# Patient Record
Sex: FEMALE | Race: BLACK OR AFRICAN AMERICAN | NOT HISPANIC OR LATINO | Employment: OTHER | URBAN - METROPOLITAN AREA
[De-identification: names, ages, dates, MRNs, and addresses within clinical notes are randomized per-mention and may not be internally consistent; named-entity substitution may affect disease eponyms.]

---

## 2017-02-03 ENCOUNTER — HOSPITAL ENCOUNTER (OUTPATIENT)
Dept: PHYSICAL MEDICINE AND REHAB | Facility: CLINIC | Age: 62
Discharge: HOME | End: 2017-02-03
Attending: ORTHOPAEDIC SURGERY

## 2017-02-03 DIAGNOSIS — M54.16 LUMBAR RADICULITIS: ICD-10-CM

## 2017-02-14 ENCOUNTER — OFFICE VISIT - HEALTHEAST (OUTPATIENT)
Dept: RADIOLOGY | Facility: CLINIC | Age: 62
End: 2017-02-14

## 2017-03-08 DIAGNOSIS — I10 HYPERTENSION GOAL BP (BLOOD PRESSURE) < 140/90: ICD-10-CM

## 2017-03-08 NOTE — LETTER
March 22, 2017      Aishwarya Way  [address] [place] [address]        Dear Ms. Aishwarya Way,    We are contacting you today to notify you that you are due for a diabetes and blood pressure follow up for further refills. Please call [phone] to schedule an appointment.       Sincerely,     Cassi Wooten MD

## 2017-03-09 NOTE — TELEPHONE ENCOUNTER
hydrochlorothiazide (MICROZIDE) 12.5 MG      Last Written Prescription Date: 3/9/16  Last Fill Quantity: 90, # refills: 2  Last Office Visit with G, P or [facility] prescribing provider: 4/4/16       Potassium   Date Value Ref Range Status   03/04/2016 3.7 3.4 - 5.3 mmol/L Final     Creatinine   Date Value Ref Range Status   11/08/2016 0.73 0.52 - 1.04 mg/dL Final     BP Readings from Last 3 Encounters:   11/08/16 142/78   10/30/16 158/84   05/17/16 (!) 142/94

## 2017-03-10 RX ORDER — HYDROCHLOROTHIAZIDE 12.5 MG/1
12.5 CAPSULE ORAL DAILY
Qty: 30 CAPSULE | Refills: 0 | Status: SHIPPED | OUTPATIENT
Start: 2017-03-10

## 2017-03-10 NOTE — TELEPHONE ENCOUNTER
Medication is being filled for 1 time refill only due to:  due for an appt for diabetes check, BP check.   Please contact patient to schedule this.   Siomara Smart, KIRILL  Triage Nurse

## 2017-04-04 DIAGNOSIS — E11.65 TYPE 2 DIABETES MELLITUS WITH HYPERGLYCEMIA, WITHOUT LONG-TERM CURRENT USE OF INSULIN (H): ICD-10-CM

## 2017-04-04 NOTE — LETTER
April 18, 2017      Jocy [address]  [address] [place] [address]        Dear Ms. Aishwarya Way,    We are contacting you today to notify you that you are due for an annual physical (it has been over a year since we last saw you) or a medication follow up for further refills. Please call [phone] to schedule an appointment.     Sincerely,     Cassi Wooten MD

## 2017-04-04 NOTE — TELEPHONE ENCOUNTER
metFORMIN (GLUCOPHAGE) 500 MG         Last Written Prescription Date: 11/6/16  Last Fill Quantity: 90, # refills: 0  Last Office Visit with [facility], [facility] or [facility] prescribing provider:  4/4/16        BP Readings from Last 3 Encounters:   11/08/16 142/78   10/30/16 158/84   05/17/16 (!) 142/94     Lab Results   Component Value Date    MICROL 16 08/25/2015     Lab Results   Component Value Date    UMALCR 7.79 08/25/2015     Creatinine   Date Value Ref Range Status   11/08/2016 0.73 0.52 - 1.04 mg/dL Final   ]  GFR Estimate   Date Value Ref Range Status   11/08/2016 81 >60 mL/min/1.7m2 Final     Comment:     Non  GFR Calc   03/04/2016 >90  Non  GFR Calc   >60 mL/min/1.7m2 Final   08/25/2015 >90  Non  GFR Calc   >60 mL/min/1.7m2 Final     GFR Estimate If Black   Date Value Ref Range Status   11/08/2016 >90   GFR Calc   >60 mL/min/1.7m2 Final   03/04/2016 >90   GFR Calc   >60 mL/min/1.7m2 Final   08/25/2015 >90   GFR Calc   >60 mL/min/1.7m2 Final     Lab Results   Component Value Date    CHOL 179 03/04/2016     Lab Results   Component Value Date    HDL 71 03/04/2016     Lab Results   Component Value Date    LDL 90 03/04/2016     Lab Results   Component Value Date    TRIG 88 03/04/2016     Lab Results   Component Value Date    CHOLHDLRATIO 2.5 08/25/2015     Lab Results   Component Value Date    AST 45 03/04/2016     Lab Results   Component Value Date    ALT 93 03/04/2016     Lab Results   Component Value Date    A1C 6.5 03/04/2016    A1C 7.3 08/25/2015    A1C 8.0 04/14/2015    A1C 7.6 07/31/2014    A1C 7.9 03/13/2014     Potassium   Date Value Ref Range Status   03/04/2016 3.7 3.4 - 5.3 mmol/L Final

## 2017-04-05 NOTE — TELEPHONE ENCOUNTER
Medication is being filled for 1 time refill only due to:  due for annual labs and office visit.    Difficulty contacting patient-please try again to help her schedule.  Siomara Smart, RN  Triage Nurse

## 2017-04-12 DIAGNOSIS — I10 HYPERTENSION GOAL BP (BLOOD PRESSURE) < 140/90: ICD-10-CM

## 2017-04-13 RX ORDER — HYDROCHLOROTHIAZIDE 12.5 MG/1
CAPSULE ORAL
Qty: 30 CAPSULE | Refills: 0
Start: 2017-04-13

## 2017-04-13 NOTE — TELEPHONE ENCOUNTER
Siobhan refill given 3/10/2017. No appt made after attempts to reach patient. Refusal message sent to pharmacy.    Federica Vale RN

## 2017-04-13 NOTE — TELEPHONE ENCOUNTER
hydrochlorothiazide (MICROZIDE) 12.5 MG      Last Written Prescription Date: 3/10/17  Last Fill Quantity: 30, # refills: 0  Last Office Visit with G, P or [facility] prescribing provider: 4/4/16       Potassium   Date Value Ref Range Status   03/04/2016 3.7 3.4 - 5.3 mmol/L Final     Creatinine   Date Value Ref Range Status   11/08/2016 0.73 0.52 - 1.04 mg/dL Final     BP Readings from Last 3 Encounters:   11/08/16 142/78   10/30/16 158/84   05/17/16 (!) 142/94

## 2017-04-26 DIAGNOSIS — I10 HYPERTENSION GOAL BP (BLOOD PRESSURE) < 140/90: ICD-10-CM

## 2017-04-26 RX ORDER — HYDROCHLOROTHIAZIDE 12.5 MG/1
CAPSULE ORAL
Qty: 30 CAPSULE | Refills: 0 | OUTPATIENT
Start: 2017-04-26

## 2017-04-26 NOTE — TELEPHONE ENCOUNTER
Per last refill request, due for an appointment. Last refill was denied 4/12.  Unable to contact patient.  Siomara Smart, KIRILL  Triage Nurse

## 2017-05-07 DIAGNOSIS — E11.65 TYPE 2 DIABETES MELLITUS WITH HYPERGLYCEMIA, WITHOUT LONG-TERM CURRENT USE OF INSULIN (H): ICD-10-CM

## 2017-05-08 NOTE — TELEPHONE ENCOUNTER
metFORMIN (GLUCOPHAGE) 500 MG         Last Written Prescription Date: 4/5/17  Last Fill Quantity: 90, # refills: 0  Last Office Visit with [facility], [facility] or [facility] prescribing provider:  4/4/16        BP Readings from Last 3 Encounters:   11/08/16 142/78   10/30/16 158/84   05/17/16 (!) 142/94     Lab Results   Component Value Date    MICROL 16 08/25/2015     Lab Results   Component Value Date    UMALCR 7.79 08/25/2015     Creatinine   Date Value Ref Range Status   11/08/2016 0.73 0.52 - 1.04 mg/dL Final   ]  GFR Estimate   Date Value Ref Range Status   11/08/2016 81 >60 mL/min/1.7m2 Final     Comment:     Non  GFR Calc   03/04/2016 >90  Non  GFR Calc   >60 mL/min/1.7m2 Final   08/25/2015 >90  Non  GFR Calc   >60 mL/min/1.7m2 Final     GFR Estimate If Black   Date Value Ref Range Status   11/08/2016 >90   GFR Calc   >60 mL/min/1.7m2 Final   03/04/2016 >90   GFR Calc   >60 mL/min/1.7m2 Final   08/25/2015 >90   GFR Calc   >60 mL/min/1.7m2 Final     Lab Results   Component Value Date    CHOL 179 03/04/2016     Lab Results   Component Value Date    HDL 71 03/04/2016     Lab Results   Component Value Date    LDL 90 03/04/2016     Lab Results   Component Value Date    TRIG 88 03/04/2016     Lab Results   Component Value Date    CHOLHDLRATIO 2.5 08/25/2015     Lab Results   Component Value Date    AST 45 03/04/2016     Lab Results   Component Value Date    ALT 93 03/04/2016     Lab Results   Component Value Date    A1C 6.5 03/04/2016    A1C 7.3 08/25/2015    A1C 8.0 04/14/2015    A1C 7.6 07/31/2014    A1C 7.9 03/13/2014     Potassium   Date Value Ref Range Status   03/04/2016 3.7 3.4 - 5.3 mmol/L Final

## 2017-05-10 DIAGNOSIS — I10 HYPERTENSION GOAL BP (BLOOD PRESSURE) < 140/90: ICD-10-CM

## 2017-05-11 RX ORDER — HYDROCHLOROTHIAZIDE 12.5 MG/1
CAPSULE ORAL
Qty: 30 CAPSULE | Refills: 0
Start: 2017-05-11

## 2017-05-11 NOTE — TELEPHONE ENCOUNTER
hydrochlorothiazide (MICROZIDE) 12.5 MG      Last Written Prescription Date: 3/10/17  Last Fill Quantity: 30, # refills: 0  Last Office Visit with G, P or [facility] prescribing provider: 4/4/16       Potassium   Date Value Ref Range Status   03/04/2016 3.7 3.4 - 5.3 mmol/L Final     Creatinine   Date Value Ref Range Status   11/08/2016 0.73 0.52 - 1.04 mg/dL Final     BP Readings from Last 3 Encounters:   11/08/16 142/78   10/30/16 158/84   05/17/16 (!) 142/94

## 2017-06-10 DIAGNOSIS — E11.65 TYPE 2 DIABETES MELLITUS WITH HYPERGLYCEMIA, WITHOUT LONG-TERM CURRENT USE OF INSULIN (H): ICD-10-CM

## 2017-06-12 NOTE — TELEPHONE ENCOUNTER
metFORMIN (GLUCOPHAGE) 500 MG         Last Written Prescription Date: 4/5/17  Last Fill Quantity: 90, # refills: 0  Last Office Visit with [facility], [facility] or [facility] prescribing provider:  4/4/16        BP Readings from Last 3 Encounters:   11/08/16 142/78   10/30/16 158/84   05/17/16 (!) 142/94     Lab Results   Component Value Date    MICROL 16 08/25/2015     Lab Results   Component Value Date    UMALCR 7.79 08/25/2015     Creatinine   Date Value Ref Range Status   11/08/2016 0.73 0.52 - 1.04 mg/dL Final   ]  GFR Estimate   Date Value Ref Range Status   11/08/2016 81 >60 mL/min/1.7m2 Final     Comment:     Non  GFR Calc   03/04/2016 >90  Non  GFR Calc   >60 mL/min/1.7m2 Final   08/25/2015 >90  Non  GFR Calc   >60 mL/min/1.7m2 Final     GFR Estimate If Black   Date Value Ref Range Status   11/08/2016 >90   GFR Calc   >60 mL/min/1.7m2 Final   03/04/2016 >90   GFR Calc   >60 mL/min/1.7m2 Final   08/25/2015 >90   GFR Calc   >60 mL/min/1.7m2 Final     Lab Results   Component Value Date    CHOL 179 03/04/2016     Lab Results   Component Value Date    HDL 71 03/04/2016     Lab Results   Component Value Date    LDL 90 03/04/2016     Lab Results   Component Value Date    TRIG 88 03/04/2016     Lab Results   Component Value Date    CHOLHDLRATIO 2.5 08/25/2015     Lab Results   Component Value Date    AST 45 03/04/2016     Lab Results   Component Value Date    ALT 93 03/04/2016     Lab Results   Component Value Date    A1C 6.5 03/04/2016    A1C 7.3 08/25/2015    A1C 8.0 04/14/2015    A1C 7.6 07/31/2014    A1C 7.9 03/13/2014     Potassium   Date Value Ref Range Status   03/04/2016 3.7 3.4 - 5.3 mmol/L Final

## 2017-06-14 NOTE — TELEPHONE ENCOUNTER
Several attempts to contact patient to help her schedule an appointment.   Refusal sent to pharmacy.

## 2017-08-12 ENCOUNTER — HOSPITAL ENCOUNTER (EMERGENCY)
Facility: CLINIC | Age: 62
Discharge: HOME | End: 2017-08-12
Attending: EMERGENCY MEDICINE | Admitting: EMERGENCY MEDICINE
Payer: COMMERCIAL

## 2017-08-12 ENCOUNTER — APPOINTMENT (OUTPATIENT)
Dept: GENERAL RADIOLOGY | Facility: CLINIC | Age: 62
End: 2017-08-12
Attending: EMERGENCY MEDICINE
Payer: COMMERCIAL

## 2017-08-12 VITALS
DIASTOLIC BLOOD PRESSURE: 83 MMHG | HEART RATE: 108 BPM | RESPIRATION RATE: 18 BRPM | OXYGEN SATURATION: 98 % | HEIGHT: 66 IN | TEMPERATURE: 97.7 F | BODY MASS INDEX: 30.05 KG/M2 | WEIGHT: 187 LBS | SYSTOLIC BLOOD PRESSURE: 139 MMHG

## 2017-08-12 DIAGNOSIS — M79.641 PAIN OF RIGHT HAND: ICD-10-CM

## 2017-08-12 DIAGNOSIS — L03.113 CELLULITIS OF MULTIPLE SITES OF RIGHT HAND AND FINGERS: ICD-10-CM

## 2017-08-12 DIAGNOSIS — L03.011 CELLULITIS OF MULTIPLE SITES OF RIGHT HAND AND FINGERS: ICD-10-CM

## 2017-08-12 DIAGNOSIS — E11.65 TYPE 2 DIABETES MELLITUS WITH HYPERGLYCEMIA, WITHOUT LONG-TERM CURRENT USE OF INSULIN (H): ICD-10-CM

## 2017-08-12 DIAGNOSIS — M79.89 SWELLING OF RIGHT HAND: ICD-10-CM

## 2017-08-12 LAB
ANION GAP SERPL CALCULATED.3IONS-SCNC: 7 MMOL/L (ref 3–14)
BASOPHILS # BLD AUTO: 0 10E9/L (ref 0–0.2)
BASOPHILS NFR BLD AUTO: 0.3 %
BUN SERPL-MCNC: 14 MG/DL (ref 7–30)
CALCIUM SERPL-MCNC: 9.5 MG/DL (ref 8.5–10.1)
CHLORIDE SERPL-SCNC: 101 MMOL/L (ref 94–109)
CO2 SERPL-SCNC: 30 MMOL/L (ref 20–32)
CREAT SERPL-MCNC: 0.71 MG/DL (ref 0.52–1.04)
CRP SERPL-MCNC: 14.2 MG/L (ref 0–8)
DIFFERENTIAL METHOD BLD: ABNORMAL
EOSINOPHIL # BLD AUTO: 0 10E9/L (ref 0–0.7)
EOSINOPHIL NFR BLD AUTO: 0.2 %
ERYTHROCYTE [DISTWIDTH] IN BLOOD BY AUTOMATED COUNT: 13.8 % (ref 10–15)
GFR SERPL CREATININE-BSD FRML MDRD: 83 ML/MIN/1.7M2
GLUCOSE SERPL-MCNC: 187 MG/DL (ref 70–99)
HCT VFR BLD AUTO: 36 % (ref 35–47)
HGB BLD-MCNC: 11.6 G/DL (ref 11.7–15.7)
IMM GRANULOCYTES # BLD: 0 10E9/L (ref 0–0.4)
IMM GRANULOCYTES NFR BLD: 0.4 %
LYMPHOCYTES # BLD AUTO: 2.2 10E9/L (ref 0.8–5.3)
LYMPHOCYTES NFR BLD AUTO: 24.2 %
MCH RBC QN AUTO: 29.5 PG (ref 26.5–33)
MCHC RBC AUTO-ENTMCNC: 32.2 G/DL (ref 31.5–36.5)
MCV RBC AUTO: 92 FL (ref 78–100)
MONOCYTES # BLD AUTO: 0.6 10E9/L (ref 0–1.3)
MONOCYTES NFR BLD AUTO: 6.7 %
NEUTROPHILS # BLD AUTO: 6.1 10E9/L (ref 1.6–8.3)
NEUTROPHILS NFR BLD AUTO: 68.2 %
NRBC # BLD AUTO: 0 10*3/UL
NRBC BLD AUTO-RTO: 0 /100
PLATELET # BLD AUTO: 337 10E9/L (ref 150–450)
POTASSIUM SERPL-SCNC: 3.6 MMOL/L (ref 3.4–5.3)
RBC # BLD AUTO: 3.93 10E12/L (ref 3.8–5.2)
SODIUM SERPL-SCNC: 138 MMOL/L (ref 133–144)
WBC # BLD AUTO: 9 10E9/L (ref 4–11)

## 2017-08-12 PROCEDURE — 29125 APPL SHORT ARM SPLINT STATIC: CPT | Mod: RT

## 2017-08-12 PROCEDURE — 85025 COMPLETE CBC W/AUTO DIFF WBC: CPT | Performed by: EMERGENCY MEDICINE

## 2017-08-12 PROCEDURE — 73130 X-RAY EXAM OF HAND: CPT | Mod: RT

## 2017-08-12 PROCEDURE — 99284 EMERGENCY DEPT VISIT MOD MDM: CPT | Mod: 25

## 2017-08-12 PROCEDURE — 76882 US LMTD JT/FCL EVL NVASC XTR: CPT

## 2017-08-12 PROCEDURE — 80048 BASIC METABOLIC PNL TOTAL CA: CPT | Performed by: EMERGENCY MEDICINE

## 2017-08-12 PROCEDURE — 86140 C-REACTIVE PROTEIN: CPT | Performed by: EMERGENCY MEDICINE

## 2017-08-12 PROCEDURE — 25000132 ZZH RX MED GY IP 250 OP 250 PS 637: Performed by: EMERGENCY MEDICINE

## 2017-08-12 RX ORDER — CEPHALEXIN 500 MG/1
500 CAPSULE ORAL 3 TIMES DAILY
Qty: 21 CAPSULE | Refills: 0 | Status: SHIPPED | OUTPATIENT
Start: 2017-08-12 | End: 2017-08-19

## 2017-08-12 RX ORDER — INDOMETHACIN 50 MG/1
50 CAPSULE ORAL
Qty: 9 CAPSULE | Refills: 0 | Status: ON HOLD | OUTPATIENT
Start: 2017-08-12 | End: 2024-05-13

## 2017-08-12 RX ORDER — OXYCODONE AND ACETAMINOPHEN 5; 325 MG/1; MG/1
1-2 TABLET ORAL EVERY 4 HOURS PRN
Qty: 15 TABLET | Refills: 0 | Status: ON HOLD | OUTPATIENT
Start: 2017-08-12 | End: 2024-05-13

## 2017-08-12 RX ORDER — OXYCODONE HYDROCHLORIDE 5 MG/1
10 TABLET ORAL ONCE
Status: COMPLETED | OUTPATIENT
Start: 2017-08-12 | End: 2017-08-12

## 2017-08-12 RX ORDER — KETOROLAC TROMETHAMINE 10 MG/1
10 TABLET, FILM COATED ORAL EVERY 6 HOURS PRN
COMMUNITY
End: 2017-08-12

## 2017-08-12 RX ORDER — LIDOCAINE 40 MG/G
CREAM TOPICAL
Status: DISCONTINUED | OUTPATIENT
Start: 2017-08-12 | End: 2017-08-12 | Stop reason: HOSPADM

## 2017-08-12 RX ADMIN — OXYCODONE HYDROCHLORIDE 10 MG: 5 TABLET ORAL at 07:07

## 2017-08-12 ASSESSMENT — ENCOUNTER SYMPTOMS
CHILLS: 0
NAUSEA: 0
FEVER: 0
COLOR CHANGE: 1
VOMITING: 0

## 2017-08-12 NOTE — ED NOTES
Pt to ER with c/o pain and redness to right hand, noted on thurs and saw Dr. ramirez gave her toradol, now increased redness and warm to the touch

## 2017-08-12 NOTE — ED AVS SNAPSHOT
[facility] Emergency Department    [address]    [facility] [address]    Phone:  [phone]    Fax:  [phone]                                       Aishwarya Way   MRN: [id number]    Department:  [facility] Emergency Department   Date of Visit:  8/12/2017           After Visit Summary Signature Page     I have received my discharge instructions, and my questions have been answered. I have discussed any challenges I see with this plan with the nurse or doctor.    ..........................................................................................................................................  Patient/Patient Representative Signature      ..........................................................................................................................................  Patient Representative Print Name and Relationship to Patient    ..................................................               ................................................  Date                                            Time    ..........................................................................................................................................  Reviewed by Signature/Title    ...................................................              ..............................................  Date                                                            Time

## 2017-08-12 NOTE — ED PROVIDER NOTES
History     Chief Complaint:  Hand pain    HPI   Aishwarya Way is a 62 year old female with a history of controlled diabetes,  who presents with hand pain. The patient states that two days ago she went to sleep at 1900 and woke up two hours later with some right hand pain. The pain originated in her ring finger but has spread into her entire right hand now. There was initially no swelling in the hand, however in the past 24 hours the pain has increased and redness, warmness to touch, and swelling have begun. Yesterday the patient went to [facility] for this and was prescribed Toradol and over the counter analgesics for treatment. The patient denies having any history of gout, recent travel, or recurrent infection. She denies wound or injury of the skin overlying the area.  Of note she does state that when she was a child she would have random occasional painless right  hand swelling but that this has not happened to her as an adult. Her pain does not radiate anywhere outside of her hand and she denies experiencing any fever, chills, or nausea.     Allergies:  No Known Drug Allergies      Medications:    Toradol  Metformin  Microzide  Gabapentin  lariam  Crestor  Zyrtec    Past Medical History:    Obesity  Osteoarthritis  Diabetes mellitus  Hyperlipidemia  Hypertension    Past Surgical History:    Colonoscopy  D&C    Family History:    The patient denies any relevant family medical history.     Social History:  Smoking Status: No  Smokeless Tobacco: No  Alcohol Use: No   Marital Status:   [2]    Review of Systems   Constitutional: Negative for chills and fever.   Gastrointestinal: Negative for nausea and vomiting.   Musculoskeletal:        Right hand swelling and pain   Skin: Positive for color change.   All other systems reviewed and are negative.    Physical Exam   Vitals:  Patient Vitals for the past 24 hrs:   BP Temp Temp src Pulse Resp SpO2 Height Weight   08/12/17 0634 (!) 159/91  "97.7  F (36.5  C) Temporal 108 20 98 % 1.676 m (5' 6\") 84.8 kg (187 lb)      Physical Exam   General: Adult female sitting upright in bed  Eyes: PERRL, Conjunctive within normal limits  ENT: Moist mucous membranes, oropharynx clear.   CV: Normal S1S2, no murmur, rub or gallop. Regular rate and rhythm. 2+ radial pulse  Resp: Clear to auscultation bilaterally, no wheezes, rales or rhonchi. Normal respiratory effort.  GI: Abdomen is soft, nontender and nondistended. No palpable masses. No rebound or guarding.  MSK: Normal active range of motion. Tenderness and swelling over the dorsum of the right hand into the ring finger. She has mobility of the ring finger but with pain. Some tenderness to palpation over the area of swelling  Skin: Warm and dry. No rashes or lesions or ecchymoses on visible skin. Spreading erythema over the edema as noted on the mas surface of the proximal right ringer finger and palm.  Neuro: Alert and oriented. Responds appropriately to all questions and commands. No focal findings appreciated. Normal muscle tone. Sensation in tact to light touch over the right hand  Psych: Normal mood and affect. Pleasant.   Emergency Department Course     Imaging:  Radiology findings were communicated with the patient who voiced understanding of the findings.  Hand XR G/E 3 views, right:  IMPRESSION: No acute abnormality. Degenerative changes including some  soft tissue ossification at the base of the fourth proximal phalanx.  Reading per radiology.     Laboratory:  Laboratory findings were communicated with the patient who voiced understanding of the findings.  CBC: 11.6(L), o/w WNL (WBC 9.0, )   CRP inflammation: 14.2(H)  BMP: Glucose: 187(H), o/w WNL (Creatinine 0.71)     Procedures:  Procedure: Limited Bedside US  Name of the study: POC US soft tissue  Performed by: Dr Nic CEE  Indications: Right hand swelling and pain  Body Location: Right hand  Findings: Cobblestoning of tissue overlying area " of soft tissue swelling. Normal visualized bone without visible fluid collection at or near the joint of the 4th MCP. No soft tissue fluid collection.  Interpretation: Findings suspicious for cellulitis.   Archiving of images: Hard copy images were printed for scanning into the patient medical record, and images were also saved digitally to the internal hard drive of the ultrasound machine.        Splint Placement    PLACEMENT: Custom Orthoglass ulnar gutter splint with appropriate padding was applied to the right upper extremity and after placement I checked and adjusted the fit to ensure proper positioning. The patient was more comfortable with the splint in place. Sensation and circulation are intact after splint placement.      Interventions:  0707 Oxycodone 10 mg oral    Emergency Department Course:  Nursing notes and vitals reviewed.  I performed an exam of the patient as documented above.   IV was inserted and blood was drawn for laboratory testing, results above.   The patient was sent for a XR while in the emergency department, results above.      0808 I reassessed the patient. I discussed xray findings. Patient denies any new concerns.     0833 I rechecked with the patient and splinted the patient as per procedure note.    0841 I left a message with [facility] hand referral line regarding the patient's follow up    I discussed the treatment plan with the patient. They expressed understanding of this plan and consented to discharge. They will be discharged home with instructions for care and follow up. In addition, the patient will return to the emergency department if their symptoms persist, worsen, if new symptoms arise or if there is any concern.  All questions were answered.     I personally reviewed the laboratory results with the Patient and answered all related questions prior to discharge.    Impression & Plan      Medical Decision Making:  Aishwarya Way is a 62 year old female  type 2 diabetic with reportedly good blood sugar control  who presents to the emergency department today with right hand swelling and pain for now three days, worsening despite NSAIDs. X ray did not show evidence of fracture and she has no trauma, but there is a soft tissue ossification. I considered was septic arthritis or cellulitis. She has some cobblestoning of the tissue overlying the area on bedside ultrasound with no evidence of fluid collection therefor abscess seems unlikely as does a septic joint. Cannot exclude gouty arthritis and it is still on the differential. Due to concern for cellulitis I will cover with Keflex. The patient is splinted for comfort and to avoid spread of infection if this is the case. There is no evidence to suggest flexor tenosynovitis at this time. I did refer her and call the [facility] orthopedics hand specialist referral line. Patient should follow up in 2-3 days for reassessment. Appropriate pain control including ongoing NSAIDs recommended. Return to the emergency room with fever, systemic symptoms, or uncontrolled pain. All questions were answered prior to discharge.    Diagnosis:    ICD-10-CM    1. Cellulitis of multiple sites of right hand and fingers L03.011     L03.113    2. Type 2 diabetes mellitus with hyperglycemia, without long-term current use of insulin (H) E11.65    3. Pain of right hand M79.641    4. Swelling of right hand M79.89         Disposition:   Discharged    Discharge Medications:  New Prescriptions    CEPHALEXIN (KEFLEX) 500 MG CAPSULE    Take 1 capsule (500 mg) by mouth 3 times daily for 7 days    INDOMETHACIN (INDOCIN) 50 MG CAPSULE    Take 1 capsule (50 mg) by mouth 3 times daily (with meals) for 3 days    OXYCODONE-ACETAMINOPHEN (PERCOCET) 5-325 MG PER TABLET    Take 1-2 tablets by mouth every 4 hours as needed for pain       Scribe Disclosure:  Layo LEUNG, am serving as a scribe at 6:51 AM on 8/12/2017 to document services personally  performed by Ashley Lucero MD, based on my observations and the provider's statements to me.   [facility] EMERGENCY DEPARTMENT       Ashley Lucero MD  08/12/17 6717

## 2017-08-12 NOTE — ED AVS SNAPSHOT
[facility] Emergency Department    [address]    [facility] [address]    Phone:  [phone]    Fax:  [phone]                                       Aishwarya Way   MRN: [id number]    Department:  [facility] Emergency Department   Date of Visit:  8/12/2017           Patient Information     Date Of Birth          1955        Your diagnoses for this visit were:     Cellulitis of multiple sites of right hand and fingers     Type 2 diabetes mellitus with hyperglycemia, without long-term current use of insulin (H)     Pain of right hand     Swelling of right hand        You were seen by Ashley Lucero MD.      Follow-up Information     Follow up with [facility].    Why:  2-3 days for reassessment        Follow up with [facility] Emergency Department.    Specialty:  EMERGENCY MEDICINE    Why:  As needed, If symptoms worsen    Contact information:    [address] [facility] [id number] [phone]        Discharge Instructions       Discharge Instructions  Cellulitis    Cellulitis is an infection of the skin that occurs when bacteria enter the skin.   Symptoms are generally redness, swelling, warmth and pain.  Your infection appeared to be appropriate to treat at home with antibiotics.  However, sometimes your infection may be worse than it seemed at first, or may worsen with time. If you have new or worse symptoms, you may need to be seen again in the Emergency Department or by your primary provider.    Generally, every Emergency Department visit should have a follow-up clinic visit with either a primary or a specialty clinic/provider. Please follow-up as instructed by your emergency provider today.    Return to the Emergency Department if:    The redness, pain, or swelling gets a lot worse.  If the red area was marked, return if it is red significantly beyond the marked area.    You are unable  to get your antibiotics, or are vomiting (throwing up) these pills, or you cannot take them.    You are feeling more ill, weak or lightheaded.    You start to run a new fever (temperature >101 F).    Anything else about the infection worries or concerns you.  Treatment:    Start your antibiotics right away, and take them as prescribed. Be sure to finish the whole prescription, even if you are better.    Apply a heating pad, warm packs, or warm water soaks to the infected area for 15 minutes at a time, at least 3 times a day. Do not use a heating pad on your feet or legs if you have diabetes. Do not sleep with a heating pad on, since this can cause burns or skin injury.    Rest your injured area for at least 1-2 days. After that you may start using your extremity again as long as there is not too much pain.     Raise the injured area above the level of your heart as much as possible in the first 1-2 days.    Tylenol  (acetaminophen), Motrin  (ibuprofen), or Advil  (ibuprofen) may help may help reduce pain and fever and may help you feel more comfortable. Be sure to read and follow the package directions, and ask your provider if you have questions.    If you were given a prescription for medicine here today, be sure to read all of the information (including the package insert) that comes with your prescription.  This will include important information about the medicine, its side effects, and any warnings that you need to know about.  The pharmacist who fills the prescription can provide more information and answer questions you may have about the medicine.  If you have questions or concerns that the pharmacist cannot address, please call or return to the Emergency Department.     Remember that you can always come back to the Emergency Department if you are not able to see your regular provider in the amount of time listed above, if you get any new symptoms, or if there is anything that worries you.    Discharge  References/Attachments     GOUT (ENGLISH)      24 Hour Appointment Hotline       To make an appointment at any [facility], call [phone] ([phone]). If you don't have a family doctor or clinic, we will help you find one. [facility] clinics are conveniently located to serve the needs of you and your family.             Review of your medicines      START taking        Dose / Directions Last dose taken    cephALEXin 500 MG capsule   Commonly known as:  KEFLEX   Dose:  500 mg   Quantity:  21 capsule        Take 1 capsule (500 mg) by mouth 3 times daily for 7 days   Refills:  0        indomethacin 50 MG capsule   Commonly known as:  INDOCIN   Dose:  50 mg   Quantity:  9 capsule        Take 1 capsule (50 mg) by mouth 3 times daily (with meals) for 3 days   Refills:  0        oxyCODONE-acetaminophen 5-325 MG per tablet   Commonly known as:  PERCOCET   Dose:  1-2 tablet   Quantity:  15 tablet        Take 1-2 tablets by mouth every 4 hours as needed for pain   Refills:  0          Our records show that you are taking the medicines listed below. If these are incorrect, please call your family doctor or clinic.        Dose / Directions Last dose taken    aspirin 325 MG tablet   Dose:  1 tablet        Take 1 tablet by mouth every other day   Refills:  0        blood glucose monitoring lancets   Quantity:  3 Box        Use to test blood sugar 1 times daily or as directed.   Refills:  0        blood glucose monitoring meter device kit   Commonly known as:  no brand specified   Dose:  1 kit   Quantity:  1 kit        1 kit by In Vitro route daily   Refills:  0        blood glucose monitoring test strip   Commonly known as:  no brand specified   Quantity:  100 each        Use to test blood sugar 1 times daily or as directed.   Refills:  0        gabapentin 300 MG capsule   Commonly known as:  NEURONTIN   Dose:  300 mg   Quantity:  14 capsule        Take 1 capsule (300 mg) by mouth 2 times daily for 7 days   Refills:   0        hydrochlorothiazide 12.5 MG capsule   Commonly known as:  MICROZIDE   Dose:  12.5 mg   Quantity:  30 capsule        Take 1 capsule (12.5 mg) by mouth daily Last refill the pt needs an appt with her provider   Refills:  0        HYDROcodone-acetaminophen 5-325 MG per tablet   Commonly known as:  NORCO   Dose:  1-2 tablet   Quantity:  15 tablet        Take 1-2 tablets by mouth every 6 hours as needed for moderate to severe pain   Refills:  0        mefloquine 250 MG tablet   Commonly known as:  LARIAM   Dose:  250 mg   Quantity:  9 tablet        Take 1 tablet (250 mg) by mouth every 7 days Start 2 weeks prior to trip  And continue for 4 weeks after returning home.   Refills:  0        metFORMIN 500 MG tablet   Commonly known as:  GLUCOPHAGE   Quantity:  90 tablet        TAKE 2 TABLETS BY MOUTH EVERY MORNING AND TAKE 1 TABLET BY MOUTH EVERY EVENING   Refills:  0        MULTIVITAMIN/IRON PO        Take  by mouth.   Refills:  0        OVER-THE-COUNTER        Allergy eye drops   Refills:  0        rosuvastatin 20 MG tablet   Commonly known as:  CRESTOR   Dose:  20 mg   Quantity:  90 tablet        Take 1 tablet (20 mg) by mouth daily   Refills:  2        ZYRTEC 5 MG/5ML syrup   Dose:  5 mg   Generic drug:  cetirizine        Take 5 mg by mouth daily.   Refills:  0          STOP taking        Dose Reason for stopping Comments    ketorolac 10 MG tablet   Commonly known as:  TORADOL                      Prescriptions were sent or printed at these locations (3 Prescriptions)                   Other Prescriptions                Printed at Department/Unit printer (3 of 3)         cephALEXin (KEFLEX) 500 MG capsule               indomethacin (INDOCIN) 50 MG capsule               oxyCODONE-acetaminophen (PERCOCET) 5-325 MG per tablet                Procedures and tests performed during your visit     Basic metabolic panel    CBC with platelets differential    CRP inflammation    Hand XR, G/E 3 views, right    POC US SOFT  TISSUE    Peripheral IV catheter      Orders Needing Specimen Collection     None      Pending Results     Date and Time Order Name Status Description    8/12/2017 0817 POC US SOFT TISSUE In process             Pending Culture Results     No orders found from 8/10/2017 to 8/13/2017.            Pending Results Instructions     If you had any lab results that were not finalized at the time of your Discharge, you can call the ED Lab Result RN at [phone]. You will be contacted by this team for any positive Lab results or changes in treatment. The nurses are available 7 days a week from 10A to 6:30P.  You can leave a message 24 hours per day and they will return your call.        Test Results From Your Hospital Stay        8/12/2017  7:24 AM      Component Results     Component Value Ref Range & Units Status    WBC 9.0 4.0 - 11.0 10e9/L Final    RBC Count 3.93 3.8 - 5.2 10e12/L Final    Hemoglobin 11.6 (L) 11.7 - 15.7 g/dL Final    Hematocrit 36.0 35.0 - 47.0 % Final    MCV 92 78 - 100 fl Final    MCH 29.5 26.5 - 33.0 pg Final    MCHC 32.2 31.5 - 36.5 g/dL Final    RDW 13.8 10.0 - 15.0 % Final    Platelet Count 337 150 - 450 10e9/L Final    Diff Method Automated Method  Final    % Neutrophils 68.2 % Final    % Lymphocytes 24.2 % Final    % Monocytes 6.7 % Final    % Eosinophils 0.2 % Final    % Basophils 0.3 % Final    % Immature Granulocytes 0.4 % Final    Nucleated RBCs 0 0 /100 Final    Absolute Neutrophil 6.1 1.6 - 8.3 10e9/L Final    Absolute Lymphocytes 2.2 0.8 - 5.3 10e9/L Final    Absolute Monocytes 0.6 0.0 - 1.3 10e9/L Final    Absolute Eosinophils 0.0 0.0 - 0.7 10e9/L Final    Absolute Basophils 0.0 0.0 - 0.2 10e9/L Final    Abs Immature Granulocytes 0.0 0 - 0.4 10e9/L Final    Absolute Nucleated RBC 0.0  Final         8/12/2017  7:37 AM      Component Results     Component Value Ref Range & Units Status    CRP Inflammation 14.2 (H) 0.0 - 8.0 mg/L Final         8/12/2017  7:37 AM      Component Results      Component Value Ref Range & Units Status    Sodium 138 133 - 144 mmol/L Final    Potassium 3.6 3.4 - 5.3 mmol/L Final    Chloride 101 94 - 109 mmol/L Final    Carbon Dioxide 30 20 - 32 mmol/L Final    Anion Gap 7 3 - 14 mmol/L Final    Glucose 187 (H) 70 - 99 mg/dL Final    Urea Nitrogen 14 7 - 30 mg/dL Final    Creatinine 0.71 0.52 - 1.04 mg/dL Final    GFR Estimate 83 >60 mL/min/1.7m2 Final    Non  GFR Calc    GFR Estimate If Black >90   GFR Calc   >60 mL/min/1.7m2 Final    Calcium 9.5 8.5 - 10.1 mg/dL Final         8/12/2017  8:03 AM      Narrative     RIGHT HAND THREE OR MORE VIEWS   8/12/2017 7:49 AM     HISTORY: Pain and swelling.    COMPARISON: None.    FINDINGS: Mild degenerative changes at the right fourth  metacarpophalangeal articulation. There is soft tissue ossification  adjacent to the radial aspect of the base of the fourth proximal  phalanx without adjacent bony destruction. This is likely degenerative  phenomenon. No fracture or acute-appearing abnormality.        Impression     IMPRESSION: No acute abnormality. Degenerative changes including some  soft tissue ossification at the base of the fourth proximal phalanx.    REY GUTIERREZ MD         8/12/2017  8:17 AM      Result not yet available     Exam Begun                Clinical Quality Measure: Blood Pressure Screening     Your blood pressure was checked while you were in the emergency department today. The last reading we obtained was  BP: (!) 159/91 . Please read the guidelines below about what these numbers mean and what you should do about them.  If your systolic blood pressure (the top number) is less than 120 and your diastolic blood pressure (the bottom number) is less than 80, then your blood pressure is normal. There is nothing more that you need to do about it.  If your systolic blood pressure (the top number) is 120-139 or your diastolic blood pressure (the bottom number) is 80-89, your blood pressure  may be higher than it should be. You should have your blood pressure rechecked within a year by a primary care provider.  If your systolic blood pressure (the top number) is 140 or greater or your diastolic blood pressure (the bottom number) is 90 or greater, you may have high blood pressure. High blood pressure is treatable, but if left untreated over time it can put you at risk for heart attack, stroke, or kidney failure. You should have your blood pressure rechecked by a primary care provider within the next 4 weeks.  If your provider in the emergency department today gave you specific instructions to follow-up with your doctor or provider even sooner than that, you should follow that instruction and not wait for up to 4 weeks for your follow-up visit.        Thank you for choosing [facility]       Thank you for choosing [facility] for your care. Our goal is always to provide you with excellent care. Hearing back from our patients is one way we can continue to improve our services. Please take a few minutes to complete the written survey that you may receive in the mail after you visit with us. Thank you!        [employer] Information     [facility] gives you secure access to your electronic health record. If you see a primary care provider, you can also send messages to your care team and make appointments. If you have questions, please call your primary care clinic.  If you do not have a primary care provider, please call [phone] and they will assist you.        Care EveryWhere ID     This is your Care EveryWhere ID. This could be used by other organizations to access your [facility] medical records  [id number]        Equal Access to Services     DINO VAZQUEZ : Hadii rosita Grider, modesta [place], faina wrightalmadalyn guzman. So [facility] [phone].    ATENCIÓN: Si habla español, tiene a [place] disposición servicios gratuitos de asistencia lingüística. Llame al  [phone].    We comply with applicable federal civil rights laws and [place] laws. We do not discriminate on the basis of race, color, national origin, age, disability sex, sexual orientation or gender identity.            After Visit Summary       This is your record. Keep this with you and show to your community pharmacist(s) and doctor(s) at your next visit.

## 2017-08-12 NOTE — DISCHARGE INSTRUCTIONS
Discharge Instructions  Cellulitis    Cellulitis is an infection of the skin that occurs when bacteria enter the skin.   Symptoms are generally redness, swelling, warmth and pain.  Your infection appeared to be appropriate to treat at home with antibiotics.  However, sometimes your infection may be worse than it seemed at first, or may worsen with time. If you have new or worse symptoms, you may need to be seen again in the Emergency Department or by your primary provider.    Generally, every Emergency Department visit should have a follow-up clinic visit with either a primary or a specialty clinic/provider. Please follow-up as instructed by your emergency provider today.    Return to the Emergency Department if:    The redness, pain, or swelling gets a lot worse.  If the red area was marked, return if it is red significantly beyond the marked area.    You are unable to get your antibiotics, or are vomiting (throwing up) these pills, or you cannot take them.    You are feeling more ill, weak or lightheaded.    You start to run a new fever (temperature >101 F).    Anything else about the infection worries or concerns you.  Treatment:    Start your antibiotics right away, and take them as prescribed. Be sure to finish the whole prescription, even if you are better.    Apply a heating pad, warm packs, or warm water soaks to the infected area for 15 minutes at a time, at least 3 times a day. Do not use a heating pad on your feet or legs if you have diabetes. Do not sleep with a heating pad on, since this can cause burns or skin injury.    Rest your injured area for at least 1-2 days. After that you may start using your extremity again as long as there is not too much pain.     Raise the injured area above the level of your heart as much as possible in the first 1-2 days.    Tylenol  (acetaminophen), Motrin  (ibuprofen), or Advil  (ibuprofen) may help may help reduce pain and fever and may help you feel more comfortable.  Be sure to read and follow the package directions, and ask your provider if you have questions.    If you were given a prescription for medicine here today, be sure to read all of the information (including the package insert) that comes with your prescription.  This will include important information about the medicine, its side effects, and any warnings that you need to know about.  The pharmacist who fills the prescription can provide more information and answer questions you may have about the medicine.  If you have questions or concerns that the pharmacist cannot address, please call or return to the Emergency Department.     Remember that you can always come back to the Emergency Department if you are not able to see your regular provider in the amount of time listed above, if you get any new symptoms, or if there is anything that worries you.

## 2017-08-19 ENCOUNTER — NURSE TRIAGE (OUTPATIENT)
Dept: NURSING | Facility: CLINIC | Age: 62
End: 2017-08-19

## 2017-08-20 ENCOUNTER — HOSPITAL ENCOUNTER (EMERGENCY)
Facility: CLINIC | Age: 62
Discharge: HOME | End: 2017-08-20
Attending: EMERGENCY MEDICINE | Admitting: EMERGENCY MEDICINE
Payer: COMMERCIAL

## 2017-08-20 VITALS
SYSTOLIC BLOOD PRESSURE: 132 MMHG | TEMPERATURE: 97.2 F | RESPIRATION RATE: 18 BRPM | OXYGEN SATURATION: 98 % | DIASTOLIC BLOOD PRESSURE: 88 MMHG | BODY MASS INDEX: 30.05 KG/M2 | WEIGHT: 187 LBS | HEART RATE: 62 BPM | HEIGHT: 66 IN

## 2017-08-20 DIAGNOSIS — R73.9 HYPERGLYCEMIA: ICD-10-CM

## 2017-08-20 LAB
ALBUMIN SERPL-MCNC: 3.6 G/DL (ref 3.4–5)
ALP SERPL-CCNC: 83 U/L (ref 40–150)
ALT SERPL W P-5'-P-CCNC: 42 U/L (ref 0–50)
ANION GAP SERPL CALCULATED.3IONS-SCNC: 9 MMOL/L (ref 3–14)
AST SERPL W P-5'-P-CCNC: 26 U/L (ref 0–45)
BASOPHILS # BLD AUTO: 0 10E9/L (ref 0–0.2)
BASOPHILS NFR BLD AUTO: 0.2 %
BILIRUB SERPL-MCNC: 0.6 MG/DL (ref 0.2–1.3)
BUN SERPL-MCNC: 20 MG/DL (ref 7–30)
CALCIUM SERPL-MCNC: 9.4 MG/DL (ref 8.5–10.1)
CHLORIDE SERPL-SCNC: 93 MMOL/L (ref 94–109)
CO2 SERPL-SCNC: 30 MMOL/L (ref 20–32)
CREAT SERPL-MCNC: 0.7 MG/DL (ref 0.52–1.04)
DIFFERENTIAL METHOD BLD: ABNORMAL
EOSINOPHIL # BLD AUTO: 0 10E9/L (ref 0–0.7)
EOSINOPHIL NFR BLD AUTO: 0 %
ERYTHROCYTE [DISTWIDTH] IN BLOOD BY AUTOMATED COUNT: 13.7 % (ref 10–15)
GFR SERPL CREATININE-BSD FRML MDRD: 85 ML/MIN/1.7M2
GLUCOSE BLDC GLUCOMTR-MCNC: 160 MG/DL (ref 70–99)
GLUCOSE BLDC GLUCOMTR-MCNC: 361 MG/DL (ref 70–99)
GLUCOSE BLDC GLUCOMTR-MCNC: 368 MG/DL (ref 70–99)
GLUCOSE BLDC GLUCOMTR-MCNC: 380 MG/DL (ref 70–99)
GLUCOSE SERPL-MCNC: 383 MG/DL (ref 70–99)
HCT VFR BLD AUTO: 35.2 % (ref 35–47)
HGB BLD-MCNC: 11.6 G/DL (ref 11.7–15.7)
IMM GRANULOCYTES # BLD: 0 10E9/L (ref 0–0.4)
IMM GRANULOCYTES NFR BLD: 0.3 %
LACTATE BLD-SCNC: 2.3 MMOL/L (ref 0.7–2.1)
LACTATE SERPL-SCNC: 3 MMOL/L (ref 0.4–2)
LYMPHOCYTES # BLD AUTO: 1.9 10E9/L (ref 0.8–5.3)
LYMPHOCYTES NFR BLD AUTO: 20.6 %
MCH RBC QN AUTO: 30 PG (ref 26.5–33)
MCHC RBC AUTO-ENTMCNC: 33 G/DL (ref 31.5–36.5)
MCV RBC AUTO: 91 FL (ref 78–100)
MONOCYTES # BLD AUTO: 0.3 10E9/L (ref 0–1.3)
MONOCYTES NFR BLD AUTO: 3.2 %
NEUTROPHILS # BLD AUTO: 6.8 10E9/L (ref 1.6–8.3)
NEUTROPHILS NFR BLD AUTO: 75.7 %
NRBC # BLD AUTO: 0 10*3/UL
NRBC BLD AUTO-RTO: 0 /100
PLATELET # BLD AUTO: 349 10E9/L (ref 150–450)
POTASSIUM SERPL-SCNC: 4.1 MMOL/L (ref 3.4–5.3)
PROT SERPL-MCNC: 7.7 G/DL (ref 6.8–8.8)
RBC # BLD AUTO: 3.87 10E12/L (ref 3.8–5.2)
SODIUM SERPL-SCNC: 132 MMOL/L (ref 133–144)
WBC # BLD AUTO: 9 10E9/L (ref 4–11)

## 2017-08-20 PROCEDURE — 96372 THER/PROPH/DIAG INJ SC/IM: CPT

## 2017-08-20 PROCEDURE — 85025 COMPLETE CBC W/AUTO DIFF WBC: CPT | Performed by: EMERGENCY MEDICINE

## 2017-08-20 PROCEDURE — 83605 ASSAY OF LACTIC ACID: CPT | Performed by: EMERGENCY MEDICINE

## 2017-08-20 PROCEDURE — 25000131 ZZH RX MED GY IP 250 OP 636 PS 637: Performed by: EMERGENCY MEDICINE

## 2017-08-20 PROCEDURE — 36415 COLL VENOUS BLD VENIPUNCTURE: CPT | Performed by: EMERGENCY MEDICINE

## 2017-08-20 PROCEDURE — 25000128 H RX IP 250 OP 636: Performed by: EMERGENCY MEDICINE

## 2017-08-20 PROCEDURE — 96360 HYDRATION IV INFUSION INIT: CPT

## 2017-08-20 PROCEDURE — 96361 HYDRATE IV INFUSION ADD-ON: CPT

## 2017-08-20 PROCEDURE — 25000132 ZZH RX MED GY IP 250 OP 250 PS 637: Performed by: EMERGENCY MEDICINE

## 2017-08-20 PROCEDURE — 99284 EMERGENCY DEPT VISIT MOD MDM: CPT | Mod: 25

## 2017-08-20 PROCEDURE — 00000146 ZZHCL STATISTIC GLUCOSE BY METER IP

## 2017-08-20 PROCEDURE — 80053 COMPREHEN METABOLIC PANEL: CPT | Performed by: EMERGENCY MEDICINE

## 2017-08-20 RX ORDER — ACETAMINOPHEN 325 MG/1
650 TABLET ORAL ONCE
Status: COMPLETED | OUTPATIENT
Start: 2017-08-20 | End: 2017-08-20

## 2017-08-20 RX ADMIN — HUMAN INSULIN 8 UNITS: 100 INJECTION, SOLUTION SUBCUTANEOUS at 03:06

## 2017-08-20 RX ADMIN — ACETAMINOPHEN 650 MG: 325 TABLET ORAL at 00:53

## 2017-08-20 RX ADMIN — SODIUM CHLORIDE 2000 ML: 9 INJECTION, SOLUTION INTRAVENOUS at 04:33

## 2017-08-20 ASSESSMENT — ENCOUNTER SYMPTOMS
DIZZINESS: 1
HEADACHES: 1

## 2017-08-20 NOTE — ED AVS SNAPSHOT
[facility] Emergency Department    [address]    [facility] [address]    Phone:  [phone]    Fax:  [phone]                                       Aishwarya Way   MRN: [id number]    Department:  [facility] Emergency Department   Date of Visit:  8/20/2017           After Visit Summary Signature Page     I have received my discharge instructions, and my questions have been answered. I have discussed any challenges I see with this plan with the nurse or doctor.    ..........................................................................................................................................  Patient/Patient Representative Signature      ..........................................................................................................................................  Patient Representative Print Name and Relationship to Patient    ..................................................               ................................................  Date                                            Time    ..........................................................................................................................................  Reviewed by Signature/Title    ...................................................              ..............................................  Date                                                            Time

## 2017-08-20 NOTE — ED NOTES
Patient presents to ED due high blood sugar. Reports checking blood sugar at home and was 461. States taking 1000 mg metformin PTA    Recently prescribed prednisone.   A/o x4

## 2017-08-20 NOTE — ED AVS SNAPSHOT
[facility] Emergency Department    [address]    [facility] [address]    Phone:  [phone]    Fax:  [phone]                                       Aishwarya Way   MRN: [id number]    Department:  [facility] Emergency Department   Date of Visit:  8/20/2017           Patient Information     Date Of Birth          1955        Your diagnoses for this visit were:     Hyperglycemia        You were seen by Jesus Rincon MD.      Follow-up Information     Follow up with Sara Youngblood In 3 days.    Specialty:  Family Practice    Contact information:    [facility]  [address]  [facility] [address]  [phone]          Follow up with [facility] Emergency Department.    Specialty:  EMERGENCY MEDICINE    Why:  As needed, If symptoms worsen    Contact information:    [address]  [facility] [address]  [phone]        Discharge Instructions         High Blood Sugar (Hyperglycemia)     When you have hyperglycemia, drink plenty of water or other sugar-free, caffeine-free liquids.   Too much glucose (sugar) in your blood is called hyperglycemia or high blood sugar. High blood sugar can lead to a dangerous condition called ketoacidosis. In severe cases, it can lead to dehydration and coma.  Possible causes of hyperglycemia    Inadequate treatment plan for diabetes     Being sick    Being under stress    Taking certain medicines, such as steroids    Eating too much food, especially carbohydrates    Being less active than usual    Not taking enough diabetes medicine  Symptoms of hyperglycemia  Hyperglycemia may not cause symptoms. If you do have symptoms, they may include:    Thirst    Frequent need to urinate    Feeling tired    Nausea and vomiting    Itchy, dry skin    Blurry vision    Fast breathing and breath that smells fruity     Weakness    Dizziness    Wounds or skin infections that don t  heal    Unexplained weight loss if hyperglycemia lasts for more than a few days   What you should do  Make sure you do the following:    Check your blood sugar.    Drink plenty of sugar-free, caffeine-free liquids such as water. Don t drink fruit juice.    Check your blood sugar again every 4 hours. If you take insulin or diabetes medicines, follow your sick-day plan for taking medicine. Call your healthcare provider if you are not able to eat.    Check your blood or urine for ketones as directed.    Call your healthcare provider if your blood sugar and ketones do not return to your target range.  Preventing high blood sugar  To help keep your blood sugar from getting too high:    Control stress.    When you're ill, follow your sick-day plan.     Follow your meal plan. Eat only the amount of food on your meal plan    Follow your exercise plan.    Take your insulin or diabetes medicines as directed by your healthcare team. Also test your blood sugar as directed. If the plan is not working for you, discuss it with your healthcare provider.  Other things to do    Carry a medical ID card, a compact USB drive, or wear a medical alert bracelet or necklace. It should say that you have diabetes. It should also say what to do in case you pass out or go into a coma.    Make sure family, friends, and coworkers know the signs of high blood sugar. Tell them what to do if your blood sugar gets very high and you can t help yourself.    Talk to your healthcare team about other things you can do to prevent high blood sugar.   Special note: Drink plenty of sugar-free and caffeine-free liquids when you feel symptoms of hyperglycemia. Call your healthcare provider if you keep having episodes of hyperglycemia.   Date Last Reviewed: 5/1/2016 2000-2017 "[employer]". [address] [place] [address], [place] [address]. All rights reserved. This information is not intended as a substitute for professional medical care. Always follow  your healthcare professional's instructions.          24 Hour Appointment Hotline       To make an appointment at any [facility], call [phone] ([phone]). If you don't have a family doctor or clinic, we will help you find one. [facility] are conveniently located to serve the needs of you and your family.             Review of your medicines      Our records show that you are taking the medicines listed below. If these are incorrect, please call your family doctor or clinic.        Dose / Directions Last dose taken    aspirin 325 MG tablet   Dose:  1 tablet        Take 1 tablet by mouth every other day   Refills:  0        blood glucose monitoring lancets   Quantity:  3 Box        Use to test blood sugar 1 times daily or as directed.   Refills:  0        blood glucose monitoring meter device kit   Commonly known as:  no brand specified   Dose:  1 kit   Quantity:  1 kit        1 kit by In Vitro route daily   Refills:  0        blood glucose monitoring test strip   Commonly known as:  no brand specified   Quantity:  100 each        Use to test blood sugar 1 times daily or as directed.   Refills:  0        gabapentin 300 MG capsule   Commonly known as:  NEURONTIN   Dose:  300 mg   Quantity:  14 capsule        Take 1 capsule (300 mg) by mouth 2 times daily for 7 days   Refills:  0        hydrochlorothiazide 12.5 MG capsule   Commonly known as:  MICROZIDE   Dose:  12.5 mg   Quantity:  30 capsule        Take 1 capsule (12.5 mg) by mouth daily Last refill the pt needs an appt with her provider   Refills:  0        HYDROcodone-acetaminophen 5-325 MG per tablet   Commonly known as:  NORCO   Dose:  1-2 tablet   Quantity:  15 tablet        Take 1-2 tablets by mouth every 6 hours as needed for moderate to severe pain   Refills:  0        indomethacin 50 MG capsule   Commonly known as:  INDOCIN   Dose:  50 mg   Quantity:  9 capsule        Take 1 capsule (50 mg) by mouth 3 times daily (with meals) for 3  days   Refills:  0        mefloquine 250 MG tablet   Commonly known as:  LARIAM   Dose:  250 mg   Quantity:  9 tablet        Take 1 tablet (250 mg) by mouth every 7 days Start 2 weeks prior to trip  And continue for 4 weeks after returning home.   Refills:  0        metFORMIN 500 MG tablet   Commonly known as:  GLUCOPHAGE   Quantity:  90 tablet        TAKE 2 TABLETS BY MOUTH EVERY MORNING AND TAKE 1 TABLET BY MOUTH EVERY EVENING   Refills:  0        MULTIVITAMIN/IRON PO        Take  by mouth.   Refills:  0        OVER-THE-COUNTER        Allergy eye drops   Refills:  0        oxyCODONE-acetaminophen 5-325 MG per tablet   Commonly known as:  PERCOCET   Dose:  1-2 tablet   Quantity:  15 tablet        Take 1-2 tablets by mouth every 4 hours as needed for pain   Refills:  0        PREDNISONE PO        Refills:  0        rosuvastatin 20 MG tablet   Commonly known as:  CRESTOR   Dose:  20 mg   Quantity:  90 tablet        Take 1 tablet (20 mg) by mouth daily   Refills:  2        ZYRTEC 5 MG/5ML syrup   Dose:  5 mg   Generic drug:  cetirizine        Take 5 mg by mouth daily.   Refills:  0          ASK your doctor about these medications        Dose / Directions Last dose taken    cephALEXin 500 MG capsule   Commonly known as:  KEFLEX   Dose:  500 mg   Quantity:  21 capsule   Ask about: Should I take this medication?        Take 1 capsule (500 mg) by mouth 3 times daily for 7 days   Refills:  0                Procedures and tests performed during your visit     Procedure/Test Number of Times Performed    CBC with platelets differential 1    Comprehensive metabolic panel 1    Glucose by meter 4    Glucose monitor nursing POCT 1    Lactic acid 1    Lactic acid whole blood 1      Orders Needing Specimen Collection     None      Pending Results     No orders found from 8/18/2017 to 8/21/2017.            Pending Culture Results     No orders found from 8/18/2017 to 8/21/2017.            Pending Results Instructions     If you had  any lab results that were not finalized at the time of your Discharge, you can call the ED Lab Result RN at [phone]. You will be contacted by this team for any positive Lab results or changes in treatment. The nurses are available 7 days a week from 10A to 6:30P.  You can leave a message 24 hours per day and they will return your call.        Test Results From Your Hospital Stay        8/20/2017  1:48 AM      Component Results     Component Value Ref Range & Units Status    WBC 9.0 4.0 - 11.0 10e9/L Final    RBC Count 3.87 3.8 - 5.2 10e12/L Final    Hemoglobin 11.6 (L) 11.7 - 15.7 g/dL Final    Hematocrit 35.2 35.0 - 47.0 % Final    MCV 91 78 - 100 fl Final    MCH 30.0 26.5 - 33.0 pg Final    MCHC 33.0 31.5 - 36.5 g/dL Final    RDW 13.7 10.0 - 15.0 % Final    Platelet Count 349 150 - 450 10e9/L Final    Diff Method Automated Method  Final    % Neutrophils 75.7 % Final    % Lymphocytes 20.6 % Final    % Monocytes 3.2 % Final    % Eosinophils 0.0 % Final    % Basophils 0.2 % Final    % Immature Granulocytes 0.3 % Final    Nucleated RBCs 0 0 /100 Final    Absolute Neutrophil 6.8 1.6 - 8.3 10e9/L Final    Absolute Lymphocytes 1.9 0.8 - 5.3 10e9/L Final    Absolute Monocytes 0.3 0.0 - 1.3 10e9/L Final    Absolute Eosinophils 0.0 0.0 - 0.7 10e9/L Final    Absolute Basophils 0.0 0.0 - 0.2 10e9/L Final    Abs Immature Granulocytes 0.0 0 - 0.4 10e9/L Final    Absolute Nucleated RBC 0.0  Final         8/20/2017  1:55 AM      Component Results     Component Value Ref Range & Units Status    Sodium 132 (L) 133 - 144 mmol/L Final    Potassium 4.1 3.4 - 5.3 mmol/L Final    Chloride 93 (L) 94 - 109 mmol/L Final    Carbon Dioxide 30 20 - 32 mmol/L Final    Anion Gap 9 3 - 14 mmol/L Final    Glucose 383 (H) 70 - 99 mg/dL Final    Urea Nitrogen 20 7 - 30 mg/dL Final    Creatinine 0.70 0.52 - 1.04 mg/dL Final    GFR Estimate 85 >60 mL/min/1.7m2 Final    Non  GFR Calc    GFR Estimate If Black >90 >60 mL/min/1.7m2  Final     GFR Calc    Calcium 9.4 8.5 - 10.1 mg/dL Final    Bilirubin Total 0.6 0.2 - 1.3 mg/dL Final    Albumin 3.6 3.4 - 5.0 g/dL Final    Protein Total 7.7 6.8 - 8.8 g/dL Final    Alkaline Phosphatase 83 40 - 150 U/L Final    ALT 42 0 - 50 U/L Final    AST 26 0 - 45 U/L Final         8/20/2017  1:38 AM      Component Results     Component Value Ref Range & Units Status    Lactic Acid 3.0 (H) 0.4 - 2.0 mmol/L Final         8/20/2017  1:26 AM      Component Results     Component Value Ref Range & Units Status    Glucose 380 (H) 70 - 99 mg/dL Final         8/20/2017  2:21 AM      Component Results     Component Value Ref Range & Units Status    Glucose 361 (H) 70 - 99 mg/dL Final    Dr/RN Notified         8/20/2017  3:45 AM      Component Results     Component Value Ref Range & Units Status    Glucose 368 (H) 70 - 99 mg/dL Final         8/20/2017  7:01 AM      Component Results     Component Value Ref Range & Units Status    Lactic Acid 2.3 (H) 0.7 - 2.1 mmol/L Final         8/20/2017  7:20 AM      Component Results     Component Value Ref Range & Units Status    Glucose 160 (H) 70 - 99 mg/dL Final                Clinical Quality Measure: Blood Pressure Screening     Your blood pressure was checked while you were in the emergency department today. The last reading we obtained was  BP: 132/88 . Please read the guidelines below about what these numbers mean and what you should do about them.  If your systolic blood pressure (the top number) is less than 120 and your diastolic blood pressure (the bottom number) is less than 80, then your blood pressure is normal. There is nothing more that you need to do about it.  If your systolic blood pressure (the top number) is 120-139 or your diastolic blood pressure (the bottom number) is 80-89, your blood pressure may be higher than it should be. You should have your blood pressure rechecked within a year by a primary care provider.  If your systolic blood  pressure (the top number) is 140 or greater or your diastolic blood pressure (the bottom number) is 90 or greater, you may have high blood pressure. High blood pressure is treatable, but if left untreated over time it can put you at risk for heart attack, stroke, or kidney failure. You should have your blood pressure rechecked by a primary care provider within the next 4 weeks.  If your provider in the emergency department today gave you specific instructions to follow-up with your doctor or provider even sooner than that, you should follow that instruction and not wait for up to 4 weeks for your follow-up visit.        Thank you for choosing [facility]       Thank you for choosing [facility] for your care. Our goal is always to provide you with excellent care. Hearing back from our patients is one way we can continue to improve our services. Please take a few minutes to complete the written survey that you may receive in the mail after you visit with us. Thank you!        [email or web address] Information     [employer] gives you secure access to your electronic health record. If you see a primary care provider, you can also send messages to your care team and make appointments. If you have questions, please call your primary care clinic.  If you do not have a primary care provider, please call [phone] and they will assist you.        Care EveryWhere ID     This is your Care EveryWhere ID. This could be used by other organizations to access your [facility] medical records  [id number]        Equal Access to Services     DINO VAZQUEZ : Hadii rosita Grider, waaxda lurajeshadaha, qaybta kaalmaalejandro jerez, madalyn barfield. So [facility] [phone].    ATENCIÓN: Si habla español, tiene a rowland disposición servicios gratuitos de asistencia lingüística. Llame al [phone].    We comply with applicable federal civil rights laws and [place] laws. We do not discriminate on the basis of race, color, national  origin, age, disability sex, sexual orientation or gender identity.            After Visit Summary       This is your record. Keep this with you and show to your community pharmacist(s) and doctor(s) at your next visit.

## 2017-08-20 NOTE — TELEPHONE ENCOUNTER
Reason for Disposition    Sounds like a life-threatening emergency to the triager    Additional Information    Negative: Unconscious or difficult to awaken    Negative: Acting confused (e.g., disoriented, slurred speech)    Negative: Very weak (e.g., can't stand)    Negative: [1] Vomiting AND [2] signs of dehydration (e.g., very dry mouth, lightheaded, etc.)    Negative: [1] Blood glucose > 240 mg/dl (13 mmol/l) AND [2] rapid breathing    Protocols used: DIABETES - HIGH BLOOD SUGAR-ADULT-AH

## 2017-08-20 NOTE — DISCHARGE INSTRUCTIONS
High Blood Sugar (Hyperglycemia)     When you have hyperglycemia, drink plenty of water or other sugar-free, caffeine-free liquids.   Too much glucose (sugar) in your blood is called hyperglycemia or high blood sugar. High blood sugar can lead to a dangerous condition called ketoacidosis. In severe cases, it can lead to dehydration and coma.  Possible causes of hyperglycemia    Inadequate treatment plan for diabetes     Being sick    Being under stress    Taking certain medicines, such as steroids    Eating too much food, especially carbohydrates    Being less active than usual    Not taking enough diabetes medicine  Symptoms of hyperglycemia  Hyperglycemia may not cause symptoms. If you do have symptoms, they may include:    Thirst    Frequent need to urinate    Feeling tired    Nausea and vomiting    Itchy, dry skin    Blurry vision    Fast breathing and breath that smells fruity     Weakness    Dizziness    Wounds or skin infections that don t heal    Unexplained weight loss if hyperglycemia lasts for more than a few days   What you should do  Make sure you do the following:    Check your blood sugar.    Drink plenty of sugar-free, caffeine-free liquids such as water. Don t drink fruit juice.    Check your blood sugar again every 4 hours. If you take insulin or diabetes medicines, follow your sick-day plan for taking medicine. Call your healthcare provider if you are not able to eat.    Check your blood or urine for ketones as directed.    Call your healthcare provider if your blood sugar and ketones do not return to your target range.  Preventing high blood sugar  To help keep your blood sugar from getting too high:    Control stress.    When you're ill, follow your sick-day plan.     Follow your meal plan. Eat only the amount of food on your meal plan    Follow your exercise plan.    Take your insulin or diabetes medicines as directed by your healthcare team. Also test your blood sugar as directed. If the  plan is not working for you, discuss it with your healthcare provider.  Other things to do    Carry a medical ID card, a compact USB drive, or wear a medical alert bracelet or necklace. It should say that you have diabetes. It should also say what to do in case you pass out or go into a coma.    Make sure family, friends, and coworkers know the signs of high blood sugar. Tell them what to do if your blood sugar gets very high and you can t help yourself.    Talk to your healthcare team about other things you can do to prevent high blood sugar.   Special note: Drink plenty of sugar-free and caffeine-free liquids when you feel symptoms of hyperglycemia. Call your healthcare provider if you keep having episodes of hyperglycemia.   Date Last Reviewed: 5/1/2016 2000-2017 [employer]. [address] [place] [address], [place] [address]. All rights reserved. This information is not intended as a substitute for professional medical care. Always follow your healthcare professional's instructions.

## 2017-08-20 NOTE — ED PROVIDER NOTES
"  History     Chief Complaint:  Hyperglycemia and Headache    HPI   Aishwarya Way is a 62 year old female, with a history of diabetes, hypertension and hyperlipidemia and recently prescribed Prednisone, who presents to the emergency department for evaluation of hyperglycemia and headache. The patient reports that she had woken up from her sleep and was feeling off, thus decided to check her blood sugar, which reported to be in the 480. She decided to take 2-500 mg tablets of Metformin and checked her blood sugar an hour later and her blood sugar was still high at 461 and decided to present to the ED. She currently reports experiencing a headache and some dizziness as well. She called the [facility] where her primary care physician is located and was referred to the ED. To note, the patient has not taken anything for her headache.     Allergies:  No Known Drug Allergies      Medications:    Prednisone  Keflex  Indocin  Percocet  Glucophage  Microzide  Norco  Neurontin  Lariam  Crestor  Aspirin  Zyrtec  Multivitamin/Iron     Past Medical History:    Diabetes mellitus  Hyperlipidemia  Hypertension    Past Surgical History:    Colonoscopy x 3  D & C    Family History:    The patient denies any relevant family medical history.     Social History:  The patient was accompanied to the ED by .  Smoking Status: No  Smokeless Tobacco: No  Alcohol Use: No   Marital Status:   [2]     Review of Systems   Constitutional:        Hyperglycemia   Neurological: Positive for dizziness and headaches.   All other systems reviewed and are negative.    Physical Exam   Vitals:  Patient Vitals for the past 24 hrs:   BP Temp Temp src Pulse Heart Rate Resp SpO2 Height Weight   08/20/17 0645 132/88 - - 62 - 18 98 % - -   08/20/17 0345 (!) 138/98 - - 68 - 18 99 % - -   08/20/17 0026 (!) 145/110 - - - - - - - -   08/20/17 0024 - 97.2  F (36.2  C) Temporal - 66 16 100 % 1.676 m (5' 6\") 84.8 kg (187 lb)    "   Physical Exam  Nursing note and vitals reviewed.  Constitutional: Cooperative.   HENT:   Mouth/Throat: Moist mucous membranes.   Eyes: EOMI, nonicteric sclera  Cardiovascular: Normal rate, regular rhythm, no murmurs, rubs, or gallops  Pulmonary/Chest: Effort normal and breath sounds normal. No respiratory distress. No wheezes. No rales.   Abdominal: Soft. Nontender, nondistended, no guarding or rigidity. BS present.   Musculoskeletal: Normal range of motion.   Neurological: Alert. Moves all extremities spontaneously.   Skin: Skin is warm and dry. No rash noted.   Psychiatric: Normal mood and affect.     Emergency Department Course     Laboratory:  Laboratory findings were communicated with the patient and family who voiced understanding of the findings.  CBC: HGB 11.6 (L) o/w WNL (WBC 9.0, )  CMP: Glucose 383 (H),  (L), Chloride 93 (L) o/w WNL (Creatinine 0.70)  Lactic Acid (Collected 0117): 3.0 (H)   Lactic Acid (Collected 0648): 2.3 (H)  Glucose by Meter (Collected 0117): 380 (H)  Glucose by Meter (Collected 0217): 361 (H)  Glucose by Meter (Collected 0342): 368 (H)  Glucose by Meter (Collected 0715): 160 (H)    Interventions:  0053 Tylenol 650 mg PO  0306 Insulin regular 8 Units Subcutaneous  0433 0.9% NaCl Bolus 2000 mL IV     Emergency Department Course:  Nursing notes and vitals reviewed.  I performed an exam of the patient as documented above.   IV was inserted and blood was drawn for laboratory testing, results above.     I spoke with the patient concerning her prior refusal of an IV and discussed it was necessary due to her lactic acid levels.     I discussed the treatment plan with the patient. They expressed understanding of this plan and consented to discharge. They will be discharged home with instructions for care and follow up. In addition, the patient will return to the emergency department if their symptoms persist, worsen, if new symptoms arise or if there is any concern.  All  questions were answered.     I personally reviewed the laboratory results with the Patient and spouse and answered all related questions prior to discharge.    Impression & Plan      Medical Decision Making:  Aishwarya Way is a 62 year old female who presents with chief complaint of hyperglycemia. Patient has a history of type II diabetes and is on metformin. She found out that her blood sugar was elevated this evening after having some atypical symptoms for her, including a headache and some dizziness. Patient's lactic acid level was notably elevated to 3. She did not have any evidence of any renal insufficiency. There is no anion gap. I suspect that her hyperglycemia is secondary to her current prednisone use. Patient initially declined an IV after two misses, however given her elevated lactic, I did have a repeat discussion with the patient strongly encouraging that she get IV fluids. After two liters of fluid, her lactic has come down to 2.3 and her blood sugar now is down to 160. Given the drop in her blood glucose in addition to the drop in her lactic acid, I believe she is stable for outpatient follow up. No signs of infection at this time. She is in stable condition at the time of discharge, indications for return to the ED were discussed as well as follow up. All questions were answered and she is in agreement with the plan.     Diagnosis:    ICD-10-CM    1. Hyperglycemia R73.9         Disposition:   Discharged.    Scribe Disclosure:  I, Norma Maldonado, am serving as a scribe at 12:43 AM on 8/20/2017 to document services personally performed by Jesus Rincon MD, based on my observations and the provider's statements to me. 8/20/2017   [facility] EMERGENCY DEPARTMENT       Jesus Rincon MD  08/21/17 5167

## 2017-08-20 NOTE — ED NOTES
The last two times staff has gone in room to see patient she has been sleeping. Continued to encourage patient to drink more fluids. SO at bedside. Gluc=368 at this time. Instructed patient to not eat at this time since she had stephanie crackers earlier.

## 2017-12-01 NOTE — TELEPHONE ENCOUNTER
Requested Prescriptions   Pending Prescriptions Disp Refills     minocycline (MINOCIN/DYNACIN) 50 MG capsule [Pharmacy Med Name: MINOCYCLINE 50MG CAPSULES] 60 capsule 0     Sig: TAKE 1 CAPSULE BY MOUTH TWICE DAILY    Oral Acne/Rosacea Medications Protocol Failed    11/30/2017 10:14 AM       Failed - Recent or future visit with authorizing provider's specialty    Patient had office visit in the last year or has a visit in the next 30 days with authorizing provider.  See chart review.              Failed - Confirmation of diagnosis is required    Please confirm diagnosis is acne or rosacea.     If NOT acne or rosacea; refer request to provider for further evaluation.    If diagnosis IS acne or rosacea, OK to refill BASED ON PREVIOUS REFILL CLINICAL NOTE RECOMMENDATION.         Passed - Patient is 12 years of age or older       Passed - No active pregnancy on record       Passed - No positive prenancy test is past 12 months        Pt has not be seen since 4/4/16 with PCP. Called to schedule OV. No answer. Left message. Will leave medication pended until Pt schedules OV.     Marbella Remy RN -- [facility]

## 2017-12-05 RX ORDER — MINOCYCLINE HYDROCHLORIDE 50 MG/1
CAPSULE ORAL
Qty: 60 CAPSULE | Refills: 0 | OUTPATIENT
Start: 2017-12-05

## 2017-12-06 NOTE — TELEPHONE ENCOUNTER
Unsure if we are even patient's primary clinic any longer. Informed pharmacy, patient needs office visit for refill.

## 2018-04-18 ENCOUNTER — APPOINTMENT (OUTPATIENT)
Dept: CT IMAGING | Facility: CLINIC | Age: 63
End: 2018-04-18
Attending: EMERGENCY MEDICINE
Payer: OTHER MISCELLANEOUS

## 2018-04-18 ENCOUNTER — APPOINTMENT (OUTPATIENT)
Dept: GENERAL RADIOLOGY | Facility: CLINIC | Age: 63
End: 2018-04-18
Attending: EMERGENCY MEDICINE
Payer: OTHER MISCELLANEOUS

## 2018-04-18 ENCOUNTER — HOSPITAL ENCOUNTER (EMERGENCY)
Facility: CLINIC | Age: 63
Discharge: HOME | End: 2018-04-18
Attending: EMERGENCY MEDICINE | Admitting: EMERGENCY MEDICINE
Payer: OTHER MISCELLANEOUS

## 2018-04-18 VITALS
TEMPERATURE: 98.8 F | DIASTOLIC BLOOD PRESSURE: 79 MMHG | HEIGHT: 66 IN | SYSTOLIC BLOOD PRESSURE: 156 MMHG | HEART RATE: 71 BPM | OXYGEN SATURATION: 100 % | RESPIRATION RATE: 18 BRPM
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
## 2018-04-18 DIAGNOSIS — S09.90XA CLOSED HEAD INJURY, INITIAL ENCOUNTER: ICD-10-CM

## 2018-04-18 LAB
ALBUMIN SERPL-MCNC: 4.2 G/DL (ref 3.4–5)
ALP SERPL-CCNC: 69 U/L (ref 40–150)
ALT SERPL W P-5'-P-CCNC: 40 U/L (ref 0–50)
ANION GAP SERPL CALCULATED.3IONS-SCNC: 9 MMOL/L (ref 3–14)
AST SERPL W P-5'-P-CCNC: 26 U/L (ref 0–45)
BASOPHILS # BLD AUTO: 0 10E9/L (ref 0–0.2)
BASOPHILS NFR BLD AUTO: 0.4 %
BILIRUB SERPL-MCNC: 0.6 MG/DL (ref 0.2–1.3)
BUN SERPL-MCNC: 13 MG/DL (ref 7–30)
CALCIUM SERPL-MCNC: 9.5 MG/DL (ref 8.5–10.1)
CHLORIDE SERPL-SCNC: 106 MMOL/L (ref 94–109)
CO2 SERPL-SCNC: 27 MMOL/L (ref 20–32)
CREAT SERPL-MCNC: 0.69 MG/DL (ref 0.52–1.04)
DIFFERENTIAL METHOD BLD: NORMAL
EOSINOPHIL # BLD AUTO: 0 10E9/L (ref 0–0.7)
EOSINOPHIL NFR BLD AUTO: 0.4 %
ERYTHROCYTE [DISTWIDTH] IN BLOOD BY AUTOMATED COUNT: 14.3 % (ref 10–15)
GFR SERPL CREATININE-BSD FRML MDRD: 86 ML/MIN/1.7M2
GLUCOSE SERPL-MCNC: 129 MG/DL (ref 70–99)
HCT VFR BLD AUTO: 38.1 % (ref 35–47)
HGB BLD-MCNC: 12.2 G/DL (ref 11.7–15.7)
IMM GRANULOCYTES # BLD: 0 10E9/L (ref 0–0.4)
IMM GRANULOCYTES NFR BLD: 0.1 %
LYMPHOCYTES # BLD AUTO: 3.7 10E9/L (ref 0.8–5.3)
LYMPHOCYTES NFR BLD AUTO: 51.2 %
MCH RBC QN AUTO: 29.6 PG (ref 26.5–33)
MCHC RBC AUTO-ENTMCNC: 32 G/DL (ref 31.5–36.5)
MCV RBC AUTO: 93 FL (ref 78–100)
MONOCYTES # BLD AUTO: 0.3 10E9/L (ref 0–1.3)
MONOCYTES NFR BLD AUTO: 4.4 %
NEUTROPHILS # BLD AUTO: 3.2 10E9/L (ref 1.6–8.3)
NEUTROPHILS NFR BLD AUTO: 43.5 %
NRBC # BLD AUTO: 0 10*3/UL
NRBC BLD AUTO-RTO: 0 /100
PLATELET # BLD AUTO: 296 10E9/L (ref 150–450)
POTASSIUM SERPL-SCNC: 4 MMOL/L (ref 3.4–5.3)
PROT SERPL-MCNC: 8.1 G/DL (ref 6.8–8.8)
RBC # BLD AUTO: 4.12 10E12/L (ref 3.8–5.2)
SODIUM SERPL-SCNC: 142 MMOL/L (ref 133–144)
WBC # BLD AUTO: 7.3 10E9/L (ref 4–11)

## 2018-04-18 PROCEDURE — 96374 THER/PROPH/DIAG INJ IV PUSH: CPT | Performed by: EMERGENCY MEDICINE

## 2018-04-18 PROCEDURE — 80053 COMPREHEN METABOLIC PANEL: CPT | Performed by: EMERGENCY MEDICINE

## 2018-04-18 PROCEDURE — 40000141 ZZH STATISTIC PERIPHERAL IV START W/O US GUIDANCE

## 2018-04-18 PROCEDURE — 70450 CT HEAD/BRAIN W/O DYE: CPT

## 2018-04-18 PROCEDURE — 72125 CT NECK SPINE W/O DYE: CPT

## 2018-04-18 PROCEDURE — 25000128 H RX IP 250 OP 636: Performed by: EMERGENCY MEDICINE

## 2018-04-18 PROCEDURE — 96361 HYDRATE IV INFUSION ADD-ON: CPT | Performed by: EMERGENCY MEDICINE

## 2018-04-18 PROCEDURE — 71046 X-RAY EXAM CHEST 2 VIEWS: CPT

## 2018-04-18 PROCEDURE — 85025 COMPLETE CBC W/AUTO DIFF WBC: CPT | Performed by: EMERGENCY MEDICINE

## 2018-04-18 PROCEDURE — 99284 EMERGENCY DEPT VISIT MOD MDM: CPT | Mod: Z6 | Performed by: EMERGENCY MEDICINE

## 2018-04-18 PROCEDURE — 99285 EMERGENCY DEPT VISIT HI MDM: CPT | Mod: 25 | Performed by: EMERGENCY MEDICINE

## 2018-04-18 PROCEDURE — 25000132 ZZH RX MED GY IP 250 OP 250 PS 637: Performed by: EMERGENCY MEDICINE

## 2018-04-18 RX ORDER — FENTANYL CITRATE 50 UG/ML
25 INJECTION, SOLUTION INTRAMUSCULAR; INTRAVENOUS ONCE
Status: DISCONTINUED | OUTPATIENT
Start: 2018-04-18 | End: 2018-04-19 | Stop reason: HOSPADM

## 2018-04-18 RX ORDER — KETOROLAC TROMETHAMINE 30 MG/ML
15 INJECTION, SOLUTION INTRAMUSCULAR; INTRAVENOUS ONCE
Status: COMPLETED | OUTPATIENT
Start: 2018-04-18 | End: 2018-04-18

## 2018-04-18 RX ORDER — ACETAMINOPHEN 325 MG/1
975 TABLET ORAL ONCE
Status: COMPLETED | OUTPATIENT
Start: 2018-04-18 | End: 2018-04-18

## 2018-04-18 RX ADMIN — KETOROLAC TROMETHAMINE 15 MG: 30 INJECTION, SOLUTION INTRAMUSCULAR at 22:02

## 2018-04-18 RX ADMIN — SODIUM CHLORIDE 1000 ML: 9 INJECTION, SOLUTION INTRAVENOUS at 20:43

## 2018-04-18 RX ADMIN — ACETAMINOPHEN 975 MG: 325 TABLET, FILM COATED ORAL at 20:37

## 2018-04-18 ASSESSMENT — ENCOUNTER SYMPTOMS
SPEECH DIFFICULTY: 0
CHEST TIGHTNESS: 0
HEADACHES: 1
PALPITATIONS: 0
VOMITING: 0
FATIGUE: 0
NUMBNESS: 0
NAUSEA: 0
SEIZURES: 0
FEVER: 0
CHILLS: 0
ARTHRALGIAS: 0
ABDOMINAL PAIN: 0
WEAKNESS: 0
BACK PAIN: 0
SHORTNESS OF BREATH: 0

## 2018-04-18 NOTE — ED AVS SNAPSHOT
[facility], [place], Emergency Department    [address] [place] [id number]    Phone:  [phone]                                       [address]   MRN: [id number]    Department:  [facility], Emergency Department   Date of Visit:  4/18/2018           After Visit Summary Signature Page     I have received my discharge instructions, and my questions have been answered. I have discussed any challenges I see with this plan with the nurse or doctor.    ..........................................................................................................................................  Patient/Patient Representative Signature      ..........................................................................................................................................  Patient Representative Print Name and Relationship to Patient    ..................................................               ................................................  Date                                            Time    ..........................................................................................................................................  Reviewed by Signature/Title    ...................................................              ..............................................  Date                                                            Time

## 2018-04-18 NOTE — ED AVS SNAPSHOT
[facility], Emergency Department    [address] [address]    Phone:  [phone]                                       [address]   MRN: [id number]    Department:  [facility], Emergency Department   Date of Visit:  4/18/2018           Patient Information     Date Of Birth          1955        Your diagnoses for this visit were:     Fall, initial encounter     Closed head injury, initial encounter        You were seen by Ghassan Mosher MD.      Follow-up Information     Follow up with Sara Youngblood In 3 days.    Specialty:  Family Practice    Why:  As needed    Contact information:    [facility]  [address]  [facility] [address]  [phone]          Follow up with [facility], Emergency Department.    Specialty:  EMERGENCY MEDICINE    Why:  As needed, If symptoms worsen    Contact information:    [address] [place] [id number] [phone]    Additional information:    The [facility] is located on the corner of [facility] and [facility] on the [facility]. It is easily accessible from virtually any point in the [facility] area, via [employer] and [employer].        Discharge Instructions         First Aid: Head Injuries  A strong blow to the head may cause swelling and bleeding inside the skull. The resulting pressure can injure the brain (concussion). If you have any doubts about a concussion, have a healthcare provider check the victim.  When to call 911  Call 911 right away if any of the following is true:    The victim loses consciousness or is lethargic.    The victim has convulsions or seizures.    The victim has unequal pupil size. The pupil is the black part in the center of the eye.    The victim shows any of the following signs of concussion:  ? Confusion or inability to follow normal conversation  ? Slurred speech  ? Dizziness or vision problems  ? Nausea or vomiting  ? Muscle  weakness or loss of mobility  ? Memory loss  ? Sensitivity to noise    A depressed or spongy area in the skull, or visible bone fragments    Clear fluid draining out of  the ears or nose    Bruising behind the ears or around the eyes  While you wait for help:    Reassure the person.    Treat for shock by maintaining body temperature and keeping the victim calm.    Do rescue breathing or CPR, if needed.  If the person has neck or back pain or is unconscious, he or she might have a spine fracture. Move the person only with great caution and only if absolutely needed.   Step 1. Control bleeding    Apply direct pressure to control bleeding. Wear gloves or use other protection to avoid contact with victim's blood.    Wash a minor surface injury with soap and water after the bleeding stops or is reduced.    Cover the wound with a clean dressing and bandage.  Step 2. Ice bumps and bruises    Place a cold pack or ice on the injury to reduce swelling and pain. Placing a cloth between the skin and the ice pack helps prevent tissue damage from severe cold.  Step 3. Observe the victim    Watch for vomiting or changes in mood or alertness. If you notice changes, call for medical help. Signs of concussion may not appear for up to 48 hours.    Tell the person's partner, parent, or roommate about the injury so he or she can continue to observe the victim.  Stitches  If a cut is deep or continues to bleed, or the edges of skin don't stay together evenly, the wound may need to be closed with stitches, tape, staples, or medical glue. Any of these can help speed healing and reduce the risk for infection and the size of the scar. These may be especially important concerns with large wounds, and wounds on the head or other visible body parts.  If you think a wound may need medical care, see a healthcare provider as soon as possible. If you need stitches, they must be done in the first few hours. A wound that is not properly closed is at  risk for serious infection.  Date Last Reviewed: 12/1/2017 2000-2017 [email or web address]. [address] [place] [address], [place] [address]. All rights reserved. This information is not intended as a substitute for professional medical care. Always follow your healthcare professional's instructions.          24 Hour Appointment Hotline       To make an appointment at any [facility], call [phone] ([phone]). If you don't have a family doctor or clinic, we will help you find one. [facility] clinics are conveniently located to serve the needs of you and your family.             Review of your medicines      Our records show that you are taking the medicines listed below. If these are incorrect, please call your family doctor or clinic.        Dose / Directions Last dose taken    aspirin 325 MG tablet   Dose:  1 tablet        Take 1 tablet by mouth every other day   Refills:  0        blood glucose monitoring lancets   Quantity:  3 Box        Use to test blood sugar 1 times daily or as directed.   Refills:  0        blood glucose monitoring meter device kit   Commonly known as:  no brand specified   Dose:  1 kit   Quantity:  1 kit        1 kit by [employer] route daily   Refills:  0        blood glucose monitoring test strip   Commonly known as:  no brand specified   Quantity:  100 each        Use to test blood sugar 1 times daily or as directed.   Refills:  0        gabapentin 300 MG capsule   Commonly known as:  NEURONTIN   Dose:  300 mg   Quantity:  14 capsule        Take 1 capsule (300 mg) by mouth 2 times daily for 7 days   Refills:  0        hydrochlorothiazide 12.5 MG capsule   Commonly known as:  MICROZIDE   Dose:  12.5 mg   Quantity:  30 capsule        Take 1 capsule (12.5 mg) by mouth daily Last refill the pt needs an appt with her provider   Refills:  0        HYDROcodone-acetaminophen 5-325 MG per tablet   Commonly known as:  NORCO   Dose:  1-2 tablet   Quantity:  15 tablet        Take 1-2  tablets by mouth every 6 hours as needed for moderate to severe pain   Refills:  0        indomethacin 50 MG capsule   Commonly known as:  INDOCIN   Dose:  50 mg   Quantity:  9 capsule        Take 1 capsule (50 mg) by mouth 3 times daily (with meals) for 3 days   Refills:  0        mefloquine 250 MG tablet   Commonly known as:  LARIAM   Dose:  250 mg   Quantity:  9 tablet        Take 1 tablet (250 mg) by mouth every 7 days Start 2 weeks prior to trip  And continue for 4 weeks after returning home.   Refills:  0        metFORMIN 500 MG tablet   Commonly known as:  GLUCOPHAGE   Quantity:  90 tablet        TAKE 2 TABLETS BY MOUTH EVERY MORNING AND TAKE 1 TABLET BY MOUTH EVERY EVENING   Refills:  0        MULTIVITAMIN/IRON PO        Take  by mouth.   Refills:  0        OVER-THE-COUNTER        Allergy eye drops   Refills:  0        oxyCODONE-acetaminophen 5-325 MG per tablet   Commonly known as:  PERCOCET   Dose:  1-2 tablet   Quantity:  15 tablet        Take 1-2 tablets by mouth every 4 hours as needed for pain   Refills:  0        PREDNISONE PO        Refills:  0        rosuvastatin 20 MG tablet   Commonly known as:  CRESTOR   Dose:  20 mg   Quantity:  90 tablet        Take 1 tablet (20 mg) by mouth daily   Refills:  2        ZYRTEC 5 MG/5ML syrup   Dose:  5 mg   Generic drug:  cetirizine        Take 5 mg by mouth daily.   Refills:  0                Procedures and tests performed during your visit     CBC with platelets differential    Cervical spine CT w/o contrast    Comprehensive metabolic panel    Head CT w/o contrast    Vascular Access Care Adult IP Consult    XR Chest 2 Views      Orders Needing Specimen Collection     None      Pending Results     Date and Time Order Name Status Description    4/18/2018 1943 XR Chest 2 Views Preliminary     4/18/2018 1943 Cervical spine CT w/o contrast Preliminary     4/18/2018 1943 Head CT w/o contrast Preliminary             Pending Culture Results     No orders found from  4/16/2018 to 4/19/2018.            Pending Results Instructions     If you had any lab results that were not finalized at the time of your Discharge, you can call the ED Lab Result RN at [phone]. You will be contacted by this team for any positive Lab results or changes in treatment. The nurses are available 7 days a week from 10A to 6:30P.  You can leave a message 24 hours per day and they will return your call.        Thank you for choosing [facility]       Thank you for choosing [facility] for your care. Our goal is always to provide you with excellent care. Hearing back from our patients is one way we can continue to improve our services. Please take a few minutes to complete the written survey that you may receive in the mail after you visit with us. Thank you!        [email or web address] Information     [employer] gives you secure access to your electronic health record. If you see a primary care provider, you can also send messages to your care team and make appointments. If you have questions, please call your primary care clinic.  If you do not have a primary care provider, please call [phone] and they will assist you.        Care EveryWhere ID     This is your Care EveryWhere ID. This could be used by other organizations to access your [facility] medical records  [id number]        Equal Access to Services     DINO VAZQUEZ : Bharti Grider, modesta greenwood, faina jerez, madalyn barfield. So [facility] [phone].    ATENCIÓN: Si habla español, tiene a rowland disposición servicios gratuitos de asistencia lingüística. Llame al [phone].    We comply with applicable federal civil rights laws and [place] laws. We do not discriminate on the basis of race, color, national origin, age, disability, sex, sexual orientation, or gender identity.            After Visit Summary       This is your record. Keep this with you and show to your community pharmacist(s) and doctor(s)  at your next visit.

## 2018-04-18 NOTE — LETTER
[facility], [place], EMERGENCY DEPARTMENT  [address] [place] [address]  [phone]      2018    [address]  [address] [place] [address] [address] [place] [address] [phone] (home)     : 1955      To Whom it may concern:    Aishwarya Way was seen in our Emergency Department today, 2018.  Please excuse her through 18.  She may return to work following this period or earlier should symptoms resolve.    Sincerely,    Ghassan Mosher MD

## 2018-04-19 NOTE — ED NOTES
Bed: ED04  Expected date:   Expected time:   Means of arrival:   Comments:  H 419  62 F   Fall, facial injuries, possible concussion  Triaged yellow

## 2018-04-19 NOTE — ED PROVIDER NOTES
"  History     Chief Complaint   Patient presents with     Fall     HPI        62-year-old female with history of obesity, osteoarthritis, type 2 diabetes, hyperlipidemia who arrives today to the emergency department after a left facial injury after sustaining a ground-level fall.  The patient reports a current headache rated at mild.  She reports no nausea vomiting.  She denies loss of consciousness.  She reports no current cervical spine pain but did have pain immediately after fall in the upper cervical region.  She reports no change in sensation or motor ability to her extremities.  No history of current use of blood pressure medications.  The patient reports no other injury to the chest.  She reports no preceding chest pain, palpitations, headache.  The patient reports no recent medical illness such as fever, chills, nausea, vomiting.  Patient denies development of hip pain, or injury to the extremities.    I have reviewed the Medications, Allergies, Past Medical and Surgical History, and Social History in the Epic system.    Review of Systems   Constitutional: Negative for chills, fatigue and fever.   Respiratory: Negative for chest tightness and shortness of breath.    Cardiovascular: Negative for chest pain and palpitations.   Gastrointestinal: Negative for abdominal pain, nausea and vomiting.   Musculoskeletal: Negative for arthralgias and back pain.   Neurological: Positive for headaches. Negative for seizures, speech difficulty, weakness and numbness.   All other systems reviewed and are negative.      Physical Exam   BP: 154/77  Pulse: 71  Temp: 98.8  F (37.1  C)  Resp: 18  Height: 167.6 cm (5' 6\")  SpO2: 100 %      Physical Exam   Constitutional: She is oriented to person, place, and time. She appears well-developed.   HENT:   Head: Normocephalic.       Mouth/Throat: Oropharynx is clear and moist.   Eyes: Pupils are equal, round, and reactive to light.   Neck: Normal range of motion. No spinous process " tenderness present. No rigidity.   Cardiovascular: Normal rate, regular rhythm and normal heart sounds.    Pulmonary/Chest: Effort normal. No respiratory distress. She has no wheezes. She has no rales.   Abdominal: Soft. She exhibits no distension. There is no tenderness. There is no rebound.   Neurological: She is alert and oriented to person, place, and time. No cranial nerve deficit.   Skin: Skin is warm. No rash noted. She is not diaphoretic.   Psychiatric: She has a normal mood and affect.       ED Course     ED Course     Procedures         Labs Ordered and Resulted from Time of ED Arrival Up to the Time of Departure from the ED - No data to display         Assessments & Plan (with Medical Decision Making)     62-year-old female arriving today to the emergency department after ground-level fall while slipping on the ice.  On evaluation she is noted to be alert, afebrile, and hemodynamically stable with mild hypertension.  Evaluation of the head reveals an abrasion to the region of the left maxilla.  There is no underlying crepitus or bony discomfort.  No other evidence of injury to the head.  She is no pain with palpation of the cervical spine.  There is no hyphema or evidence of loose teeth.  No intraoral lesion.  Based on age and head trauma we did obtain head CT and cervical spine imaging which demonstrates no sign of acute intracranial pathology.  Chest x-ray was obtained demonstrating no sign of aortic widening, pulmonary contusion, effusion.  If very low concern for spinous fracture warranting CT imaging.  Bedside fast examination negative with no abdominal pain upon palpation.  We did obtain screening laboratory studies demonstrate no significant anemia, electrolytic disturbance, or leukocytosis.  Evaluate in the extremities reveals no pain with palpation along bone no limitation with range of motion I do not believe she requires further imaging at this time.  In the emergency department she received a  gentle fluid bolus and small dose of pain medication with improvement of symptoms.  This patient was dismissed to home with her family members for close observation.    I have reviewed the nursing notes.    I have reviewed the findings, diagnosis, plan and need for follow up with the patient.    New Prescriptions    No medications on file       Final diagnoses:   Fall, initial encounter   Closed head injury, initial encounter       4/18/2018   [facility], [place], EMERGENCY DEPARTMENT     Ghassan Mosher MD  04/18/18 1680

## 2018-04-19 NOTE — ED TRIAGE NOTES
Pt BIBA after slip on ice. Pt fell forward and hit L side of face and body. No LOC. Afterwards felt light-headed, dizzy, had difficulty concentrating. Feeling soreness all over body. No N/V. Not on blood thinners. Diabetic, .

## 2018-04-19 NOTE — DISCHARGE INSTRUCTIONS
First Aid: Head Injuries  A strong blow to the head may cause swelling and bleeding inside the skull. The resulting pressure can injure the brain (concussion). If you have any doubts about a concussion, have a healthcare provider check the victim.  When to call 911  Call 911 right away if any of the following is true:    The victim loses consciousness or is lethargic.    The victim has convulsions or seizures.    The victim has unequal pupil size. The pupil is the black part in the center of the eye.    The victim shows any of the following signs of concussion:  ? Confusion or inability to follow normal conversation  ? Slurred speech  ? Dizziness or vision problems  ? Nausea or vomiting  ? Muscle weakness or loss of mobility  ? Memory loss  ? Sensitivity to noise    A depressed or spongy area in the skull, or visible bone fragments    Clear fluid draining out of  the ears or nose    Bruising behind the ears or around the eyes  While you wait for help:    Reassure the person.    Treat for shock by maintaining body temperature and keeping the victim calm.    Do rescue breathing or CPR, if needed.  If the person has neck or back pain or is unconscious, he or she might have a spine fracture. Move the person only with great caution and only if absolutely needed.   Step 1. Control bleeding    Apply direct pressure to control bleeding. Wear gloves or use other protection to avoid contact with victim's blood.    Wash a minor surface injury with soap and water after the bleeding stops or is reduced.    Cover the wound with a clean dressing and bandage.  Step 2. Ice bumps and bruises    Place a cold pack or ice on the injury to reduce swelling and pain. Placing a cloth between the skin and the ice pack helps prevent tissue damage from severe cold.  Step 3. Observe the victim    Watch for vomiting or changes in mood or alertness. If you notice changes, call for medical help. Signs of concussion may not appear for up to 48  hours.    Tell the person's partner, parent, or roommate about the injury so he or she can continue to observe the victim.  Stitches  If a cut is deep or continues to bleed, or the edges of skin don't stay together evenly, the wound may need to be closed with stitches, tape, staples, or medical glue. Any of these can help speed healing and reduce the risk for infection and the size of the scar. These may be especially important concerns with large wounds, and wounds on the head or other visible body parts.  If you think a wound may need medical care, see a healthcare provider as soon as possible. If you need stitches, they must be done in the first few hours. A wound that is not properly closed is at risk for serious infection.  Date Last Reviewed: 12/1/2017 2000-2017 [employer]. [address] [place] [address]. All rights reserved. This information is not intended as a substitute for professional medical care. Always follow your healthcare professional's instructions.

## 2018-07-02 ENCOUNTER — AMBULATORY - HEALTHEAST (OUTPATIENT)
Dept: NEUROLOGY | Facility: CLINIC | Age: 63
End: 2018-07-02

## 2018-07-02 DIAGNOSIS — S06.0XAA CONCUSSION: ICD-10-CM

## 2018-07-25 ENCOUNTER — HOSPITAL ENCOUNTER (OUTPATIENT)
Dept: NEUROLOGY | Facility: CLINIC | Age: 63
Setting detail: THERAPIES SERIES
Discharge: STILL ADMITTED OR ADMINISTRATIVE | End: 2018-07-25
Attending: NURSE PRACTITIONER

## 2018-07-25 DIAGNOSIS — Z76.89 RETURN TO WORK EVALUATION: ICD-10-CM

## 2018-07-25 DIAGNOSIS — G44.309 POST-CONCUSSION HEADACHE: ICD-10-CM

## 2018-07-25 DIAGNOSIS — F06.4 ANXIETY DISORDER DUE TO MEDICAL CONDITION: ICD-10-CM

## 2018-07-25 DIAGNOSIS — F07.81 POST CONCUSSION SYNDROME: ICD-10-CM

## 2018-08-02 ENCOUNTER — HOSPITAL ENCOUNTER (OUTPATIENT)
Dept: PHYSICAL THERAPY | Age: 63
Setting detail: THERAPIES SERIES
Discharge: STILL ADMITTED OR ADMINISTRATIVE | End: 2018-08-02
Attending: PHYSICAL THERAPIST

## 2018-08-02 DIAGNOSIS — R26.89 IMPAIRMENT OF BALANCE: ICD-10-CM

## 2018-08-02 DIAGNOSIS — F07.81 POST CONCUSSION SYNDROME: ICD-10-CM

## 2018-08-02 DIAGNOSIS — G44.309 POST-CONCUSSION HEADACHE: ICD-10-CM

## 2018-08-07 ENCOUNTER — HOSPITAL ENCOUNTER (OUTPATIENT)
Dept: OCCUPATIONAL THERAPY | Age: 63
Setting detail: THERAPIES SERIES
Discharge: STILL ADMITTED OR ADMINISTRATIVE | End: 2018-08-07
Attending: NURSE PRACTITIONER

## 2018-08-07 DIAGNOSIS — F07.81 POST CONCUSSION SYNDROME: ICD-10-CM

## 2018-08-07 DIAGNOSIS — G44.309 POST-CONCUSSION HEADACHE: ICD-10-CM

## 2018-08-07 DIAGNOSIS — H53.9 VISION ABNORMALITIES: ICD-10-CM

## 2018-08-09 ENCOUNTER — HOSPITAL ENCOUNTER (OUTPATIENT)
Dept: NEUROLOGY | Facility: CLINIC | Age: 63
Setting detail: THERAPIES SERIES
Discharge: STILL ADMITTED OR ADMINISTRATIVE | End: 2018-08-09
Attending: CLINICAL NEUROPSYCHOLOGIST

## 2018-08-09 DIAGNOSIS — F43.21 ADJUSTMENT DISORDER WITH DEPRESSED MOOD: ICD-10-CM

## 2018-08-09 DIAGNOSIS — G44.309 POST-CONCUSSION HEADACHE: ICD-10-CM

## 2018-08-09 DIAGNOSIS — F07.81 POST CONCUSSION SYNDROME: ICD-10-CM

## 2018-08-14 ENCOUNTER — HOSPITAL ENCOUNTER (OUTPATIENT)
Dept: OCCUPATIONAL THERAPY | Age: 63
Setting detail: THERAPIES SERIES
Discharge: STILL ADMITTED OR ADMINISTRATIVE | End: 2018-08-14
Attending: OCCUPATIONAL THERAPIST

## 2018-08-14 ENCOUNTER — HOSPITAL ENCOUNTER (OUTPATIENT)
Dept: PHYSICAL THERAPY | Age: 63
Setting detail: THERAPIES SERIES
Discharge: STILL ADMITTED OR ADMINISTRATIVE | End: 2018-08-14
Attending: PHYSICAL THERAPIST

## 2018-08-14 DIAGNOSIS — F07.81 POST CONCUSSION SYNDROME: ICD-10-CM

## 2018-08-14 DIAGNOSIS — H53.9 VISION ABNORMALITIES: ICD-10-CM

## 2018-08-14 DIAGNOSIS — R26.89 IMPAIRMENT OF BALANCE: ICD-10-CM

## 2018-08-23 ENCOUNTER — RECORDS - HEALTHEAST (OUTPATIENT)
Dept: ADMINISTRATIVE | Facility: OTHER | Age: 63
End: 2018-08-23

## 2018-08-23 ENCOUNTER — HOSPITAL ENCOUNTER (OUTPATIENT)
Dept: NEUROLOGY | Facility: CLINIC | Age: 63
Setting detail: THERAPIES SERIES
Discharge: STILL ADMITTED OR ADMINISTRATIVE | End: 2018-08-23
Attending: NURSE PRACTITIONER

## 2018-08-23 DIAGNOSIS — Z76.89 RETURN TO WORK EVALUATION: ICD-10-CM

## 2018-08-23 DIAGNOSIS — F07.81 POST CONCUSSION SYNDROME: ICD-10-CM

## 2018-08-23 DIAGNOSIS — G44.309 POST-CONCUSSION HEADACHE: ICD-10-CM

## 2018-08-23 DIAGNOSIS — F06.4 ANXIETY DISORDER DUE TO MEDICAL CONDITION: ICD-10-CM

## 2018-08-28 ENCOUNTER — HOSPITAL ENCOUNTER (OUTPATIENT)
Dept: OCCUPATIONAL THERAPY | Age: 63
Setting detail: THERAPIES SERIES
Discharge: STILL ADMITTED OR ADMINISTRATIVE | End: 2018-08-28
Attending: NURSE PRACTITIONER

## 2018-08-28 ENCOUNTER — HOSPITAL ENCOUNTER (OUTPATIENT)
Dept: NEUROLOGY | Facility: CLINIC | Age: 63
Setting detail: THERAPIES SERIES
Discharge: STILL ADMITTED OR ADMINISTRATIVE | End: 2018-08-28
Attending: CLINICAL NEUROPSYCHOLOGIST

## 2018-08-28 DIAGNOSIS — H53.9 VISION ABNORMALITIES: ICD-10-CM

## 2018-08-28 DIAGNOSIS — F43.23 ADJUSTMENT DISORDER WITH MIXED ANXIETY AND DEPRESSED MOOD: ICD-10-CM

## 2018-08-28 DIAGNOSIS — F07.81 POST CONCUSSION SYNDROME: ICD-10-CM

## 2018-09-20 ENCOUNTER — HOSPITAL ENCOUNTER (OUTPATIENT)
Dept: OCCUPATIONAL THERAPY | Age: 63
Setting detail: THERAPIES SERIES
Discharge: STILL ADMITTED OR ADMINISTRATIVE | End: 2018-09-20
Attending: NURSE PRACTITIONER

## 2018-09-20 ENCOUNTER — HOSPITAL ENCOUNTER (OUTPATIENT)
Dept: NEUROLOGY | Facility: CLINIC | Age: 63
Setting detail: THERAPIES SERIES
Discharge: STILL ADMITTED OR ADMINISTRATIVE | End: 2018-09-20
Attending: NURSE PRACTITIONER

## 2018-09-20 DIAGNOSIS — H53.9 VISION ABNORMALITIES: ICD-10-CM

## 2018-09-20 DIAGNOSIS — G44.309 POST-CONCUSSION HEADACHE: ICD-10-CM

## 2018-09-20 DIAGNOSIS — F06.4 ANXIETY DISORDER DUE TO MEDICAL CONDITION: ICD-10-CM

## 2018-09-20 DIAGNOSIS — F07.81 POST CONCUSSION SYNDROME: ICD-10-CM

## 2018-09-20 DIAGNOSIS — Z76.89 RETURN TO WORK EVALUATION: ICD-10-CM

## 2018-10-25 ENCOUNTER — HOSPITAL ENCOUNTER (OUTPATIENT)
Dept: NEUROLOGY | Facility: CLINIC | Age: 63
Setting detail: THERAPIES SERIES
Discharge: STILL ADMITTED OR ADMINISTRATIVE | End: 2018-10-25
Attending: NURSE PRACTITIONER

## 2018-10-25 DIAGNOSIS — F06.4 ANXIETY DISORDER DUE TO MEDICAL CONDITION: ICD-10-CM

## 2018-10-25 DIAGNOSIS — G44.309 POST-CONCUSSION HEADACHE: ICD-10-CM

## 2018-10-25 DIAGNOSIS — F07.81 POST CONCUSSION SYNDROME: ICD-10-CM

## 2018-10-25 DIAGNOSIS — Z76.89 RETURN TO WORK EVALUATION: ICD-10-CM

## 2018-12-05 ENCOUNTER — HOSPITAL ENCOUNTER (OUTPATIENT)
Dept: NEUROLOGY | Facility: CLINIC | Age: 63
Setting detail: THERAPIES SERIES
Discharge: STILL ADMITTED OR ADMINISTRATIVE | End: 2018-12-05
Attending: NURSE PRACTITIONER

## 2018-12-05 DIAGNOSIS — F06.4 ANXIETY DISORDER DUE TO MEDICAL CONDITION: ICD-10-CM

## 2018-12-05 DIAGNOSIS — F07.81 POST CONCUSSION SYNDROME: ICD-10-CM

## 2018-12-05 DIAGNOSIS — Z76.89 RETURN TO WORK EVALUATION: ICD-10-CM

## 2018-12-05 DIAGNOSIS — G44.309 POST-CONCUSSION HEADACHE: ICD-10-CM

## 2018-12-19 ENCOUNTER — HOSPITAL ENCOUNTER (OUTPATIENT)
Dept: MAMMOGRAPHY | Facility: CLINIC | Age: 63
Discharge: HOME | End: 2018-12-19
Attending: FAMILY MEDICINE | Admitting: FAMILY MEDICINE
Payer: COMMERCIAL

## 2018-12-19 DIAGNOSIS — Z12.31 VISIT FOR SCREENING MAMMOGRAM: ICD-10-CM

## 2018-12-19 PROCEDURE — 77067 SCR MAMMO BI INCL CAD: CPT

## 2019-01-07 ENCOUNTER — COMMUNICATION - HEALTHEAST (OUTPATIENT)
Dept: NEUROLOGY | Facility: CLINIC | Age: 64
End: 2019-01-07

## 2019-01-09 ENCOUNTER — HOSPITAL ENCOUNTER (OUTPATIENT)
Dept: NEUROLOGY | Facility: CLINIC | Age: 64
Setting detail: THERAPIES SERIES
Discharge: STILL ADMITTED OR ADMINISTRATIVE | End: 2019-01-09
Attending: NURSE PRACTITIONER

## 2019-01-09 DIAGNOSIS — G44.309 POST-CONCUSSION HEADACHE: ICD-10-CM

## 2019-01-09 DIAGNOSIS — F06.4 ANXIETY DISORDER DUE TO MEDICAL CONDITION: ICD-10-CM

## 2019-01-09 DIAGNOSIS — Z76.89 RETURN TO WORK EVALUATION: ICD-10-CM

## 2019-01-09 DIAGNOSIS — F07.81 POST CONCUSSION SYNDROME: ICD-10-CM

## 2019-02-13 ENCOUNTER — HOSPITAL ENCOUNTER (OUTPATIENT)
Dept: NEUROLOGY | Facility: CLINIC | Age: 64
Setting detail: THERAPIES SERIES
Discharge: STILL ADMITTED OR ADMINISTRATIVE | End: 2019-02-13
Attending: NURSE PRACTITIONER

## 2019-02-13 DIAGNOSIS — F06.4 ANXIETY DISORDER DUE TO MEDICAL CONDITION: ICD-10-CM

## 2019-02-13 DIAGNOSIS — F07.81 POST CONCUSSION SYNDROME: ICD-10-CM

## 2019-02-13 DIAGNOSIS — Z76.89 RETURN TO WORK EVALUATION: ICD-10-CM

## 2019-02-13 DIAGNOSIS — G44.309 POST-CONCUSSION HEADACHE: ICD-10-CM

## 2019-03-19 ENCOUNTER — COMMUNICATION - HEALTHEAST (OUTPATIENT)
Dept: NEUROLOGY | Facility: CLINIC | Age: 64
End: 2019-03-19

## 2019-03-27 ENCOUNTER — HOSPITAL ENCOUNTER (OUTPATIENT)
Dept: NEUROLOGY | Facility: CLINIC | Age: 64
Setting detail: THERAPIES SERIES
Discharge: STILL ADMITTED OR ADMINISTRATIVE | End: 2019-03-27
Attending: NURSE PRACTITIONER

## 2019-03-27 DIAGNOSIS — Z76.89 RETURN TO WORK EVALUATION: ICD-10-CM

## 2019-03-27 DIAGNOSIS — G44.309 POST-CONCUSSION HEADACHE: ICD-10-CM

## 2019-03-27 DIAGNOSIS — F07.81 POST CONCUSSION SYNDROME: ICD-10-CM

## 2019-03-27 DIAGNOSIS — F06.4 ANXIETY DISORDER DUE TO MEDICAL CONDITION: ICD-10-CM

## 2019-06-20 ENCOUNTER — HOSPITAL ENCOUNTER (OUTPATIENT)
Dept: NEUROLOGY | Facility: CLINIC | Age: 64
Setting detail: THERAPIES SERIES
Discharge: STILL ADMITTED OR ADMINISTRATIVE | End: 2019-06-20
Attending: NURSE PRACTITIONER

## 2019-06-20 DIAGNOSIS — Z76.89 RETURN TO WORK EVALUATION: ICD-10-CM

## 2019-06-20 DIAGNOSIS — F07.81 POST CONCUSSION SYNDROME: ICD-10-CM

## 2019-06-20 DIAGNOSIS — R41.840 LACK OF CONCENTRATION: ICD-10-CM

## 2019-06-20 DIAGNOSIS — G44.309 POST-CONCUSSION HEADACHE: ICD-10-CM

## 2019-12-27 ENCOUNTER — HOSPITAL ENCOUNTER (OUTPATIENT)
Dept: MAMMOGRAPHY | Facility: CLINIC | Age: 64
Discharge: HOME | End: 2019-12-27
Attending: FAMILY MEDICINE | Admitting: FAMILY MEDICINE
Payer: COMMERCIAL

## 2019-12-27 DIAGNOSIS — Z12.31 VISIT FOR SCREENING MAMMOGRAM: ICD-10-CM

## 2019-12-27 PROCEDURE — 77063 BREAST TOMOSYNTHESIS BI: CPT

## 2021-05-27 NOTE — PROGRESS NOTES
"Assessment:     1. Post Concussion Syndrome  2. Post Concussion Headache  3. Anxiety d/t concussion    Discussed: RED FLAGS NEEDING ACUTE EMERGENCY MANAGEMENT:                          Headaches that worsen                          Seizures                          Focal Neurologic Signs                          Drowsiness/Lethargy                          Repeated vomiting                          Slurred Speech                          Inability to recognize people/places                          Increasing confusion/irritability                          Weakness/numbness                          Neck pain                          Unusual behavioral change                          Change in level of consciousness      The patient returns to the concussion clinic for a follow up appointment, she was last seen by me on 2/13/19, where no medication changes were made.  The patient reports that she started taking a break every hour.  She actually states that all of her symptoms have resolved.  The patient denies any headaches, nausea, balance problems, dizziness, visual problems, fatigue, or sensitivity light and sound.  Cognitively she reports she has returned to her baseline, she is denying any feelings of mental fogginess, brain swelling, difficulty concentrating or remembering.  Emotionally she denies any irritability, sadness, feelings of more motion, or increased anxiety.  She also states that she is sleeping well and waking feeling rested.  She reports that family members are stating that she is \"back to her normal self\".  The patient will continue with breaks every hour and she will return in 2 months and I will discharge if she continues to be symptom-free.  Patient is denying any physical, emotional, or cognitive concussive symptoms.    Plan:     Neuropsychological assessment completed    Yes   Pain control for headaches - Tylenol only due to rebound headaches, Brittany/blue tinted glasses  PT  Completed Yes   OT "  Completed Yes    ST  Completed No   Psychology  No     MRI   Completed Yes   Labs Completed Yes      Any new medication (other provider):   No   Meds started at last appointment  No   Meds increased at last appointment    No       Sleeping Problems-        Currently on medication  No           New med  No     Anxiety due to concussion -        Currently on medication  No          New med  No      Decreased concentration and focus -       Currently on medication No         New med  Yes, Ritalin    Work note written today   Yes          Subjective:          HPI     She is a 63 y.o. female who initially presented to the concussion clinic on 7/25/8 with a blunt/closed head injury which she sustained while at work on 4/18/18. She slipped in the parking garage and fell forward hitting her head on the concrete. The point of impact was face. 2/13/19  The patient reports that her headaches are much better, she states that she has only had like 4-5 headaches since our last visit. The patient reports a significant increase in nausea and dizziness. A long discussion was held with the patient about concussion and treatment plans. 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 I have asked the patient if there is any way, with the [employer]'s and her manager's help, that we can all come up with a game plan to help make sure the patient is able to take more breaks throughout her shift    Date of accident :    4/18/18                                                          Workman's Comp   No   QRC   Yes    Present: Yes     Headaches:  Significant ongoing headaches No  Headaches: Resolved  Improvement :Yes   Current Headache No   Wake with HA  No       Physical Symptoms:  Headache-No        Improved since last visit  Yes  Nausea -No       Improved since last visit    Yes  Balance problems - No       Improved since last visit    Yes  Dizziness - No         Improved since last visit      Visual problems -   No  Improved since last visit       Yes         Fatigue - No            Sensitivity to light - No        Improved since last visit   Yes   Sensitivity to sound -   No        Improved since last visit  Yes   Numbness/tingling - No            Cognitive Symptoms  Feeling mentally foggy -No          Improved since last visit   Yes            Feeling slowed down -No           Improved since last visit  Yes   Difficulty Concentrating- No          Improved since last visit  Yes   Difficulty remembering - No         Improved since last visit  Yes     Emotional Symptoms  Irritability - No            Improved since last visit Yes   Sadness-  No       More emotional - No        Improved since last visit  Yes   Nervousness/anxiety -No        Improved since last visit  Yes     Psychiatric History:  Anxiety - No  Depression - No  Sleep Disorders - No  Any thought of hurting self or others now?   No  Any history of hurting self or others?            No    Sleep History:  Drowsiness- No           Improved since last visit   Yes   Sleep less than usual - No  Sleep more than usual - No  Trouble falling asleep - No        Does the patient wake feeling rested - Yes        Migraine Headaches      Patient history of migraines.    No      Exertion:         Do the above stated symptoms worsen with physical activity? No         Improved since last visit  Yes         Do the above stated symptoms worsen with cognitive activity? No         Improved since last visit  Yes          Work/School        Do the above stated symptoms worsen with school/work?        No        Have your returned to work/school? Yes  Hours a day    8  Days a week  4, two hours on Wednesday  34 hour only            Number of Days that you needed to leave or miss     0               There are no active  problems to display for this patient.    No past medical history on file.  No past surgical history on file.  No family history on file.  Current Outpatient Medications   Medication Sig Dispense Refill     aspirin 325 MG tablet Take 325 mg by mouth daily.       atorvastatin (LIPITOR) 20 MG tablet Take 20 mg by mouth bedtime.       CALCIUM-MAGNESIUM-ZINC ORAL Take by mouth.       CARTILAGE/COLLAGEN/BOR/HYALUR (JOINT HEALTH ORAL) Take by mouth.       hydroCHLOROthiazide (HYDRODIURIL) 12.5 MG tablet Take 12.5 mg by mouth daily.       ibuprofen (ADVIL,MOTRIN) 200 MG tablet Take 600 mg by mouth every 6 (six) hours as needed for pain.       KRILL OIL ORAL Take by mouth.       losartan (COZAAR) 100 MG tablet Take 100 mg by mouth daily.       metFORMIN (GLUCOPHAGE) 500 MG tablet Take 500 mg by mouth 2 (two) times a day with meals.       minocycline (MINOCIN,DYNACIN) 50 MG capsule Take 50 mg by mouth as needed.       TURMERIC, BULK, MISC Use As Directed.       No current facility-administered medications for this encounter.        No Known Allergies  Social History     Socioeconomic History     Marital status:      Spouse name: Not on file     Number of children: Not on file     Years of education: Not on file     Highest education level: Not on file   Occupational History     Not on file   Social Needs     Financial resource strain: Not on file     Food insecurity:     Worry: Not on file     Inability: Not on file     Transportation needs:     Medical: Not on file     Non-medical: Not on file   Tobacco Use     Smoking status: Not on file   Substance and Sexual Activity     Alcohol use: Not on file     Drug use: Not on file     Sexual activity: Not on file   Lifestyle     Physical activity:     Days per week: Not on file     Minutes per session: Not on file     Stress: Not on file   Relationships     Social connections:     Talks on phone: Not on file     Gets together: Not on file     Attends Rastafarian service: Not  on file     Active member of club or organization: Not on file     Attends meetings of clubs or organizations: Not on file     Relationship status: Not on file     Intimate partner violence:     Fear of current or ex partner: Not on file     Emotionally abused: Not on file     Physically abused: Not on file     Forced sexual activity: Not on file   Other Topics Concern     Not on file   Social History Narrative     Not on file       The following portions of the patient's history were reviewed and updated as appropriate: allergies, current medications, past family history, past medical history, past social history, past surgical history and problem list.    Review of Systems  A comprehensive review of systems was negative except for: What is noted above    Objective:       Discussion was held with the patient today regarding concussion in general including types of injury, symptoms that are common, treatment and variability in time to recover. I also provided written information about concussion and symptoms that would apply to her in her concussion folder. Education about concussion symptoms and length of time it would take the patient to recover was also given to the patient.  I have reassured the patient her symptoms are very common when a concussion is present and will improve with time. We discussed the risks and benefits of the medication including risk of worsening depression with medication adjustments and even the possibility of emergence of suicidal ideations.       Total time spent with the patient today was 30 minutes with greater than 50% of the time spent in counseling and care coordination. The patient will return in about 8 weeks to this clinic for further assessment and treatment. She agrees to call before then with any questions, concerns or problems. We will assess for the appropriateness of possible psychotropic medication trials/changes. The patient will seek out appropriate emergency services  should that become necessary.I will be available if any questions, concerns, or problems that arise.     Mental Status Examination  Patient is casually dressed and seated for evaluation. She is cooperative with questioning and eye contact is good. She is fully engaged in conversation today. She is alert and fully oriented. Speech is normal. Thought processes normal with normal prehension and expression. Thoughts are organized and linear. Content is pertinent to the conversation and without evidence of auditory or visual hallucinations. No delusional ideation. Affect/mood is euthymic-bright, even. Gen. fund of knowledge, insight and memory are normal.

## 2021-05-27 NOTE — PROGRESS NOTES
How have you been doing since we last saw you? any concerns? Symptoms?   Doing very well and has not had any symptoms since last visit.

## 2021-05-29 NOTE — PROGRESS NOTES
Assessment:     1. Post Concussion Syndrome  2. Post Concussion Headache  3. Anxiety d/t concussion    Discussed: RED FLAGS NEEDING ACUTE EMERGENCY MANAGEMENT:                          Headaches that worsen                          Seizures                          Focal Neurologic Signs                          Drowsiness/Lethargy                          Repeated vomiting                          Slurred Speech                          Inability to recognize people/places                          Increasing confusion/irritability                          Weakness/numbness                          Neck pain                          Unusual behavioral change                          Change in level of consciousness    The patient returns to the concussion clinic for a follow up visit, she was last seen by me on 3/27/19, where no medication changes were made. Patient reports she has been doing well. She has only had one headache since last visit and reports that the headache as not that severe. She was told that she was double paid by workman's comp and her employer. So she needs to repay her employer 3000 dollars, so this is causing the patient some stress. She denies any physical, emotional or cognitive concussive symptoms. She will be discharged from clinic, all care will be transferred back to primary.  Patient is to return if she has another concussion, any return of symptoms, problems or concerns      Plan:     Ordered today: No medication changes      Neuropsychological assessment completed    Yes   Pain control for headaches - Tylenol only due to rebound headaches, Brittany/blue tinted glasses  Currently doing PT  No   Completed Yes   Currently doing OT  No   Completed Yes    Currently doing ST   No   Completed No   Psychology  No      MRI  Completed Yes   Labs   completed Yes      Any new medication (other provider):   No   Meds started at last appointment  No   Meds increased at last appointment    No      Sleeping Problems-        Currently on medication  No           New med  No     Anxiety due to concussion -        Currently on medication  No          New med  No      Decreased concentration and focus -       Currently on medication No         New med  No   Work note written today   Yes     Subjective:          HPI    She is a 63 y.o. female who initially presented to the concussion clinic on 7/25/8 with a blunt/closed head injury which she sustained while at work on 4/18/18. The patient sustained a closed head injury after she slipped on ice in the parking garage at work. She fell forward and her head hit the concrete. She did not lose consciousness or experience post-traumatic amnesia. Along with a concussion, she fractured her left hand and injured her face and left knee as well. EMS took her to [place] [facility] where she was diagnosed with a concussion. Per medical records, the patient returned to work part time on 5/1 but was having difficulty tolerating her symptoms. She was ultimately referred to the [facility] by Occupational Health due to ongoing symptoms. Ms. Way was initially evaluated by me, in the Concussion Clinic on 7/25/2018. Given her persistent cognitive, emotional, and physical symptoms, the patient was referred to physical therapy and neuropsychological assessment.    Date of accident : 4/18/2018                                                       Workman's Comp   Yes   QRC   Yes     Present: Yes     Headaches:  Significant ongoing headaches Yes  Headaches: Resolved  Improvement :Yes   Current Headache No   Wake with HA  No   She has only had one headache since last visit. She reports no severe symptoms.       Physical Symptoms:  Headache-No        Nausea-No          Balance problems - No          Dizziness - No            Visual problems - No       Fatigue - No              Sensitivity to light - Yes       Since last visit  Improved     Sensitivity to sound - No           Numbness/tingling - No            Cognitive Symptoms  Feeling mentally foggy -No            Feeling slowed down -No         Difficulty Concentrating- No    Difficulty remembering - No            Emotional Symptoms  Irritability - No             Sadness-  No          More emotional - No        Nervousness/anxiety -Yes       Since last visit  Worsen       Psychiatric History:  Anxiety - No  Depression - No  Sleep Disorders - No  Any thought of hurting self or others now?   No  Any history of hurting self or others?            No    Sleep History:  Drowsiness- No       Sleep less than usual - No  Sleep more than usual - No  Trouble falling asleep - No       Does the patient wake feeling rested - Yes            Migraine Headaches      Patient history of migraines.    No      Exertion:         Do the above stated symptoms worsen with physical activity? No              Do the above stated symptoms worsen with cognitive activity? No               Work/School        Do the above stated symptoms worsen with school/work?        No        Have your returned to work/school? No full time        Number of Days that you needed to leave or miss     0           There are no active problems to display for this patient.    No past medical history on file.  No past surgical history on file.  No family history on file.  Current Outpatient Medications   Medication Sig Dispense Refill     aspirin 325 MG tablet Take 325 mg by mouth daily.       atorvastatin (LIPITOR) 20 MG tablet Take 20 mg by mouth bedtime.       CALCIUM-MAGNESIUM-ZINC ORAL Take by mouth.       CARTILAGE/COLLAGEN/BOR/HYALUR (JOINT HEALTH ORAL) Take by mouth.       hydroCHLOROthiazide (HYDRODIURIL) 12.5 MG tablet Take 12.5 mg by mouth daily.       ibuprofen (ADVIL,MOTRIN) 200 MG tablet Take 600 mg by mouth every 6 (six) hours as needed for pain.       KRILL OIL ORAL Take by mouth.       losartan (COZAAR) 100 MG tablet Take 100 mg by mouth daily.       metFORMIN  (GLUCOPHAGE) 500 MG tablet Take 500 mg by mouth 2 (two) times a day with meals.       minocycline (MINOCIN,DYNACIN) 50 MG capsule Take 50 mg by mouth as needed.       TURMERIC, BULK, MISC Use As Directed.       No current facility-administered medications for this encounter.        No Known Allergies  Social History     Socioeconomic History     Marital status:      Spouse name: Not on file     Number of children: Not on file     Years of education: Not on file     Highest education level: Not on file   Occupational History     Not on file   Social Needs     Financial resource strain: Not on file     Food insecurity:     Worry: Not on file     Inability: Not on file     Transportation needs:     Medical: Not on file     Non-medical: Not on file   Tobacco Use     Smoking status: Not on file   Substance and Sexual Activity     Alcohol use: Not on file     Drug use: Not on file     Sexual activity: Not on file   Lifestyle     Physical activity:     Days per week: Not on file     Minutes per session: Not on file     Stress: Not on file   Relationships     Social connections:     Talks on phone: Not on file     Gets together: Not on file     Attends Mosque service: Not on file     Active member of club or organization: Not on file     Attends meetings of clubs or organizations: Not on file     Relationship status: Not on file     Intimate partner violence:     Fear of current or ex partner: Not on file     Emotionally abused: Not on file     Physically abused: Not on file     Forced sexual activity: Not on file   Other Topics Concern     Not on file   Social History Narrative     Not on file       The following portions of the patient's history were reviewed and updated as appropriate: allergies, current medications, past family history, past medical history, past social history, past surgical history and problem list.    Review of Systems  A comprehensive review of systems was negative except for: What is  noted above    Objective:       Discussion was held with the patient today regarding concussion in general including types of injury, symptoms that are common, treatment and variability in time to recover. I also provided written information about concussion and symptoms that would apply to her in her concussion folder. Education about concussion symptoms and length of time it would take the patient to recover was also given to the patient.  I have reassured the patient her symptoms are very common when a concussion is present and will improve with time. We discussed the risks and benefits of the medication including risk of worsening depression with medication adjustments and even the possibility of emergence of suicidal ideations.       Total time spent with the patient today was 30 minutes with greater than 50% of the time spent in counseling and care coordination. The patient will return to this clinic for further assessment and treatment if she has another concussion or any return of symptoms. She agrees to call before then with any questions, concerns or problems. We will assess for the appropriateness of possible psychotropic medication trials/changes. The patient will seek out appropriate emergency services should that become necessary.I will be available if any questions, concerns, or problems that arise.     Mental Status Examination  Patient is casually dressed and seated for evaluation. She is cooperative with questioning and eye contact is good. She is fully engaged in conversation today. She is alert and fully oriented. Speech is normal. Thought processes normal with normal prehension and expression. Thoughts are organized and linear. Content is pertinent to the conversation and without evidence of auditory or visual hallucinations. No delusional ideation. Affect/mood is euthymic-bright, even. Gen. fund of knowledge, insight and memory are normal.

## 2021-06-08 NOTE — PROGRESS NOTES
[facility] SPINE SURGERY SERVICE CONSULTATION NOTE    2/3/2017     Aishwarya Way is a 61 y.o. female who is sent to us in consultation by Sara Youngblood with low back pain with radiating pain down her right buttocks, she has had a jt and that has helped her, previously her pain was a 10/10 she then took a medrol dose pack followed by a JT at [facility], now her pain is a 0-2/10, her pain is always on the right. Alleviated by ice packs and steriods.       REVIEW OF SYSTEMS:  ROS reviewed with pt as documented on pt health form of 2/3/2017.    Negative cardiac, pulmonary, hematological.  No family hx of anesthetic reactions.  No family hx of hypercoagulability. Endorses Diabetes.        MEDICATIONS:  No current outpatient prescriptions on file.     No current facility-administered medications for this encounter.          ALLERGIES/SENSITIVITIES:     No Known Allergies    PERTINENT SOCIAL HISTORY:   Social History     Social History     Marital status:      Spouse name: N/A     Number of children: N/A     Years of education: N/A     Social History Main Topics     Smoking status: Not on file     Smokeless tobacco: Not on file     Alcohol use Not on file     Drug use: Not on file     Sexual activity: Not on file     Other Topics Concern     Not on file     Social History Narrative         FAMILY HISTORY:  No family history on file.     PHYSICAL EXAM:   Constitutional:   Visit Vitals     BP (!) 178/101     Pulse 100     SpO2 98%        Mental Status: A & O in no acute distress.  Affect is appropriate.  Speech is fluent.  Recent and remote memory are intact.  Attention span and concentration are normal.     Cranial Nerves: Grossly intact    Range of Motion: negative     Provacative Testing: negative      Motor: No pronator drift of upper extremity. Normal bulk and tone all muscle groups of upper and lower extremities. 5/5     Sensory: Sensation intact bilaterally to light touch.      Coordination:  Heel/toe/   gait intact.     tandem gait      Reflexes; supinator, biceps, triceps, knee/ ankle jerk intact. negative  babinski/ clonus.    IMAGING:I personally reviewed all radiographic images    CDI  MRI   DDD at multiple levels with L3-4 with central canal stenosis and L2-3 with impingement on the right L3       CONSULTATION ASSESSMENT AND PLAN:    Patient has radiating pain into her buttocks right > left but it is better since her ROBBY, patient should repeat her TFESI on the right L3-4 in the future as needed, due to her diabetes she should be limited to 3 injections in a rolling 12 months.   F/U as needed.       Robert Marin MD  Spine Surgeon  [facility] [place]    Cc:   Sara Youngblood MD  [id number]

## 2021-06-18 NOTE — LETTER
Letter by Cristin Villa FNP at      Author: Cristin Villa FNP Service: -- Author Type: --    Filed:  Date of Service:  Status: (Other)       December 5, 2018     Patient: Aishwarya Way   YOB: 1955   Date of Visit: 12/5/2018       To Whom It May Concern:    It is my medical opinion that Aishwarya Way may return to work on 12/6/18.  Employees recovering from concussions often exhibit cognitive symptoms that make attending work and learning difficult. They may not be able to attend work or only partial days. Some symptoms that could affect performance in the work environment  include: sensitivity to light and noise, headache, trouble focusing, concentrating, or remembering, and difficulty looking at a screen. The accommodations below often help reduce the symptoms and allow them to return to work quicker. Compliance with these accommodations allows the brain to recover more quickly even if it appears the employee is symptom free.     Attendance Restrictions  Patient will still work only 4 days a week (Monday, Tuesday, Thursday and Friday), if she is able to tolerate (tolerate is no return of severe symptoms). If no return of symptoms on 12/12/19 she will return to full time hours. If not able to tolerate we will reduce by 30 minutes for two shifts then attempt increase again. Patient will return in 5 weeks and we will reassess for return to work.    Other Accommodations:  1. Allow patient to take a break if she starts to have symptoms, If symptoms continue or worsens please allow patient to return home.  2. Allow patient to take 10 minute breaks every 60 minutes   3. Please put a screen filter on the patient's computer   4. Allow employee to wear sunglasses and hat to help patient's sensitivity to lights  .  5. If patient wakes with severe headache please allow patient to start work later, it symptoms worsen patient should not attempt to work.  6. Excuse patient for all medical  appointments      If you have any questions or concerns, please don't hesitate to call.    Sincerely,        Electronically signed by FELICIANO Pritchard

## 2021-06-18 NOTE — LETTER
Letter by Cristin Villa FNP at      Author: Cristin Villa FNP Service: -- Author Type: --    Filed:  Date of Service:  Status: (Other)       February 13, 2019     Patient: Aishwarya Way   YOB: 1955   Date of Visit: 2/13/2019       To Whom It May Concern:    It is my medical opinion that Aishwarya Way may return to work on 2/13/19. Employees recovering from concussions often exhibit cognitive symptoms that make attending work and learning difficult. They may not be able to attend work or only partial days. Some symptoms that could affect performance in the work environment  include: sensitivity to light and noise, headache, trouble focusing, concentrating, or remembering, and difficulty looking at a screen. The accommodations below often help reduce the symptoms and allow them to return to work quicker. Compliance with these accommodations allows the brain to recover more quickly even if it appears the employee is symptom free.     Attendance Restrictions  Patient will still work only 4 days a week (Monday, Tuesday, Thursday and Friday), if she is able to tolerate (tolerate is no return of severe symptoms) She will work 8 hours a day for 4 days a week. She will continue to work 2 hours on Wednesday. Patient will return in 5 weeks and we will reassess for return to work.    Other Accommodations:  1. Allow patient to take a break if she starts to have symptoms, If symptoms continue or worsens please allow patient to return home.  2. Allow patient to take 10 minute breaks every 60 minutes   3. Please put a screen filter on the patient's computer   4. Allow employee to wear sunglasses and hat to help patient's sensitivity to lights  .  5. If patient wakes with severe headache please allow patient to start work later, it symptoms worsen patient should not attempt to work.  6. Excuse patient for all medical appointments      If you have any questions or concerns, please don't hesitate to  call.    Sincerely,        Electronically signed by FELICIANO Pritchard

## 2021-06-18 NOTE — LETTER
Letter by Cristin Villa FNP at      Author: Cristin Villa FNP Service: -- Author Type: --    Filed:  Date of Service:  Status: (Other)       January 9, 2019     Patient: Aishwarya Way   YOB: 1955   Date of Visit: 1/9/2019       To Whom It May Concern:    It is my medical opinion that Aishwarya Way may return to work on 1/10/19. Employees recovering from concussions often exhibit cognitive symptoms that make attending work and learning difficult. They may not be able to attend work or only partial days. Some symptoms that could affect performance in the work environment  include: sensitivity to light and noise, headache, trouble focusing, concentrating, or remembering, and difficulty looking at a screen. The accommodations below often help reduce the symptoms and allow them to return to work quicker. Compliance with these accommodations allows the brain to recover more quickly even if it appears the employee is symptom free.     Attendance Restrictions  Patient will still work only 4 days a week (Monday, Tuesday, Thursday and Friday), if she is able to tolerate (tolerate is no return of severe symptoms). If no return of severe symptoms on 1/21/19 she will start working on Wednesdays. She will begin by working 2 hours one Wednesday. Patient will return in 5 weeks and we will reassess for return to work.    Other Accommodations:  1. Allow patient to take a break if she starts to have symptoms, If symptoms continue or worsens please allow patient to return home.  2. Allow patient to take 10 minute breaks every 60 minutes   3. Please put a screen filter on the patient's computer   4. Allow employee to wear sunglasses and hat to help patient's sensitivity to lights  .  5. If patient wakes with severe headache please allow patient to start work later, it symptoms worsen patient should not attempt to work.  6. Excuse patient for all medical appointments      If you have any questions or  concerns, please don't hesitate to call.    Sincerely,        Electronically signed by FELICIANO Pritchard

## 2021-06-19 NOTE — PROGRESS NOTES
BRIEF NEUROPSYCHOLOGICAL CONSULTATION    NAME: Aishwarya Way  YOB: 1955     DATE OF EVALUATION: 8/9/2018    REASON FOR REFERRAL:  Ms. Aishwarya Way is a 63 y.o., right-handed, [place] female who presents to the [facility] for further evaluation and management of a concussion injury she sustained on 4/18/2018.  She was referred for neuropsychological consultation by Cristin Villa CNP to provide information regarding cognitive and emotional functioning following her mild brain injury.  The circumstances surrounding Ms. Way's injury are well-documented in the electronic medical record; therefore, only the most pertinent details will be reiterated below.    The patient sustained a closed head injury after she slipped on ice in the parking garage at work. She fell forward and her head hit the concrete. She did not lose consciousness or experience post-traumatic amnesia. Along with a concussion, she fractured her left hand and injured her face and left knee as well. EMS took her to [place] [facility] where she was diagnosed with a concussion. Per medical records, the patient returned to work parttime on 5/1 but was having difficulty tolerating her symptoms. She was ultimately referred to the [facility] by Occupational Health due to ongoing symptoms.    Ms. Way was initially evaluated by FELICIANO Pritchard, in the [facility] on 7/25/2018. Given her persistent cognitive, emotional, and physical symptoms, the patient was referred to physical therapy and for this neuropsychological assessment.    Verbal consent for neuropsychological testing was received following the provision of information about the nature and purpose of the evaluation, and the opportunity to ask questions.     HISTORY OF PRESENT ILLNESS:  With regard to her cognitive symptoms, the patient reported experiencing word-finding difficulty, slowed processing speed, and  forgetfulness since the concussion. With regard to memory  More specifically, she reported forgetting what she reads, forgetting conversations, and repeating herself. She has more frequent word-finding problems and will fill in the word with whatever language pops into her head first (she is fluent in Yorube and English). These issues have remained largely stable over time.    Emotionally, the patient reported feeling extremely stressed and more emotional since the concussion. She is more tearful and feels guilty missing so much work. She just received her first paycheck since the injury, so finances have also been a stressor for her. In addition, her employers have not been very supportive of her recovery, and she feels pressured by them to return faster than what her providers are allowing her. She also does not feel ready to work more than she is now. SI/HI were denied. With regard to sleep, the patient stated that she initially had sleep initiation difficulties a couple of times after the head injury, but her sleep has improved over time. She currently gets about 10-11 hours per night and takes naps after work. She does not awaken feeling well-rested, however, which is a change for her since the concussion. Current substance abuse was denied.     The patient completed a subsection of the Sports Concussion Assessment Tool-3 (SCAT-3) that assesses post-concussive symptoms. Out of 22 possible symptoms, the patient endorsed 20 of them. Her reported symptom severity score was 70 out of 132, suggesting that the symptoms she experiences are generally moderate in intensity. Specifically, she endorsed mild symptoms of neck pain, dizziness, difficulties with sleep onset and/or maintenance, confusion, sadness and mental fogginess.  Moderate symptoms of headache, blurred vision, balance difficulites, fatigue, not feeling right, attention/concentration problems, memory problems and irritability were also endorsed. Lastly,  severe symptoms of pressure in head, phonosensitivity and photosensitivity were reported.     With regard to her engagement in the activities of daily living, Ms. Way reported that she has limited or avoided most of her normal activities at this point. She is only working 3 days a week for 6-hour shifts. She attempted working 8-hour shifts at one point in May but could not tolerate symptoms. While she used to be an avid exerciser, she now only walks for about 10-45 minutes at a time and requires frequent rest breaks to control her symptoms. Reading gives her a headache, as does looking at screens for any length of time.    PAST MEDICAL HISTORY:  According to medical records, the patient has diabetes mellitus-type 2, obesity, osteoarthritis, and hyperlipidemia.    FAMILY MEDICAL HISTORY:  No family history on file.    DIAGNOSTIC STUDIES:  A recent head CT (4/18/2018) was reportedly negative for acute intracranial pathology.     CURRENT MEDICATIONS:  Current Outpatient Prescriptions:      aspirin 325 MG tablet, Take 325 mg by mouth daily., Disp: , Rfl:      atorvastatin (LIPITOR) 20 MG tablet, Take 20 mg by mouth bedtime., Disp: , Rfl:      CALCIUM-MAGNESIUM-ZINC ORAL, Take by mouth., Disp: , Rfl:      CARTILAGE/COLLAGEN/BOR/HYALUR (JOINT HEALTH ORAL), Take by mouth., Disp: , Rfl:      hydroCHLOROthiazide (HYDRODIURIL) 12.5 MG tablet, Take 12.5 mg by mouth daily., Disp: , Rfl:      ibuprofen (ADVIL,MOTRIN) 200 MG tablet, Take 600 mg by mouth every 6 (six) hours as needed for pain., Disp: , Rfl:      KRILL OIL ORAL, Take by mouth., Disp: , Rfl:      losartan (COZAAR) 100 MG tablet, Take 100 mg by mouth daily., Disp: , Rfl:      metFORMIN (GLUCOPHAGE) 500 MG tablet, Take 500 mg by mouth 2 (two) times a day with meals., Disp: , Rfl:      minocycline (MINOCIN,DYNACIN) 50 MG capsule, Take 50 mg by mouth as needed., Disp: , Rfl:      TURMERIC, BULK, MISC, Use As Directed., Disp: , Rfl:     PSYCHIATRIC HISTORY:  Ms.  Seamus denies any history of psychiatric diagnosis, treatment, or hospitalization.    SUBSTANCE USE HISTORY:  Ms. Way denies any history of chemical dependency diagnosis, treatment, or hospitalization. She was a never smoker.    RELEVANT SOCIAL HISTORY:  Ms. Way was born and raised in [place] and [place] the [place]. 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 She earned mostly above average grades. Significant learning difficulties or developmental attention issues were denied. 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 She currently works full-time as a  at [employer]. Her duties require her to work at a computer, participate in video-conference meetings, write emails, and perform web searches. She has been  for 35 years, and they have 3 children together. The patient and her  live together in their own home in [place], [place], along with their daughter and two grandchildren.    SERVICES & TESTS ADMINISTERED:  For diagnostic and coding purposes, Ms. Way has a history of mild traumatic brain injury and was referred for an evaluation of mild neurocognitive disorder. Today s evaluation consisted of 3 hours of professional neuropsychologist time, including 1 hour of time spent in neurobehavioral status exam (44342); 1 hours spent in medical record review, administration of WRAT-4 Word Reading, interpretation and integration of all tests, report preparation, and provision of education and feedback at the completion of today's appointment (24334); and 1 hour of time spent in the administration of the remainder of the testing battery by a trained examiner and interpreted by the neuropsychologist.    The test battery included Wide Range Achivement Test-IV (select subtests), Repeatable Battery for the Assessment of Neuropsychological Status-Update, Trailmaking Test, Patient  Health Questionnaire-9, the Generalized Anxiety Disorder-7 Assessment, and a subsection of the SCAT-3 post-concussive symptom questionnaire.    MENTAL STATUS EXAM AND BEHAVIORAL OBSERVATIONS:  Ms. Way arrived on time and accompanied by her  to today's appointment. She was appropriately dressed and groomed. She appeared alert and engaged. Gait and other motor activity appeared normal. No vision or hearing difficulties were observed. Conversational speech was of normal volume and prosody; however, her rate of speech was slowed. Frequent word-finding pauses were noted. Her thought process appeared generally linear and goal-directed, although she occasionally lost her train of thought. No hallucinations or delusions were apparent. Judgment and insight appeared intact. Her mood was dysphoric and her affect was restricted. She became tearful multiple times during the interview when talking about her stressors. Rapport was easily established and eye contact was unremarkable. She was pleasant and cooperative throughout the evaluation. She understood test instructions without difficulty. She did report worsening headache during the testing. Nevertheless, Ms. Way appeared adequately motivated and engaged easily during testing. Therefore, the following results are considered a valid estimation of her current cognitive abilities.    ESTIMATED OPTIMAL PREMORBID FUNCTIONING:  Optimal premorbid intellectual abilities were estimated as falling in the average range based on Ms. Way's educational and occupational histories and performance on a task least likely to be affected by acquired brain dysfunction.    TEST RESULTS:  Ms. Way appeared fully oriented.  Auditory attention and working memory performances fell in the low average range of functioning.  Specifically, she was able to immediately recall up to 4 digits in forward order (borderline impaired), up to 4 digits in backwards order (average), and  up to 4 digits in sequence (low average).    Comprehension appeared fully intact. Confrontation naming was impaired. Her performance on a measure of semantic verbal fluency fell in the impaired range of functioning overall. Of note, cultural and linguistic factors may have affected these performances to some degree.    Cognitive speed and processing accuracy fell in the moderately impaired range of functioning on a task that required her to use numbers to rapidly decode a series of abstract symbols.  Her performance fell in the low average range on a task that required her to quickly connect a series of numbers presented in a visual array on a page. Her performance was in the low average range on a subsequent task that required mental flexibility and set-shifting to quickly alternate between sequencing letters and numbers presented on a page.    Visual attention and spatial localization skills were borderline impaired , and may be secondary to vision/oculomotor disturbances rather than cerebral dysfunction. Two dimensional visuoconstruction of a geometric design was in the average range relative to peers.     With regard to learning and memory, Ms. Way was also administered measure of rote auditory verbal list learning that required her to learn a series of 10 words over  trials and retain and recall them over a long (20 minute) delay.  Her initial rate of learning fell in the mildly impaired range of functioning (raw score recall over trials = 3, 4, 5, 7).  Ms. Way retained and recalled approximately 57% of the previously learned information over the long delay (average performance).  Recognition memory was impaired.  Contextual auditory verbal learning abilities were moderately impaired and she retained and recalled 40% of the previously learned information over a delay (moderately impaired).  Her recall of a geometric design was in the low average range, with a 44% retention rate.    On the Patient  Health Questionnaire-9, a self report measure of depressive symptomatology, she obtained a score of 1, placing her in the range of minimal depression.  She denied suicidal ideation. On the Generalized Anxiety Disorder-7, a self-report measure of anxiety, she obtained a score of 0,  placing her in the range of normal anxiety.    DIAGNOSTIC IMPRESSIONS & RECOMMENDATIONS:  Ms. Aishwarya Way is a 63 y.o., right-handed, [place] female, with a history of recent mild traumatic brain injury who was referred for a brief neuropsychological screening evaluation by Cristin Villa CNP to assist with treatment planning.     An abbreviated neurocognitive evaluation was conducted and she was an engaged and cooperative participant. Optimal premorbid abilities were estimated as falling in the average range of functioning. However, several of her performances fall well below that estimate. Specifically, she exhibits variable attention/concentration and processing speed abilities, which in turn likely contributed to her impaired verbal learning efficiency and variable verbal memory. With regard to emotional functioning, the patient denied significant symptoms of depression or anxiety on self-report measures but acknowledged numerous stressors and became tearful throughout the clinical interview. Therefore, it appears that there is an element of situational depression that the patient is experiencing but may be hesitant to fully acknowledge. Overall, etiology of the current deficits is likely a combination of physical symptoms/discomfort (e.g., headaches, fatigue), significant stress, and possibly residual sequelae from her relatively recent concussion.     At the end of the session, I met with the patient to discuss her experience with testing, review the results, provide feedback and education, and discuss my recommendations moving forward.    1) Time was spent discussing the pathophysiology of concussion injury, normalizing  "the patient's experience, and providing education about the anticipated recovery trajectory. She was receptive to education about the fact that people recover from mild traumatic brain injuries and that her cognitive inefficiencies and physical symptoms will not be permanent and are expected to continue to improved steadily in coming weeks. She will benefit from hearing this clear and consistent message about recovery from concussion.     2) We discussed how physical, emotional, and cognitive symptoms are highly interconnected and influence one another. She was encouraged to continue participating in PT and OT, as improvements in her physical symptoms may elicit improved cognitive efficiency and stress.     3) Education was also provided on the impact of emotional distress and how that can cause or exacerbate headaches and other physical symptoms. I suggested that she meet with our  to discuss her emotional reaction to the concussion and other related stressors; however, the patient declined this service at this time. She agreed to monitor her emotional symptoms and will let us know if she changes her mind.    4) We discussed the fact that total avoidance of screens, reading, and engaging in the normal activities of daily living is not necessary. Education was provided about the fact that some activities may temporarily flare-up her symptoms, but that it is important for her to re-engage in them slowly and steadily so that she may be able to build up her tolerance for normal activities. The one exception is driving, which she should not do if she is experiencing significant physical symptoms/fatigue. We discussed the importance of taking rest breaks after persistent engagement in cognitively and physically demanding tasks. We discussed the rule of 50/10 during which fifty minutes of cognitively demanding tasks are followed by a ten minute break to relax and \"power down\" so to help delay/prevent a " flare-up of physical symptoms.    5) We discussed the benefits of compensatory and organizational strategies to optimize her functioning in daily life (i.e., lists, calendar system, pillbox, etc.).     6) At this point, her current cognitive inefficiencies suggest that she is not quite ready to return to full-time employment. At this point, I will make no changes to her [id number]-work plan. She will revisit the plan at her next meeting with FELICIANO Pritchard.     7) Return for follow-up with neuropsychology in approximately 3 weeks.        Thank you for the opportunity to assist in the evaluation and care of Ms. Way. Please do not hesitate to contact me with any questions regarding my findings or recommendations.      Sara Arteaga PsyD, LP  Licensed [place] [address]  Phone: [phone]

## 2021-06-19 NOTE — PROGRESS NOTES
Concussion date/cause of injury: April 18th , Pt says fell and hit her head on the concrete    How have you been doing since we last saw you? any concerns? Pt. Says she has concerns and has headaches    Current Symptoms : Yes , headaches

## 2021-06-19 NOTE — PROGRESS NOTES
Physical Therapy  Therapy Initial Plan of Care      Medical Diagnosis: s/p Concussion         Treatment Dx: Balance Impairment  Referring MD:Cristin Villa  Onset date 4/18/2018     Start of Care 8/2/2018      Assessment:  Pt is a 62 yo female who presents to PT with s/p a work related injury when she fell in a parking garage, striking her forehead and the majority of her L face, (-)LOC, (-) PTA.  Pt continues to experience severe HAs and notes some balance issues.  The PT reveals abnormal oculomotor findings with smooth pursuit and saccades; the pt will be following up with OT for vision therapy next week.  Additionally, the eval reveals good cervical AROM with minimal to no pain but some imbalance with the mCTSIB and dizzy symptoms provoked with a HIT.  Additional testing is warranted to r/o BPPV but pt may have a vestibular hypofunction and both can be tested with goggles next Rx session.  The pt's HAs do not appear musculoskeletal as her cervcial AROM is good and pt has minimal pain and notes her neck pain is getting better.  Pt would benefit from skilled 1:1 PT to address her physical and functional deficits.    Prognostic Indicators:  Rehabilitation potential: Good    Impairment:  Gait, Balance, Dizziness and Pain    Functional Goals:  to be met by 8 visits  Pt will:  1.  Score >23/28 on the FGA for overall good stability and demo a reduced fall risk.  2.  Complete > 30 sec on foam with EC to demo improved balance.  3.  Be independent with HEP for ongoing symptom management.       Plan of Care:  Communication with referral source, patient, caregiver., Paitent/Family Instruction: Treatment plan/rationale, home exercise program, expected functional outcome., Therapeutic Exercise, Neuromuscular reeducation, Mobility/Transfer Training, Gait Training, Balance Training, Therapeutic Activity and Canal Respositioning    Frequency / Duration: 1-2 x/week for  8 weeks Up to 8 visits    Ronel Figueredo, PT, DPT, MTC,  NCS   8/2/2018         Physician Recommendation:  1. I certify the need for these services furnished within this plan and while under my care. I agree with the therapist's recommendation for plan of care.    2. If there is any recommendation for modification of therapy plan, please indicate below.      Physician's Signature (Printed):  _____________________________

## 2021-06-19 NOTE — LETTER
Letter by Cristin Villa FNP at      Author: Cristin Villa FNP Service: -- Author Type: --    Filed:  Date of Service:  Status: (Other)         June 20, 2019     Patient: Aishwarya Way   YOB: 1955   Date of Visit: 6/20/2019       To Whom It May Concern:    It is my medical opinion that Aishwarya Way may return to work on 6/20/19.  Patient can continue to experience sensitivity to light and noise for up to 18 months. The accommodations below should remain in place for up to 18 months.    Accommodations:  1. Allow patient to take a break if she starts to have symptoms    If you have any questions or concerns, please don't hesitate to call.    Sincerely,        Electronically signed by FELICIANO Pritchard

## 2021-06-19 NOTE — PROGRESS NOTES
Assessment:     1. Concussion, with loss of consciousness for minutes  2. Post concussion syndrome  3. Dizziness  4. Headaches    Plan:     Cognitive Impairment- Neuropsychological assessment (2 hours), rest  Pain control for headaches - Tylenol only due to rebound headaches, Brittany/blue tinted glasses  Dizziness and headache - PT to evaluate and treat  Sleeping Problems-monitor, sleep hygiene   Anxiety due to concussion - monitor  Decreased concentration and focus - monitor  Return to Work/School- she returned to work 5/1/18 for 4-6 hours 5 days a week until 8/24/18    Subjective:          She is a 63 y.o. female who presents with a blunt/closed head injury which she sustained while at work on 4/18/18. She slipped in the parking garage and fell forward hitting her head on the concrete. The point of impact was face. Since the injury, she has had a headache. She does not have retrograde and anterograde amnesia. She has had 0 previous head injuries. First symptoms were immediately where she HA and confusion. With regard to cognitive symptoms, she endorsed significant ongoing concerns with her memory and that she has difficulties with attention and concentration as well as slowed thinking. In terms of emotional symptoms, she noted that in general she feels more emotional. She did acknowledge becoming more easily irritated and frustrated, she also reports feeling more sadness and nervousness. Finally, with regard to physical symptoms, she endorsed significant ongoing headaches noting that she has headaches contiguously. She indicated that approximately every other day these headaches are particularly bad such that she would rate them as a 10 on a 1-10 rating scale. On average she would describe her headaches as being at about a 3/10. At her best her headaches are a 3/10. The patient reports she is not sure what makes her symptoms worse, and rest makes her symptoms better. She indicated that she takes acetaminophen  "(Tylenol) and ibuprofen (Advil) and it takes the edge off, but not always. She stated also that she experiences  nausea without vomiting, balance problems (no additional falls) dizziness, worsen eye site, as well as blurred vision, fatigue, sensitivity to light and noise. She also described sleep disturbance. She experiences drowsiness, is sleeping more than usual, does not have difficulty falling asleep and when she wakes she does not feel rested.    Patient was referred to the concussion clinic by Occupational health. The patient was born in [place] and came to [place] in [place]. She is currently living with her  of 35 years in their family home. They have 3 adult children, their 11 and 17 year old granddaughters and one daughter lives in the home     She is currently employed as a  and works at [employer].  The concussion symptoms are limiting her ability to work, she has problems with looking at a computer, HAs worsen while at work. Her concussion is work related. Personal interests include spending time with grandchildren. The patient has been able to do these activities since her injury, but it is harder to do. She normally exercises frequently by walking and water exercises, but has not been able to exercise since the injury. She has never smoked, never drinks, and has about 1 product with caffeine per week. She is currently driving, driving is \"okay\". Her education includes associates degree. She reports having good grades through high school and college. She reports learning best by doing. She denies any developmental problems, learning disabilities, or history of ADHD. She does not have any culture or Hindu concerns regarding her treatment. The patient denies being a victim of physical, emotional, financial, or sexual abuse. There is not currently any evidence of a blow to the head.     The patient did see her primary after the injury, who instructed the patient to Occupational " health. The patient reports that her activities since the injury have been resting.  Patient reports being 70 percent back to her normal activity.  The patient denies any unexplained weight loss or gain. The patient reports that the symptoms wake her up at night. She reports feeling down, depressed, or hopeless. The patient denies being bothered by having little interest or pleasure in doing things. She denies being currently pregnant or thinking she might be pregnant.    Physical Symptoms:  Headache-Yes, located all over or in back of head, describe as pressure  Nausea- Yes  Vomiting - No  Balance problems - Yes  Dizziness - Yes  Visual problems - Yes, blurry vision, worsening eye site  Fatigue - Yes  Sensitivity to light - Yes  Sensitivity to sound - Yes  Numbness/tingling - No      Cognitive Symptoms  Feeling mentally foggy - Yes  Feeling slowed down - Yes  Difficulty Concentrating- Yes  Difficulty remembering - Yes    Emotional Symptoms  Irritability - Yes  Sadness- Yes  More emotional - Yes  Nervousness/anxiety - Yes      Psychiatric History:  Anxiety - No  Depression - No  Sleep Disorders - No    Family Psychiatric History:  none      Sleep History:  Drowsiness- Yes  Sleep less than usual - No  Sleep more than usual - Yes  Trouble falling asleep - No  Does the patient wake feeling rested - Yes      Previous concussions  Yes     Headaches  The patient does have any history of migraines. The patient states that her family also does not have a history of migraines    There are no active problems to display for this patient.    No past medical history on file.  No past surgical history on file.  No family history on file.  Current Outpatient Prescriptions   Medication Sig Dispense Refill     aspirin 325 MG tablet Take 325 mg by mouth daily.       atorvastatin (LIPITOR) 20 MG tablet Take 20 mg by mouth bedtime.       CALCIUM-MAGNESIUM-ZINC ORAL Take by mouth.       CARTILAGE/COLLAGEN/BOR/HYALUR (JOINT HEALTH ORAL)  Take by mouth.       hydroCHLOROthiazide (HYDRODIURIL) 12.5 MG tablet Take 12.5 mg by mouth daily.       ibuprofen (ADVIL,MOTRIN) 200 MG tablet Take 600 mg by mouth every 6 (six) hours as needed for pain.       KRILL OIL ORAL Take by mouth.       losartan (COZAAR) 100 MG tablet Take 100 mg by mouth daily.       metFORMIN (GLUCOPHAGE) 500 MG tablet Take 500 mg by mouth 2 (two) times a day with meals.       minocycline (MINOCIN,DYNACIN) 50 MG capsule Take 50 mg by mouth as needed.       TURMERIC, BULK, MISC Use As Directed.       No current facility-administered medications for this encounter.        No Known Allergies  Social History     Social History     Marital status:      Spouse name: N/A     Number of children: N/A     Years of education: N/A     Occupational History     Not on file.     Social History Main Topics     Smoking status: Not on file     Smokeless tobacco: Not on file     Alcohol use Not on file     Drug use: Not on file     Sexual activity: Not on file     Other Topics Concern     Not on file     Social History Narrative       The following portions of the patient's history were reviewed and updated as appropriate: allergies, current medications, past family history, past medical history, past social history, past surgical history and problem list.  Review of Systems  A comprehensive review of systems was negative except for: HA, mental fogginess    Objective:       Vitals:    07/25/18 1030   BP: 157/90   Pulse: 70       Imaging:  CT Head: Reviewed    Discussion was held with the patient today regarding concussion in general including types of injury, symptoms that are common, treatment and variability in time to recover. I also provided written information about concussion and symptoms that would apply to her in her concussion folder. Education about concussion symptoms and length of time it would take the patient to recover was also given to the patient.  I have reassured the patient her  symptoms are very common when a concussion is present and will improve with time. We discussed the risks and benefits of the medication including risk of worsening depression with medication adjustments and even the possibility of emergence of suicidality      Total time spent with the patient today was 60 minutes with greater than 50% of the time spent in counseling and care coordination. The patient will return in about 4 weeks to this clinic for further assessment and treatment. She agrees to call before then with any questions, concerns or problems. We will assess for the appropriateness of possible psychotropic medication trials/changes. The patient will seek out appropriate emergency services should that become necessary.I will be available if any questions, concerns, or problems that arise.     Physical Exam: General appearance: alert, appears stated age, cooperative and no distress. Yes  Sensitivity to light. Yes  Head: Normocephalic, without obvious abnormality, atraumatic Yes  Neck:  Full ROM  Yes with pain or stiffness Yes    Neurologic:   Mental status: Alert, oriented, thought content appropriate, affect: mood-congruent. Recent and remote memory grossly intact.  Yes  Speech is clear and fluent with no obvious word finding or paraphasic errors. Yes  Pupils are equal and react to light direct and consensual accommodation.Yes  EOMs are intact   Yes  nystagmus. Yes   Exophoria/exotropia noted. No  Visual fields are grossly full. Yes  VOR grossly intact. Yes  Saccade and smooth pursuit grossly intact. No  Full facial movements, Yes  Tongue protrudes midline soft palate rises symmetrically. Yes  Shoulder shrug is intact. Yes  Strength is 5/5, No pronator drift. Yes  Accurate finger to nose and sensation is intact to double simultaneous stimulation.Yes   Reflexes are symmetrical Yes  Toes are down going. Yes  Romberg is positive   Able to toe, heel, and tandem walk without difficulty No      Mental Status  Examination  Patient is casually dressed and seated for evaluation. She is cooperative with questioning and eye contact is good. She is fully engaged in conversation today. She is alert and fully oriented. Speech is normal. Thought processes normal with normal prehension and expression. Thoughts are organized and linear. Content is pertinent to the conversation and without evidence of auditory or visual hallucinations. No delusional ideation. Affect/mood is euthymic-bright, even. Gen. fund of knowledge, insight and memory are normal.

## 2021-06-19 NOTE — PROGRESS NOTES
"Occupational Therapy    OCCUPATIONAL THERAPY TREATMENT SESSION      Rx Units: 4 ADL    Total Minutes: 55 minutes  Treatment #:   Insurance Carrier: [employer]  : 1955      Medical Diagnosis: Post Concussion Syndrome                                                                                                           Treatment Dx: Visual disturbance/changes that affect daily/work tasks.  Referring MD: FELICIANO Pritchard  Onset date: 18                 Start of Care: 18     Pain ratin-3/10  Location: headache, did take tylenol at 5am       Goes by Jocy  SUBJECTIVE:    Pt arrived on time, well rested. She was able to walking and text-slowly. She notes light still bothersome. She continues w/ HA, does note a slight improvement. When using the computer still increase symptoms. Per pt, during work she has not been able to take breaks as indicated by the FNP. She has tried using the visual breaks, decreasing the screen brightness, all have helped to a certain extent. She has not been able to complete daily tasks as prior to concussion, per pt completes tasks slowly. Some improvements, however does not feel back to her \"norm\"                          Vision Issues: light sensitivity, decreased saccades, ocular muscle fatigue/pain and abnormal ocular ROM   Other information/data: Has opthalmology appointment Aug 22.         OBJECTIVE:       *HEP-per pt, she has lost the handouts for the HEP. I gave new handouts/reviewed/practived. Noting slow eye movement and jumping around during practicing. Has not gotten glasses yet.    * Dynavision completed in Mode A/ 1 minute intervals.   Practice; 38 hits / 1.56 sec to locate.   1. 53 lights hit / 1.12 sec to locate  2. 52 lights hit / 1.15 sec to locate  3. 55 lights hit / 1.09 sec to locate  4. 51 lights hit / 1.16 sec to locate    Dynavision safe  norms indicate at 52+ lights hits. Less than 45 hits unsafe .  -completed in standing, " slight increase in light headedness/HA, managed with rest.       *Saccades worksheets-scanning for dble letter, reading numbers across. Completed x2 each, delayed scanning noted    Patients response to session tasks: fatigue, slow processng,       ASSESSMENT:  Pt making slow gains towards goals. Continues to demonstrate slow processing, delayed scanning and decreased saccades. Pt continues with headaches that decrease her involvement in daily tasks at home/work. No change to HEP, poor follow through this past week with exercises. Will cont OT/POC      Nest session treatment ideas: check in on HEP, practice HEP, dynavision, saccade sticks, post it notes, saccades work sheets.          Prognostic Indicators:  Rehabilitation potential: Good     Impairment: Ocular muscle fatigue/pain, light sensitivity, decreased saccades/ROM    Progress towards goals: Continue STG/LTG     Functional Goals:  to be met by discharge for ease of ADL/IADL and return to work.     1. Pt to verb/demo understanding of vision HEP for ease of daily tasks.-cont  2. Pt to verbalize understanding of concussion vision recovery process and education of vision/visual skills.-cont  3. Pt to report decrease ocular muscle fatigue/pain after HEP completion.-cont  4. Pt to verbalize understanding of environmental adaption's for vision during daily tasks/work for increased success.-cont  5. Pt to demo increased saccades vision by discharge for ease of daily tasks.-cont        Plan of Care:  Paitent/Family Instruction: Treatment plan/rationale, home exercise program, expected functional outcome., vision exercises, vision HEP program, vision activities     Frequency / Duration: 1 x/week for  7 weeks up to 7 visits    Signature: Bubba Obrien, OTRL/KWABENA 8/14/18      Physician Recommendation:  1. I certify the need for these services furnished within this plan and while under my care. I agree with the therapist's recommendation for plan of care.    2. If  there is any recommendation for modification of therapy plan, please indicate below.

## 2021-06-19 NOTE — PROGRESS NOTES
The patient was seen for a neuropsychological evaluation for the purposes of diagnostic clarification and treatment planning. 70 minutes of face-to-face testing were provided by this writer. The patient was cooperative with testing and no concerns were brought to my attention. Please see Dr. Arteaga s report for a detailed description of the charges and interpretation and integration of the findings.

## 2021-06-19 NOTE — LETTER
Letter by Cristin Villa FNP at      Author: Cristin Villa FNP Service: -- Author Type: --    Filed:  Date of Service:  Status: (Other)         March 27, 2019     Patient: Aishwarya Way   YOB: 1955   Date of Visit: 3/27/2019       To Whom It May Concern:    It is my medical opinion that Aishwarya Way may return to work on 3/27/19.  Employees recovering from concussions often exhibit cognitive symptoms that make attending work and learning difficult. They may not be able to attend work or only partial days. Some symptoms that could affect performance in the work environment  include: sensitivity to light and noise, headache, trouble focusing, concentrating, or remembering, and difficulty looking at a screen. The accommodations below often help reduce the symptoms and allow them to return to work quicker. Compliance with these accommodations allows the brain to recover more quickly even if it appears the employee is symptom free.     Attendance Restrictions  Full days as tolerated  Other Accommodations:  1. Allow patient to take a break if she starts to have symptoms, If symptoms continue or worsens please allow patient to return home.  2. Allow patient to take 10 minute breaks every 60 minutes   3. Please put a screen filter on the patient's computer   4. Allow employee to wear sunglasses and hat to help patient's sensitivity to lights  .  5. If patient wakes with severe headache please allow patient to start work later, it symptoms worsen patient should not attempt to work.  6. Excuse patient for all medical appointments        If you have any questions or concerns, please don't hesitate to call.    Sincerely,        Electronically signed by FELICIANO Pritchard

## 2021-06-19 NOTE — PROGRESS NOTES
"Physical Therapy  PHYSICAL THERAPY INITIAL CONCUSSION ASSESSMENT    Pt goes by Jocy    Date: 8/2/2018                        Date of Injury: 4/18/2018  Subjective: Pt fell forward when walking in her work's parking garage, striking her forehead and face as well as fracturing her L hand as well as straining her neck and knee as well.  Pt returned back to work on May 1st and started back to work on 4 hours and has steadily increased her hours up to 8 hours.  Eight hours was too much for the pt to handle and she is currently back up to 6 hrs on MWF and is off on T and TH.  Pt works for [employer] as a .  Pt lives with her  and 3 other family members.  [facility]'t [facility] since her injury.    Ongoing symptoms:  HAs (severe), nausea, memory issues, word finding, occ imbalance, vision changes    Headaches:  Constant.  Pt describes the HA location, on occasion, as \"localized\" and then \"all over\".  Pt describes HA as throbbing and burning at times. Exacerbating factors:  Talking, bright lights, sound.  Relieving factors:  Sleep, Tylenol   Current:  4/10, Best:  2/10 (best in AM), Worst:  \"over 10\"/10  Cervical:  Minimal to none, \"it's getting a lot better\". No main complaints  Dizziness:  Pt describes dizziness can be spinning but has difficulty explaining it.  She notes that it can come on out of nowhere.  Exacerbating factors:  Bending over (rare) but notes that it can just come.  Relieving factors:  Sitting and relaxing.  Balance:  Pt feels like her balance can be off just a little bit but notes she prefers to hold onto the HR with stairs.  Minimal complaints.    Pain rating: See above  Location: See above   Shoulder Clear? Pt demos B UE to ~130 degrees flexion.  Other information/data: See EMR    ROM: Cervical       [id number]: 60 deg, no pain       Extension: 45 deg, no pain        Right Rotation: 100% no pain       Left Rotation: 100%, c/o 3/10 on " R       Right Side Bendin deg, no pain    Left Side Bendin deg, c/o 3/10 on R         Cervical and Thoracic Segmental Mobility/Other :  NT; not indicated  Postural Assessment: Mild FHP but overall WNL    Vestibular/Balance  Gait:Mild path deviation and slightly reduced gait speed      Vertebral Basilar Artery: NT   Ocular AROM: Normal  Smooth Pursuit: Abnormal- Delayed with saccadic catch up, betzaida in R lower quadrant  Saccades: Abnormal- Undershooting; difficulty staying on target; delayed    Positional Tests: R Richmond-Hallpike NT due to time constraints                                              L Abi--Hallpike NT due to time constraints      Convergence: <1 cm (unsure if pt was following directions clearly though PT explained 2x; pt reports never seeing two pencils)  VOR Cancellation: Normal  Slow VOR: Normal  Right Head Impulse Test: Normal  Left Head Impulse Test: Normal but c/o brief dizziness and nausea     Sensory Organization Tests:  MCTSIB: NSEO Normal       PSEO Normal       NSEC Normal                  PSEC Abnormal- LOB at 18.7 sec, 23.3 sec    (Functional Gait Assessment) FGA: Not Tested due to time constraints         Initial Treatment: PT reviewed PT eval findings with the pt.  The pt's chief c/o are the HAs but they do not appear to have a musculoskeletal origin and her neck pain appears to be resolving and she demos good cervical AROM.  Pt does have some c/o balance and occ dizziness, though she is not the best historian when it comes to her dizziness.  Objective findings indicate possible hypofunction and ongoing assessment with goggles would be beneficial.  Plan to treat pt for balance with underlying cause likely a hypofunction.    Therapist Recommendations:  Impairments identified; See POC for goals and scheduled for subsequent visits      Rx Units OP eval  Total Minutes: 55

## 2021-06-19 NOTE — PROGRESS NOTES
"Physical Therapy  Physical Therapy Daily Outpatient Documentation  And DISCHARGE SUMMARY        Rx Units: 4NR    Total OP Minutes: 55    Treatment #: 1/8    Pain: 4-5/10  Location: HA  Intervention: None    Subjective: Pt reports suffering from a HA since yesterday that was initially \"over a 10/10\" but has reduced in intensity with medication.  Pt feels like she is cautious with her walking    Therapeutic Exercise  Total Minutes:       Neuro Re-Ed/Balance  Total Minutes: 55    Functional Gait Assessment:  Total Score: 24/30   Gait Level surface: 2, Mild, 6.72 sec   Change in Gait Speed: 2, Mild, no sig change in velocity   Gait with horizontal head turns: 2, Mild, slight change in gait velocity   Gait with vertical head turns:  2, Mild, slight change in gait velocity; c/o dizziness   Gait and Pivot Turn: 3, Normal   Step Over Obstacle: 3, Normal   Gait with Narrow Base of Support: 3, Normal   Gait with Eyes Closed: 2, Mild, 8.0 sec; deviates 6-10\" to the L   Ambulating Backwards: 2, Mild, slower speed   Steps: 3, Normal    With monocular infrared goggles placed, the following testing was completed:   -Assessment for gaze evoked nystagmus:  Negative bilaterally, no c/o symptoms   -Head shaking nystagmus:  Negative; no c/o symptoms    Patient educated on positional testing technique and purpose of recreating symptoms - patient consented. Vestibular semi-circular canal assessment with infrared goggle, fixation removed:   Left Abi Hallpike: Normal  Right Burden Hallpike: Normal  Left Supine Roll Test (HC): NT  Right Supine Roll Test (HC): NT  Patient educated on Normal findings.    MCTSIB:   -NSEO:  Normal   -NSEC:  Normal   -PSEO:  Normal    -PSEC:  Normal; occ mild post sway    -Time taken to explain the vestibular system and how it contributes to our balance and what a vestibular hypofunction and BPPV can do to change the system and what the person's affected response would be.  Pt verbalized understanding.      Gait " Training  Total Minutes:       Other:   Total Minutes:         DISCHARGE SUMMARY:  Pt returns for her initial subsequent Rx session after her evaluation completed on 8/2/2018.  The pt reports her balance has been better but notes it can be poor when she has an intense HA pain.  The pt was evaluated further this date to determine if there was a vestibular hypofunction or BPPV contributing to the pt's sense of imbalance.  All testing for BPPV and vestibular hypofunction performed with infrared goggles were negative this date.  Reassessment of the mCTSIB and assessment of the FGA has the pt meeting 100% of the goals outlined in the POC.  The pt's sense of occasional imbalance may be more pain related and have a vision component (pt is seeing OT for vision therapy) but a vestibular cause was ruled out.  Given the pt has met all of the goals and there is not an obvious vestibular component to the pt's symptoms, skilled 1:1 PT is no longer warranted and the pt is discharged from PT.  Pt verbalizes understanding and agrees with this plan.    Additional Notes:

## 2021-06-20 NOTE — PROGRESS NOTES
The patient was seen for a neuropsychological evaluation for the purposes of diagnostic clarification and treatment planning. 0.5 hours of face-to-face testing were provided by this writer. The patient was cooperative with testing. No concerns were brought to my attention. Please see Dr. Arteaga's report for a detailed description of the charges and interpretation and integration of the findings.

## 2021-06-20 NOTE — PROGRESS NOTES
Occupational Therapy    OCCUPATIONAL THERAPY TREATMENT SESSION      Rx Units: 3 ADL                                           Total Minutes: 55 minutes  Treatment #:   Insurance Carrier: [employer]  : 1955      Medical Diagnosis: Post Concussion Syndrome                                                                                                           Treatment Dx: Visual disturbance/changes that affect daily/work tasks.  Referring MD: FELICIANO Pritchard  Onset date: 18                 Start of Care: 18    Pain ratin/10  Location: headache       SUBJECTIVE:    Pt arrived on time and appropriately dressed for the weather. She continues to note poor sleep, with waking from headaches. Continues to use hot/cold packs to manage headaches. She also notes continues headaches happening daily but the intensity is variable. She has been tolerating working, x3 days/ 6 hours. She reports trying 7 hours, but was not able to tolerate. She is back at 6 hours and then will gradually increase over time. She plans on getting back into her exercise routine while on vacation. When she returns she hopes to return to her gym to start doing a little exerc. HEP;only exerc completing still is drawing objects, she notes these to be going well- no longer doing saccades exercise. Cont with light sensitivity, has tinted glasses on. Cont with noise sensitivty. Doing more tasks at home on own. Seems to be going well.                        Vision Issues:  light sensitivity, ocular ROM         OBJECTIVE:       *Ocular ROM; Demo ROM through all planes. Mild jumping/skipping ahead and inconsistent through lower right quadrant.   -no c/o ocular discomfort    OCualr exerc/activity:  Words search; x2 completd. Needing assist to find 1/12 words. No report of ocular faitgue/strain.     ipad gaems; [employer]!- completed x5 sets, level 3-4, needing extra time to complete (processing). No fatigue around eyes. HA pain  only.     HEP:  *tapering handout reviewed and given to patient. Verb understanding.     Patients response to session tasks: tolerated fair. HA barrier/but no changein intensity during session.       ASSESSMENT:  Pt doing well with all visual symptoms. No further symptoms specific related to vision from concussion. She has met all her goals, to potential. She does have the tools to continue her recovery on own. Light sensitivity and headaches remain. I do feel that the light sensitivity is related to headaches and anticipate it to fade as headaches improve. She continues to use all the suggestions for light sensitivity and verb understanding of their use. No further direct OT outpatient indicated at this time.       Prognostic Indicators:  Rehabilitation potential: Good      Impairment: Ocular muscle fatigue/pain, light sensitivity, decreased saccades/ROM      Progress towards goals: Continue STG/LTG      Functional Goals:  to be met by discharge for ease of ADL/IADL and return to work.      1. Pt to verb/demo understanding of vision HEP for ease of daily tasks.-met  2. Pt to verbalize understanding of concussion vision recovery process and education of vision/visual skills.-met to potential  3. Pt to report decrease ocular muscle fatigue/pain after HEP completion.- met  4. Pt to verbalize understanding of environmental adaption's for vision during daily tasks/work for increased success.-met  5. Pt to demo increased saccades vision by discharge for ease of daily tasks.-met          Plan of Care:  Paitent/Family Instruction: D/c rationale, HEP tapering handout.     Frequency / Duration: d/c OT    Signature: Bubba Obrien, OTRL/CBGAVIN 9/20/18      Physician Recommendation:  1. I certify the need for these services furnished within this plan and while under my care. I agree with the therapist's recommendation for plan of care.    2. If there is any recommendation for modification of therapy plan, please indicate  below.

## 2021-06-20 NOTE — PROGRESS NOTES
Pt being seen for concussion f/u apt.  Pt states things are a little better than last visit. Still experiencing HA's and light sensitivity.

## 2021-06-20 NOTE — PROGRESS NOTES
How have you been doing since we last saw you? any concerns? Pt states she is still having headache. Does not sleep well at times.    Current Symptoms : Yes Headache

## 2021-06-20 NOTE — PROGRESS NOTES
"Assessment:     1. Concussion, with loss of consciousness for minutes  2. Post concussion syndrome  3. Dizziness  4. Headaches    Plan:       Pain control for headaches - Tylenol only due to rebound headaches, Brittany/blue tinted glasses  Sleeping Problems-monitor, sleep hygiene   Anxiety due to concussion - monitor  Decreased concentration and focus - monitor  Return to Work/School- she is taking a week of PTO, then will work from home    Patient returns concussion clinic for follow-up appointment, she was last seen by me on 8/23/18, were no medication changes were made.  Patient reports that the breaks are helping.  She did have a meeting with  and I gave her permission to work from home.  Patient reports that her headaches are still continuous.  She reports her eyes are \"getting better\".  She was told by the manager that she did not want her working from home.  The patient reports a cognitively she is having difficulties with multitasking, she endorses brain fogginess, brain slowing, difficulty with concentration and focus and remembering.  Emotionally she reports that she has had increased stress, she is having her  drive her to work and this is hard for her.  She reports that she does not feel she is able to drive due to her symptoms.  The patient is taking a week of PTO and then I do believe she will be working from home, see letter.  Emotionally the patient reports that her symptoms are increasing her irritability and anxiety.  She reports his sleeping is getting slightly better but she still has problems.  Patient reports no improvement in her physical, cognitive, or emotional concussive symptoms.  I am hoping that working from home will help the patient recover more quickly.    Subjective:          She is a 63 y.o. female who initially presented to the concussion clinic on 7/25/8 with a blunt/closed head injury which she sustained while at work on 4/18/18. She slipped in the parking garage and fell " forward hitting her head on the concrete. The point of impact was face. Since the injury, she has had a headache. She does not have retrograde and anterograde amnesia. She has had 0 previous head injuries. First symptoms were immediately where she HA and confusion. With regard to cognitive symptoms, she endorsed significant ongoing concerns with her memory and that she has difficulties with attention and concentration as well as slowed thinking. In terms of emotional symptoms, she noted that in general she feels more emotional. She did acknowledge becoming more easily irritated and frustrated, she also reports feeling more sadness and nervousness. Finally, with regard to physical symptoms, she endorsed significant ongoing headaches noting that she has headaches contiguously. She indicated that approximately every other day these headaches are particularly bad such that she would rate them as a 10 on a 1-10 rating scale. On average she would describe her headaches as being at about a 3/10. At her best her headaches are a 3/10. The patient reports she is not sure what makes her symptoms worse, and rest makes her symptoms better. She indicated that she takes acetaminophen (Tylenol) and ibuprofen (Advil) and it takes the edge off, but not always. She stated also that she experiences  nausea without vomiting, balance problems (no additional falls) dizziness, worsen eye site, as well as blurred vision, fatigue, sensitivity to light and noise. She also described sleep disturbance. She experiences drowsiness, is sleeping more than usual, does not have difficulty falling asleep and when she wakes she does not feel rested.  8/23/18  The patient reports that her headaches are little bit better, they are less severe.  She reports that on 8/13 she did have a pretty bad headache.  The patient reports that she continues to have nausea with her headaches.  She reports that her balance problems and dizziness are much better, she was  "discharged by physical therapy.  The patient reports that her visual problems have also improved, she does have a new prescription for her glasses.  The patient reports that fatigue is better.  Patient reports that her sensitivity light and noise has not improved.  Patient reports that she tried driving and had a severe headache.  She finds it very difficult to get rides to her work, does not feel confident in her ability to drive.  Will attempt to get the patient able to work from home, so we do not have severe headaches by the time we get to her office.  The patient reports that cognitively she continues to have a problem, she has a lot of problems with word finding issues.  She also reports that she continues to have mental fogginess, brain slowing, difficulty concentrating, focusing and remembering.  Emotionally she feels she continues to have irritability, sadness, feelings of more motion, and increased anxiety.  She reports that her work has made her feel like she is going to lose her job, which has increased her anxiety.  She does feel that with a QR C she is more control and reports that this will hopefully help with her anxiety over the concussion.  The patient reports that on Friday she did feel \"stronger\".  Patient reports that she is sleeping better and waking feeling rested.  Overall the patient reports improvement slightly in physical, and emotional symptoms.  Patient will work with QR C try to accommodate patient to be able to work from home.  We will slowly increase hours as patient tolerates.      There are no active problems to display for this patient.    No past medical history on file.  No past surgical history on file.  No family history on file.  Current Outpatient Prescriptions   Medication Sig Dispense Refill     aspirin 325 MG tablet Take 325 mg by mouth daily.       atorvastatin (LIPITOR) 20 MG tablet Take 20 mg by mouth bedtime.       CALCIUM-MAGNESIUM-ZINC ORAL Take by mouth.       " CARTILAGE/COLLAGEN/BOR/HYALUR (JOINT HEALTH ORAL) Take by mouth.       hydroCHLOROthiazide (HYDRODIURIL) 12.5 MG tablet Take 12.5 mg by mouth daily.       ibuprofen (ADVIL,MOTRIN) 200 MG tablet Take 600 mg by mouth every 6 (six) hours as needed for pain.       KRILL OIL ORAL Take by mouth.       losartan (COZAAR) 100 MG tablet Take 100 mg by mouth daily.       metFORMIN (GLUCOPHAGE) 500 MG tablet Take 500 mg by mouth 2 (two) times a day with meals.       minocycline (MINOCIN,DYNACIN) 50 MG capsule Take 50 mg by mouth as needed.       TURMERIC, BULK, MISC Use As Directed.       No current facility-administered medications for this encounter.        No Known Allergies  Social History     Social History     Marital status:      Spouse name: N/A     Number of children: N/A     Years of education: N/A     Occupational History     Not on file.     Social History Main Topics     Smoking status: Not on file     Smokeless tobacco: Not on file     Alcohol use Not on file     Drug use: Not on file     Sexual activity: Not on file     Other Topics Concern     Not on file     Social History Narrative       The following portions of the patient's history were reviewed and updated as appropriate: allergies, current medications, past family history, past medical history, past social history, past surgical history and problem list.  Review of Systems  A comprehensive review of systems was negative except for:what is noted above    Objective:       Vitals:    09/20/18 1007   BP: 133/62   Pulse: 79       Imaging:  CT Head: Reviewed    Discussion was held with the patient today regarding concussion in general including types of injury, symptoms that are common, treatment and variability in time to recover. I also provided written information about concussion and symptoms that would apply to her in her concussion folder. Education about concussion symptoms and length of time it would take the patient to recover was also given to  the patient.  I have reassured the patient her symptoms are very common when a concussion is present and will improve with time. We discussed the risks and benefits of the medication including risk of worsening depression with medication adjustments and even the possibility of emergence of suicidality      Total time spent with the patient today was 30 minutes with greater than 50% of the time spent in counseling and care coordination. The patient will return in about 4 weeks to this clinic for further assessment and treatment. She agrees to call before then with any questions, concerns or problems. We will assess for the appropriateness of possible psychotropic medication trials/changes. The patient will seek out appropriate emergency services should that become necessary.I will be available if any questions, concerns, or problems that arise.       Mental Status Examination  Patient is casually dressed and seated for evaluation. She is cooperative with questioning and eye contact is good. She is fully engaged in conversation today. She is alert and fully oriented. Speech is normal. Thought processes normal with normal prehension and expression. Thoughts are organized and linear. Content is pertinent to the conversation and without evidence of auditory or visual hallucinations. No delusional ideation. Affect/mood is euthymic-bright, even. Gen. fund of knowledge, insight and memory are normal

## 2021-06-20 NOTE — PROGRESS NOTES
Occupational Therapy    OCCUPATIONAL THERAPY TREATMENT SESSION      Rx Units: 4 ADL                                           Total Minutes: 55 minutes  Treatment #: 3/7  Insurance Carrier: [employer]  : 1955      Medical Diagnosis: Post Concussion Syndrome                                                                                                           Treatment Dx: Visual disturbance/changes that affect daily/work tasks.  Referring MD: FELICIANO Pritchard  Onset date: 18                 Start of Care: 18    Pain ratin-7/10  Location: headache/ocular discomfort. Did take medicine prior to OT session       SUBJECTIVE:    Pt arrived on time. Per pt, HEP are easy and going well. She is starting next week to increase number of hours at work, as tolerated. She does continue to feel she is slow at completing work tasks. Continues to wear shaded prescription glasses with bifocals. Per pt, headaches continue but more sporadically. She has not returned to driving as of yet. She has been able to co mpelte most tasks at home now without much difficulty. She does note dizziness when moving to fast or changing positions.                           Vision Issues: light sensitivity, decreased saccades/ocular ROM      OBJECTIVE:    Reassess saccades/ROM:   *saccades; no undershooting noted through midline to R/L or across midline. Completed without glasses   *Ocular ROM; Demo ROM through all planes. Mild jumping/skipping ahead and inconsistent through lower right quadrant.   -no c/o ocular discomfort    Saccades exerc/activity:   *Dynavision completed in Mode A/ 1 minute intervals.   Practice; 60 hits / 1.0 sec to locate.   1. 62 lights hit / .95 sec to locate  2. 62 lights hit / .92 sec to locate  3. 64 lights hit / .94 sec to locate  4. 59 lights hit / 1.01 sec to locate    Dynavision safe  norms indicate at 52+ lights hits. Less than 45 hits unsafe .  -completed in standing  -no  change in headache/ocular strain.   -completed with glasses on    *marble race; completed x4 sets clockwise/counter clockwise, x10 reps. Short breaks between each set. No change in headache. Mild ocular fatigue    *Flashlight trail; completed x4 sets of 20 trails. VC to only move eyes not head with scanning trails.       Patients response to session tasks: mild fatigue, no change in headache, mild ocular strain faded with rest.       ASSESSMENT:  Pt making gains towards goals. Has been able to return to several home tasks with little increase in symptoms. Continues to have difficulty light sensitivity. Was able to complete several more tasks this session with less rest breaks and/or c/o of symptoms. Discussed with pt improvement and next session being last OT session. Pt did agree to plan.       Nest session treatment ideas: handotu for fading HEP, saccade sticks, post it notes, saccades work sheets, ipad           Prognostic Indicators:  Rehabilitation potential: Good      Impairment: Ocular muscle fatigue/pain, light sensitivity, decreased saccades/ROM      Progress towards goals: Continue STG/LTG      Functional Goals:  to be met by discharge for ease of ADL/IADL and return to work.      1. Pt to verb/demo understanding of vision HEP for ease of daily tasks.-partially met  2. Pt to verbalize understanding of concussion vision recovery process and education of vision/visual skills.-partially met  3. Pt to report decrease ocular muscle fatigue/pain after HEP completion.-partially met  4. Pt to verbalize understanding of environmental adaption's for vision during daily tasks/work for increased success.-met  5. Pt to demo increased saccades vision by discharge for ease of daily tasks.-met          Plan of Care:  Paitent/Family Instruction: Treatment plan/rationale, home exercise program, expected functional outcome., vision exercises, vision HEP program, vision activities      Frequency / Duration: 1 x/week for   7 weeks up to 7 visits    Signature: Bubba Obrien, OTRL/KWABENA 8/28/18      Physician Recommendation:  1. I certify the need for these services furnished within this plan and while under my care. I agree with the therapist's recommendation for plan of care.    2. If there is any recommendation for modification of therapy plan, please indicate below.

## 2021-06-20 NOTE — PROGRESS NOTES
BRIEF NEUROPSYCHOLOGICAL CONSULTATION    NAME: Aishwarya Way  YOB: 1955     DATE OF EVALUATION: 8/28/2018    REASON FOR REFERRAL:  Ms. Aishwarya Way is a 63 y.o., right-handed, [place] female who presents to the [facility] for further evaluation and management of a concussion injury she sustained on 4/18/2018.  She was referred for neuropsychological consultation by Cristin Villa CNP to provide information regarding cognitive and emotional functioning following her mild brain injury. Ms. Way was originally seen by me on 8/9/2018 and the reader is referred to my documentation from that date for a detailed review of her history and the details of her current injury.      In brief, at the time of my initial evaluation, she exhibited variable attention/concentration and processing speed abilities, which likely contributed to impairments in verbal learning efficiency and variable verbal memory. With regard to emotional functioning, the patient denied significant symptoms of depression or anxiety on self-report measures but acknowledged numerous stressors and became tearful throughout the clinical interview. Therefore, it appeared that there was an element of situational depression that the patient was experiencing but may have been hesitant to fully acknowledge. Overall, etiology of the cognitive deficits was thought to be due to a combination of physical symptoms/discomfort (e.g., headaches, fatigue), significant stress, and possibly residual sequelae from her relatively recent concussion. I recommended a re-evaluation of her cognitive functioning in order to monitor her recovery, clarify etiology, and update recommendations.    Verbal consent for neuropsychological testing was received following the provision of information about the nature and purpose of the evaluation, and the opportunity to ask questions.     INTERIM HISTORY OF PRESENT ILLNESS:  With regard to  "her cognitive symptoms, the patient reported that she feels mentally clearer but continues to have a degree of word-finding difficulty, forgetfulness, and issues with multitasking. Emotionally, the patient reported feeling \"stronger,\" but continues to experience significant stress with regard to work and her workability. She is currently working 6-hour shifts three days a week (Monday, Wednesday, and Friday). She is able to tolerate this shift most days, but continues to get rather severe headaches on occasion. Her headaches get particularly bad when she attempts to drive herself to work. She used to rely on her  and daughter to drive her; however, they have been unable to recently. The patient recently requested to work from home (like most of her coworkers); however, her boss allegedly stated that she is unsure if she can accommodate that. This was very upsetting to the patient, who feels that she is being \"punished\" by her boss. The patient continues to be eager to work, and feels that she can do her job better and for a longer period of time if she works from home (and avoids driving, which exacerbates her symptoms). She continues to work with a [employer], who has been very helpful. With regard to sleep, the patient reported occasional problems sleeping during the night (twice last week she only slept for a couple of hours); however, she generally sleeps just fine. On the nights she was unable to sleep, she denied feeling anxious or emotionally upset; rather, she stated that her head was throbbing. She and her  eliminated all lights in the room to try to help but she continued to have difficulty sleeping on those two nights. Current substance abuse was denied.    The patient completed a subsection of the Sports Concussion Assessment Tool-3 (SCAT-3) that assesses post-concussive symptoms. Out of 22 possible symptoms, the patient endorsed 17 of them. Her reported symptom severity score was 51 out of 132, " suggesting that the symptoms she experiences are generally mild to moderate in intensity. Specifically, she endorsed mild symptoms of neck pain, blurred vision, nausea, dizziness, balance difficulites, fatigue, reduced energy, confusion, irritability, sadness and feeling more emotional.  Moderate symptoms of not feeling right, attention/concentration problems, memory problems and mental fogginess were also endorsed. Lastly, severe symptoms of headache, pressure in head, phonosensitivity and photosensitivity were reported.    PAST MEDICAL HISTORY:  According to medical records, the patient has diabetes mellitus-type 2, obesity, osteoarthritis, and hyperlipidemia. She has no prior history of head injury.    No past surgical history on file.    FAMILY MEDICAL HISTORY:  No family history on file.    DIAGNOSTIC STUDIES:  A recent head CT (4/18/2018) was reportedly negative for acute intracranial pathology.     CURRENT MEDICATIONS:  Current Outpatient Prescriptions:      aspirin 325 MG tablet, Take 325 mg by mouth daily., Disp: , Rfl:      atorvastatin (LIPITOR) 20 MG tablet, Take 20 mg by mouth bedtime., Disp: , Rfl:      CALCIUM-MAGNESIUM-ZINC ORAL, Take by mouth., Disp: , Rfl:      CARTILAGE/COLLAGEN/BOR/HYALUR (JOINT HEALTH ORAL), Take by mouth., Disp: , Rfl:      hydroCHLOROthiazide (HYDRODIURIL) 12.5 MG tablet, Take 12.5 mg by mouth daily., Disp: , Rfl:      ibuprofen (ADVIL,MOTRIN) 200 MG tablet, Take 600 mg by mouth every 6 (six) hours as needed for pain., Disp: , Rfl:      KRILL OIL ORAL, Take by mouth., Disp: , Rfl:      losartan (COZAAR) 100 MG tablet, Take 100 mg by mouth daily., Disp: , Rfl:      metFORMIN (GLUCOPHAGE) 500 MG tablet, Take 500 mg by mouth 2 (two) times a day with meals., Disp: , Rfl:      minocycline (MINOCIN,DYNACIN) 50 MG capsule, Take 50 mg by mouth as needed., Disp: , Rfl:      TURMERIC, BULK, MISC, Use As Directed., Disp: , Rfl:     PSYCHIATRIC HISTORY:  Ms. Way denies any history  of psychiatric diagnosis, treatment, or hospitalization.    SUBSTANCE USE HISTORY:  Ms. Way denies any history of chemical dependency diagnosis, treatment, or hospitalization. She was a never smoker.    RELEVANT SOCIAL HISTORY:  Ms. Way was born and raised in [place] and [place] the [place] in [place]. 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 She earned mostly above average grades. Significant learning difficulties or developmental attention issues were denied. 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 She currently works full-time as a  at [employer]. Her duties require her to work at a computer, participate in video-conference meetings, write emails, and perform web searches. She has been  for 35 years, and they have 3 children together. The patient and her  live together in their own home in [place], [place], along with their daughter and two grandchildren.    SERVICES & TESTS ADMINISTERED:  For diagnostic and coding purposes, Ms. Way has a history of mild traumatic brain injury and was referred for an evaluation of mild neurocognitive disorder. Today s evaluation consisted of 3 hours of professional neuropsychologist time, including 1 hour of time spent in neurobehavioral status exam (78597); 1 hours spent in medical record review, administration of WRAT-4 Word Reading, interpretation and integration of all tests, report preparation, and provision of education and feedback at the completion of today's appointment (87874); and 1 hour of time spent in the administration of the remainder of the testing battery by a trained examiner and interpreted by the neuropsychologist.    The test battery included Repeatable Battery for the Assessment of Neuropsychological Status-Update, Trailmaking Test, and a subsection of the SCAT-3 post-concussive symptom questionnaire.    MENTAL  STATUS EXAM AND BEHAVIORAL OBSERVATIONS:  Ms. Way arrived on time and unaccompanied to today's appointment. She was appropriately dressed and groomed. She appeared alert and engaged. Gait and other motor activity appeared normal. No vision or hearing difficulties were observed. Conversational speech was of normal volume and prosody; rate of speech was was slowed. No word-finding pauses or paraphasias were noted. Her thought process appeared linear and goal-directed. No hallucinations or delusions were apparent. Judgment and insight appeared intact. Her mood was dysthymic and her affect was appropriately reactive. She became tearful when discussing her mood and stressors. Rapport was easily established and eye contact was unremarkable. She was pleasant and cooperative throughout the evaluation. She understood test instructions without difficulty. Ms. Way appeared adequately motivated and engaged easily during testing. Therefore, the following results are considered a valid estimation of her current cognitive abilities.    ESTIMATED OPTIMAL PREMORBID FUNCTIONING:  Optimal premorbid intellectual abilities were estimated as falling in the average range based on Ms. Way's educational and occupational histories and performance on a task least likely to be affected by acquired brain dysfunction.    TEST RESULTS:  Ms. Way appeared fully oriented. Auditory attention and working memory performances fell in the average range of functioning. Specifically, she was able to immediately recall up to 6 digits in forward order (average), up to 4 digits in backwards order (average), and up to 6 digits in sequence (average).    Comprehension appeared fully intact.  Confrontation naming was impaired, possibly due in part to cultural factors. Her performance on a measure of semantic verbal fluency fell in the low average range of functioning overall.     Cognitive speed and processing accuracy fell in the low  average range of functioning on a task that required her to use numbers to rapidly decode a series of abstract symbols.  Her performance fell in the average range on a task that required her to quickly connect a series of numbers presented in a visual array on a page. Her performance was in the borderline impaired range on a subsequent task that required mental flexibility and set-shifting to quickly alternate between sequencing letters and numbers presented on a page.    Visual attention and spatial localization skills were low average.  Two dimensional visuoconstruction of a geometric design was in the superior range relative to peers.     With regard to learning and memory, Ms. Way was also administered measure of rote auditory verbal list learning that required her to learn a series of 10 words over  trials and retain and recall them over a long (20 minute) delay.  Her initial rate of learning fell in the high average range of functioning (raw score recall over trials = 4, 8, 10, 10).  Ms. Way retained and recalled approximately 90% of the previously learned information over the long delay (above average performance).  Recognition memory was average.  Contextual auditory verbal learning abilities were low average and she retained and recalled over 100% of the previously learned information over a delay (average).  Her recall of a geometric design was in the superior range, with a 90% retention rate.    DIAGNOSTIC IMPRESSIONS & RECOMMENDATIONS:  Ms. Aishwarya Way is a 63 y.o., right-handed, [place] female, with a history of recent mild traumatic brain injury who was referred for a brief neuropsychological screening evaluation by Cristin Villa CNP to assist with treatment planning.     An abbreviated neurocognitive evaluation was conducted and she was an engaged and cooperative participant. Optimal premorbid abilities were estimated as falling in the average range of functioning and today's  results are generally commensurate with that estimate.  Specifically, her performances are considered to be within normal limits across all domains assessed. She retains a relative weakness in processing speed, with performances in the low average range. At the present time, there is no compelling evidence of objective cognitive dysfunction. With regard to emotional functioning, the patient continues to endorse significant and ongoing stressors, primarily related to feeling unsupported and misunderstood at work with regard to her symptoms and recovery from this mild brain injury.     Compared to prior testing three weeks ago, nearly all of her cognitive performances have significantly improved. This includes her performances on measures of attention/concentration, processing speed, learning/memory, visuospatial/constructional skills, and language processing. All other cognitive test scores that were intact last time remained stable, aside from her performance on a mental flexibility task. Interestingly, she was able to perform this task without issue three weeks ago, but today she appeared to have difficulty keeping track of the order of the alphabet. This is of uncertain clinical significance. Overall, Ms. Way appears to be making a good recovery from her mild traumatic brain injury.     At the end of the session, I met with the patient to discuss her experience with testing, review the results, provide feedback and education, and discuss my recommendations moving forward.    1) Overall, Ms. Way can be reassured that she is making a good recovery from her mild traumatic brain injury, as demonstrated by her performance on this brief neurocognitive screening evaluation.     2) Education was again provided on the impact of emotional distress and how that can cause or exacerbate other physical symptoms (e.g., headaches, fatigue, etc.). I encouraged the patient to reconsider establishing care with our  psychotherapist and discussed the importance of treating her mood symptoms to help optimize her recovery. The patient declined any mental health services at this time, citing her spirituality and family as current supports. She stated that she will contact the clinic if she changes her mind.    3) Education was again provided about the fact that some activities may temporarily flare-up her symptoms, but that it is important for her to re-engage in them slowly and steadily so that she may be able to build up her tolerance for normal activities. The one exception is driving, which she should not do if she is experiencing significant physical symptoms/fatigue.    4) We reviewed various compensatory and organizational strategies which could help to optimize her functioning in daily life (e.g., utilizing note pads, checklists, to-do lists, calendars/planners, alarm reminders, a pillbox, etc.).    At the present time, it does not appear that any further follow-up with this neuropsychologist is needed.  However, if Ms. Way should experience any cognitive difficulties or an exacerbation of her functional difficulties in daily life, she is encouraged to contact this clinic again.  Today's results will serve as a baseline for future comparison.    Thank you for the opportunity to assist in the evaluation and care of Ms. Way. Please do not hesitate to contact me with any questions regarding my findings or recommendations.      Sara Arteaga PsyD, LP  Licensed Clinical Neuropsychologist  [place] [id number]  Phone: [phone]

## 2021-06-20 NOTE — PROGRESS NOTES
Occupational Therapy Concussion-Vision Discharge      Start of Care:   Date of Discharge: 18  Number (#) of sessions attended:   Insurance Carrier: Work comp  : 1955    Refer to daily documentation flowsheet for task/exercise completion and progress    See initial evaulation for goals and POC    Functional Goals:  to be met by discharge for ease of ADL/IADL and return to work.      1. Pt to verb/demo understanding of vision HEP for ease of daily tasks.-met  2. Pt to verbalize understanding of concussion vision recovery process and education of vision/visual skills.-met to potential  3. Pt to report decrease ocular muscle fatigue/pain after HEP completion.- met  4. Pt to verbalize understanding of environmental adaption's for vision during daily tasks/work for increased success.-met  5. Pt to demo increased saccades vision by discharge for ease of daily tasks.-met      D/C Summary:  Pt doing well with all visual symptoms. No further symptoms specific related to vision from concussion. She has met all her goals, to potential. She does have the tools to continue her recovery on own. Light sensitivity and headaches remain. I do feel that the light sensitivity is related to headaches and anticipate it to fade as headaches improve. She continues to use all the suggestions for light sensitivity and verb understanding of their use. No further direct OT outpatient indicated at this time.     Bubba Obrien, OTRL/CBIS 18

## 2021-06-20 NOTE — PROGRESS NOTES
Assessment:     1. Concussion, with loss of consciousness for minutes  2. Post concussion syndrome  3. Dizziness  4. Headaches    Plan:       Pain control for headaches - Tylenol only due to rebound headaches, Brittany/blue tinted glasses  Sleeping Problems-monitor, sleep hygiene   Anxiety due to concussion - monitor  Decreased concentration and focus - monitor  Return to Work/School- she returned to work 5/1/18 for 4-6 hours 5 days a week until 8/24/18    Patient returns concussion clinic for follow-up appointment, she was last seen by me on 7/25/18, were no medication changes were made.  The patient reports that her headaches are little bit better, they are less severe.  She reports that on 8/13 she did have a pretty bad headache.  The patient reports that she continues to have nausea with her headaches.  She reports that her balance problems and dizziness are much better, she was discharged by physical therapy.  The patient reports that her visual problems have also improved, she does have a new prescription for her glasses.  The patient reports that fatigue is better.  Patient reports that her sensitivity light and noise has not improved.  Patient reports that she tried driving and had a severe headache.  She finds it very difficult to get rides to her work, does not feel confident in her ability to drive.  Will attempt to get the patient able to work from home, so we do not have severe headaches by the time we get to her office.  The patient reports that cognitively she continues to have a problem, she has a lot of problems with word finding issues.  She also reports that she continues to have mental fogginess, brain slowing, difficulty concentrating, focusing and remembering.  Emotionally she feels she continues to have irritability, sadness, feelings of more motion, and increased anxiety.  She reports that her work has made her feel like she is going to lose her job, which has increased her anxiety.  She does  "feel that with a QR C she is more control and reports that this will hopefully help with her anxiety over the concussion.  The patient reports that on Friday she did feel \"stronger\".  Patient reports that she is sleeping better and waking feeling rested.  Overall the patient reports improvement slightly in physical, and emotional symptoms.  Patient will work with [employer] C try to accommodate patient to be able to work from home.  We will slowly increase hours as patient tolerates.    Subjective:          She is a 63 y.o. female who initially presented to the concussion clinic on 7/25/8 with a blunt/closed head injury which she sustained while at work on 4/18/18. She slipped in the parking garage and fell forward hitting her head on the concrete. The point of impact was face. Since the injury, she has had a headache. She does not have retrograde and anterograde amnesia. She has had 0 previous head injuries. First symptoms were immediately where she HA and confusion. With regard to cognitive symptoms, she endorsed significant ongoing concerns with her memory and that she has difficulties with attention and concentration as well as slowed thinking. In terms of emotional symptoms, she noted that in general she feels more emotional. She did acknowledge becoming more easily irritated and frustrated, she also reports feeling more sadness and nervousness. Finally, with regard to physical symptoms, she endorsed significant ongoing headaches noting that she has headaches contiguously. She indicated that approximately every other day these headaches are particularly bad such that she would rate them as a 10 on a 1-10 rating scale. On average she would describe her headaches as being at about a 3/10. At her best her headaches are a 3/10. The patient reports she is not sure what makes her symptoms worse, and rest makes her symptoms better. She indicated that she takes acetaminophen (Tylenol) and ibuprofen (Advil) and it takes the " edge off, but not always. She stated also that she experiences  nausea without vomiting, balance problems (no additional falls) dizziness, worsen eye site, as well as blurred vision, fatigue, sensitivity to light and noise. She also described sleep disturbance. She experiences drowsiness, is sleeping more than usual, does not have difficulty falling asleep and when she wakes she does not feel rested.      There are no active problems to display for this patient.    No past medical history on file.  No past surgical history on file.  No family history on file.  Current Outpatient Prescriptions   Medication Sig Dispense Refill     aspirin 325 MG tablet Take 325 mg by mouth daily.       atorvastatin (LIPITOR) 20 MG tablet Take 20 mg by mouth bedtime.       CALCIUM-MAGNESIUM-ZINC ORAL Take by mouth.       CARTILAGE/COLLAGEN/BOR/HYALUR (JOINT HEALTH ORAL) Take by mouth.       hydroCHLOROthiazide (HYDRODIURIL) 12.5 MG tablet Take 12.5 mg by mouth daily.       ibuprofen (ADVIL,MOTRIN) 200 MG tablet Take 600 mg by mouth every 6 (six) hours as needed for pain.       KRILL OIL ORAL Take by mouth.       losartan (COZAAR) 100 MG tablet Take 100 mg by mouth daily.       metFORMIN (GLUCOPHAGE) 500 MG tablet Take 500 mg by mouth 2 (two) times a day with meals.       minocycline (MINOCIN,DYNACIN) 50 MG capsule Take 50 mg by mouth as needed.       TURMERIC, BULK, MISC Use As Directed.       No current facility-administered medications for this encounter.        No Known Allergies  Social History     Social History     Marital status:      Spouse name: N/A     Number of children: N/A     Years of education: N/A     Occupational History     Not on file.     Social History Main Topics     Smoking status: Not on file     Smokeless tobacco: Not on file     Alcohol use Not on file     Drug use: Not on file     Sexual activity: Not on file     Other Topics Concern     Not on file     Social History Narrative       The following  portions of the patient's history were reviewed and updated as appropriate: allergies, current medications, past family history, past medical history, past social history, past surgical history and problem list.  Review of Systems  A comprehensive review of systems was negative except for:what is noted above    Objective:       There were no vitals filed for this visit.    Imaging:  CT Head: Reviewed    Discussion was held with the patient today regarding concussion in general including types of injury, symptoms that are common, treatment and variability in time to recover. I also provided written information about concussion and symptoms that would apply to her in her concussion folder. Education about concussion symptoms and length of time it would take the patient to recover was also given to the patient.  I have reassured the patient her symptoms are very common when a concussion is present and will improve with time. We discussed the risks and benefits of the medication including risk of worsening depression with medication adjustments and even the possibility of emergence of suicidality      Total time spent with the patient today was 30 minutes with greater than 50% of the time spent in counseling and care coordination. The patient will return in about 4 weeks to this clinic for further assessment and treatment. She agrees to call before then with any questions, concerns or problems. We will assess for the appropriateness of possible psychotropic medication trials/changes. The patient will seek out appropriate emergency services should that become necessary.I will be available if any questions, concerns, or problems that arise.       Mental Status Examination  Patient is casually dressed and seated for evaluation. She is cooperative with questioning and eye contact is good. She is fully engaged in conversation today. She is alert and fully oriented. Speech is normal. Thought processes normal with normal  prehension and expression. Thoughts are organized and linear. Content is pertinent to the conversation and without evidence of auditory or visual hallucinations. No delusional ideation. Affect/mood is euthymic-bright, even. Gen. fund of knowledge, insight and memory are normal.

## 2021-06-21 NOTE — PROGRESS NOTES
Assessment:     1. Concussion, with loss of consciousness for minutes  2. Post concussion syndrome  3. Dizziness  4. Headaches    Plan:       Pain control for headaches - Tylenol only due to rebound headaches, Brittany/blue tinted glasses  Sleeping Problems-monitor, sleep hygiene   Anxiety due to concussion - monitor  Decreased concentration and focus - monitor  Return to Work/School- she is taking a week of PTO, then will work from home    Patient returns concussion clinic for follow-up appointment, she was last seen by me on 9/20/18, were no medication changes were made.  Patient reports no real change in her symptoms.  She continues to endorse headaches, nausea, fatigue, dizziness, sensitivity light and noise, and sunrise.  Emotionally she continues to report increased irritability, feeling more emotional, and increased anxiety and sadness.  Cognitively she reports she continues to have mental fogginess, brain slowing, difficulty concentrating and remembering.  Overall the patient reports there is been very minimal reduction in headaches.  She has not yet started working from home, she reports this was happening next week.  Hopefully when patient starts working from home her symptoms will reduce.    Subjective:          She is a 63 y.o. female who initially presented to the concussion clinic on 7/25/8 with a blunt/closed head injury which she sustained while at work on 4/18/18. She slipped in the parking garage and fell forward hitting her head on the concrete. The point of impact was face. Since the injury, she has had a headache. She does not have retrograde and anterograde amnesia. She has had 0 previous head injuries. First symptoms were immediately where she HA and confusion. With regard to cognitive symptoms, she endorsed significant ongoing concerns with her memory and that she has difficulties with attention and concentration as well as slowed thinking. In terms of emotional symptoms, she noted that in  general she feels more emotional. She did acknowledge becoming more easily irritated and frustrated, she also reports feeling more sadness and nervousness. Finally, with regard to physical symptoms, she endorsed significant ongoing headaches noting that she has headaches contiguously. She indicated that approximately every other day these headaches are particularly bad such that she would rate them as a 10 on a 1-10 rating scale. On average she would describe her headaches as being at about a 3/10. At her best her headaches are a 3/10. The patient reports she is not sure what makes her symptoms worse, and rest makes her symptoms better. She indicated that she takes acetaminophen (Tylenol) and ibuprofen (Advil) and it takes the edge off, but not always. She stated also that she experiences  nausea without vomiting, balance problems (no additional falls) dizziness, worsen eye site, as well as blurred vision, fatigue, sensitivity to light and noise. She also described sleep disturbance. She experiences drowsiness, is sleeping more than usual, does not have difficulty falling asleep and when she wakes she does not feel rested.  8/23/18  The patient reports that her headaches are little bit better, they are less severe.  She reports that on 8/13 she did have a pretty bad headache.  The patient reports that she continues to have nausea with her headaches.  She reports that her balance problems and dizziness are much better, she was discharged by physical therapy.  The patient reports that her visual problems have also improved, she does have a new prescription for her glasses.  The patient reports that fatigue is better.  Patient reports that her sensitivity light and noise has not improved.  Patient reports that she tried driving and had a severe headache.  She finds it very difficult to get rides to her work, does not feel confident in her ability to drive.  Will attempt to get the patient able to work from home, so we  "do not have severe headaches by the time we get to her office.  The patient reports that cognitively she continues to have a problem, she has a lot of problems with word finding issues.  She also reports that she continues to have mental fogginess, brain slowing, difficulty concentrating, focusing and remembering.  Emotionally she feels she continues to have irritability, sadness, feelings of more motion, and increased anxiety.  She reports that her work has made her feel like she is going to lose her job, which has increased her anxiety.  She does feel that with a QR C she is more control and reports that this will hopefully help with her anxiety over the concussion.  The patient reports that on Friday she did feel \"stronger\".  Patient reports that she is sleeping better and waking feeling rested.  Overall the patient reports improvement slightly in physical, and emotional symptoms.  Patient will work with [employer] try to accommodate patient to be able to work from home.  We will slowly increase hours as patient tolerates.  9/20/18 Patient reports that the breaks are helping.  She did have a meeting with  and I gave her permission to work from home.  Patient reports that her headaches are still continuous.  She reports her eyes are \"getting better\".  She was told by the manager that she did not want her working from home.  The patient reports a cognitively she is having difficulties with multitasking, she endorses brain fogginess, brain slowing, difficulty with concentration and focus and remembering.  Emotionally she reports that she has had increased stress, she is having her  drive her to work and this is hard for her.  She reports that she does not feel she is able to drive due to her symptoms.  The patient is taking a week of PTO and then I do believe she will be working from home, see letter.  Emotionally the patient reports that her symptoms are increasing her irritability and anxiety.  She reports his " sleeping is getting slightly better but she still has problems.  Patient reports no improvement in her physical, cognitive, or emotional concussive symptoms.  I am hoping that working from home will help the patient recover more quickly.      There are no active problems to display for this patient.    No past medical history on file.  No past surgical history on file.  No family history on file.  Current Outpatient Prescriptions   Medication Sig Dispense Refill     aspirin 325 MG tablet Take 325 mg by mouth daily.       atorvastatin (LIPITOR) 20 MG tablet Take 20 mg by mouth bedtime.       CALCIUM-MAGNESIUM-ZINC ORAL Take by mouth.       CARTILAGE/COLLAGEN/BOR/HYALUR (JOINT HEALTH ORAL) Take by mouth.       hydroCHLOROthiazide (HYDRODIURIL) 12.5 MG tablet Take 12.5 mg by mouth daily.       ibuprofen (ADVIL,MOTRIN) 200 MG tablet Take 600 mg by mouth every 6 (six) hours as needed for pain.       KRILL OIL ORAL Take by mouth.       losartan (COZAAR) 100 MG tablet Take 100 mg by mouth daily.       metFORMIN (GLUCOPHAGE) 500 MG tablet Take 500 mg by mouth 2 (two) times a day with meals.       minocycline (MINOCIN,DYNACIN) 50 MG capsule Take 50 mg by mouth as needed.       TURMERIC, BULK, MISC Use As Directed.       No current facility-administered medications for this encounter.        No Known Allergies  Social History     Social History     Marital status:      Spouse name: N/A     Number of children: N/A     Years of education: N/A     Occupational History     Not on file.     Social History Main Topics     Smoking status: Not on file     Smokeless tobacco: Not on file     Alcohol use Not on file     Drug use: Not on file     Sexual activity: Not on file     Other Topics Concern     Not on file     Social History Narrative       The following portions of the patient's history were reviewed and updated as appropriate: allergies, current medications, past family history, past medical history, past social  history, past surgical history and problem list.  Review of Systems  A comprehensive review of systems was negative except for:what is noted above    Objective:       There were no vitals filed for this visit.    Imaging:  CT Head: Reviewed    Discussion was held with the patient today regarding concussion in general including types of injury, symptoms that are common, treatment and variability in time to recover. I also provided written information about concussion and symptoms that would apply to her in her concussion folder. Education about concussion symptoms and length of time it would take the patient to recover was also given to the patient.  I have reassured the patient her symptoms are very common when a concussion is present and will improve with time. We discussed the risks and benefits of the medication including risk of worsening depression with medication adjustments and even the possibility of emergence of suicidality      Total time spent with the patient today was 30 minutes with greater than 50% of the time spent in counseling and care coordination. The patient will return in about 4 weeks to this clinic for further assessment and treatment. She agrees to call before then with any questions, concerns or problems. We will assess for the appropriateness of possible psychotropic medication trials/changes. The patient will seek out appropriate emergency services should that become necessary.I will be available if any questions, concerns, or problems that arise.       Mental Status Examination  Patient is casually dressed and seated for evaluation. She is cooperative with questioning and eye contact is good. She is fully engaged in conversation today. She is alert and fully oriented. Speech is normal. Thought processes normal with normal prehension and expression. Thoughts are organized and linear. Content is pertinent to the conversation and without evidence of auditory or visual hallucinations. No  delusional ideation. Affect/mood is euthymic-bright, even. Gen. fund of knowledge, insight and memory are normal.

## 2021-06-21 NOTE — PROGRESS NOTES
Pt being seen for concussion f/u apt.  Pt states that she is doing better but symptoms are still there.

## 2021-06-22 NOTE — PROGRESS NOTES
How have you been doing since we last saw you? any concerns? Symptoms? Pt states that she has been feeling so-so symptoms.  HA's and vision issues she is reporting today.

## 2021-06-22 NOTE — PROGRESS NOTES
Assessment:     1. Concussion, with loss of consciousness for minutes  2. Post concussion syndrome  3. Dizziness  4. Headaches    Plan:       Pain control for headaches - Tylenol only due to rebound headaches, Brittany/blue tinted glasses  Sleeping Problems-monitor, sleep hygiene   Anxiety due to concussion - monitor  Decreased concentration and focus - monitor  Return to Work/School- she is taking a week of PTO, then will work from home    Patient returns concussion clinic for follow-up appointment, she was last seen by me on 10/25/18, were no medication changes were made.  Patient reports that she is doing much better since starting to work from home.  She reports she is only lost 1 day due to headaches.  She reports her headaches are no longer nearly, she reports she continues to have headaches about every other day and she rates her worst headache a 6/10.  She reports the first week was a little tough but has improved over time.  She reports she is only had nausea once, dizziness once, she continues to have fatigue and sensitive to light and noise.  Patient also reports eye soreness, I did suggest taking eyedrops and to make sure her eyes are dry.  The patient did have a evaluation with a dentist, who will help with her mild pain.  The patient reports that cognitively she is doing better but she is not currently at her baseline she still has slight memory problems and difficulty with concentration and focus.  Emotionally she reports she is doing much better, she knows with her anup that she will get back to her self.  Overall the patient is reporting significant improvement with physical, cognitive, and emotional concussive symptoms.  She continues to increase hours and use accommodations, will continue with the gradual approach of return to work, patient is agreement with plan.    Subjective:          She is a 63 y.o. female who initially presented to the concussion clinic on 7/25/8 with a blunt/closed head injury  which she sustained while at work on 4/18/18. She slipped in the parking garage and fell forward hitting her head on the concrete. The point of impact was face. Since the injury, she has had a headache. She does not have retrograde and anterograde amnesia. She has had 0 previous head injuries. First symptoms were immediately where she HA and confusion. With regard to cognitive symptoms, she endorsed significant ongoing concerns with her memory and that she has difficulties with attention and concentration as well as slowed thinking. In terms of emotional symptoms, she noted that in general she feels more emotional. She did acknowledge becoming more easily irritated and frustrated, she also reports feeling more sadness and nervousness. Finally, with regard to physical symptoms, she endorsed significant ongoing headaches noting that she has headaches contiguously. She indicated that approximately every other day these headaches are particularly bad such that she would rate them as a 10 on a 1-10 rating scale. On average she would describe her headaches as being at about a 3/10. At her best her headaches are a 3/10. The patient reports she is not sure what makes her symptoms worse, and rest makes her symptoms better. She indicated that she takes acetaminophen (Tylenol) and ibuprofen (Advil) and it takes the edge off, but not always. She stated also that she experiences  nausea without vomiting, balance problems (no additional falls) dizziness, worsen eye site, as well as blurred vision, fatigue, sensitivity to light and noise. She also described sleep disturbance. She experiences drowsiness, is sleeping more than usual, does not have difficulty falling asleep and when she wakes she does not feel rested.  8/23/18  The patient reports that her headaches are little bit better, they are less severe.  She reports that on 8/13 she did have a pretty bad headache.  The patient reports that she continues to have nausea with her  "headaches.  She reports that her balance problems and dizziness are much better, she was discharged by physical therapy.  The patient reports that her visual problems have also improved, she does have a new prescription for her glasses.  The patient reports that fatigue is better.  Patient reports that her sensitivity light and noise has not improved.  Patient reports that she tried driving and had a severe headache.  She finds it very difficult to get rides to her work, does not feel confident in her ability to drive.  Will attempt to get the patient able to work from home, so we do not have severe headaches by the time we get to her office.  The patient reports that cognitively she continues to have a problem, she has a lot of problems with word finding issues.  She also reports that she continues to have mental fogginess, brain slowing, difficulty concentrating, focusing and remembering.  Emotionally she feels she continues to have irritability, sadness, feelings of more motion, and increased anxiety.  She reports that her work has made her feel like she is going to lose her job, which has increased her anxiety.  She does feel that with a QR C she is more control and reports that this will hopefully help with her anxiety over the concussion.  The patient reports that on Friday she did feel \"stronger\".  Patient reports that she is sleeping better and waking feeling rested.  Overall the patient reports improvement slightly in physical, and emotional symptoms.  Patient will work with QR C try to accommodate patient to be able to work from home.  We will slowly increase hours as patient tolerates.  9/20/18 Patient reports that the breaks are helping.  She did have a meeting with  and I gave her permission to work from home.  Patient reports that her headaches are still continuous.  She reports her eyes are \"getting better\".  She was told by the manager that she did not want her working from home.  The patient " reports a cognitively she is having difficulties with multitasking, she endorses brain fogginess, brain slowing, difficulty with concentration and focus and remembering.  Emotionally she reports that she has had increased stress, she is having her  drive her to work and this is hard for her.  She reports that she does not feel she is able to drive due to her symptoms.  The patient is taking a week of PTO and then I do believe she will be working from home, see letter.  Emotionally the patient reports that her symptoms are increasing her irritability and anxiety.  She reports his sleeping is getting slightly better but she still has problems.  Patient reports no improvement in her physical, cognitive, or emotional concussive symptoms.  I am hoping that working from home will help the patient recover more quickly.  10/25/18.  Patient reports no real change in her symptoms.  She continues to endorse headaches, nausea, fatigue, dizziness, sensitivity light and noise, and sunrise.  Emotionally she continues to report increased irritability, feeling more emotional, and increased anxiety and sadness.  Cognitively she reports she continues to have mental fogginess, brain slowing, difficulty concentrating and remembering.  Overall the patient reports there is been very minimal reduction in headaches.  She has not yet started working from home, she reports this was happening next week.  Hopefully when patient starts working from home her symptoms will reduce.      There are no active problems to display for this patient.    No past medical history on file.  No past surgical history on file.  No family history on file.  Current Outpatient Medications   Medication Sig Dispense Refill     aspirin 325 MG tablet Take 325 mg by mouth daily.       atorvastatin (LIPITOR) 20 MG tablet Take 20 mg by mouth bedtime.       CALCIUM-MAGNESIUM-ZINC ORAL Take by mouth.       CARTILAGE/COLLAGEN/BOR/HYALUR (JOINT HEALTH ORAL) Take by  mouth.       hydroCHLOROthiazide (HYDRODIURIL) 12.5 MG tablet Take 12.5 mg by mouth daily.       ibuprofen (ADVIL,MOTRIN) 200 MG tablet Take 600 mg by mouth every 6 (six) hours as needed for pain.       KRILL OIL ORAL Take by mouth.       losartan (COZAAR) 100 MG tablet Take 100 mg by mouth daily.       metFORMIN (GLUCOPHAGE) 500 MG tablet Take 500 mg by mouth 2 (two) times a day with meals.       minocycline (MINOCIN,DYNACIN) 50 MG capsule Take 50 mg by mouth as needed.       TURMERIC, BULK, MISC Use As Directed.       No current facility-administered medications for this encounter.        No Known Allergies  Social History     Socioeconomic History     Marital status:      Spouse name: Not on file     Number of children: Not on file     Years of education: Not on file     Highest education level: Not on file   Social Needs     Financial resource strain: Not on file     Food insecurity - worry: Not on file     Food insecurity - inability: Not on file     Transportation needs - medical: Not on file     Transportation needs - non-medical: Not on file   Occupational History     Not on file   Tobacco Use     Smoking status: Not on file   Substance and Sexual Activity     Alcohol use: Not on file     Drug use: Not on file     Sexual activity: Not on file   Other Topics Concern     Not on file   Social History Narrative     Not on file       The following portions of the patient's history were reviewed and updated as appropriate: allergies, current medications, past family history, past medical history, past social history, past surgical history and problem list.  Review of Systems  A comprehensive review of systems was negative except for:what is noted above    Objective:       There were no vitals filed for this visit.    Imaging:  CT Head: Reviewed    Discussion was held with the patient today regarding concussion in general including types of injury, symptoms that are common, treatment and variability in time  to recover. I also provided written information about concussion and symptoms that would apply to her in her concussion folder. Education about concussion symptoms and length of time it would take the patient to recover was also given to the patient.  I have reassured the patient her symptoms are very common when a concussion is present and will improve with time. We discussed the risks and benefits of the medication including risk of worsening depression with medication adjustments and even the possibility of emergence of suicidality      Total time spent with the patient today was 30 minutes with greater than 50% of the time spent in counseling and care coordination. The patient will return in about 4 weeks to this clinic for further assessment and treatment. She agrees to call before then with any questions, concerns or problems. We will assess for the appropriateness of possible psychotropic medication trials/changes. The patient will seek out appropriate emergency services should that become necessary.I will be available if any questions, concerns, or problems that arise.       Mental Status Examination  Patient is casually dressed and seated for evaluation. She is cooperative with questioning and eye contact is good. She is fully engaged in conversation today. She is alert and fully oriented. Speech is normal. Thought processes normal with normal prehension and expression. Thoughts are organized and linear. Content is pertinent to the conversation and without evidence of auditory or visual hallucinations. No delusional ideation. Affect/mood is euthymic-bright, even. Gen. fund of knowledge, insight and memory are normal.

## 2021-06-22 NOTE — PROGRESS NOTES
Pt being seen for concussion f/u apt.  Pt was 10+ minutes late so I was unable to go over medications.

## 2021-06-22 NOTE — TELEPHONE ENCOUNTER
Federica ROY QRC called today to report that the letter written on 12/5/18 does not match her notes. Phillip was suppose to be on Monday, Tuesday, Thursday, Friday for 2 weeks and then go up to 40 hours a week. She doesn't need it corrected now but Aishwarya will be in on 1/9/19 so she just wanted to make sure it will be corrected by that appt. Also, Federica ROY QRC will not be able to accompany her to that appt. Another QRC from [facility] with be there. Her name is Hyun Bryson

## 2021-06-23 NOTE — PROGRESS NOTES
Assessment:     1. Concussion, with loss of consciousness for minutes  2. Post concussion syndrome  3. Dizziness  4. Headaches    Plan:       Pain control for headaches - Tylenol only due to rebound headaches, Brittany/blue tinted glasses  Sleeping Problems-monitor, sleep hygiene   Anxiety due to concussion - monitor  Decreased concentration and focus - monitor  Return to Work/School-gradual, see letter    Patient returns concussion clinic for follow-up appointment, she was last seen by me on 12/5/18, where no medication changes have been made.  The patient did have to travel back to [facility] due to the loss of her father.  Patient reports that her headaches have been pretty bad.  She tried to increase to 40 hours a week and had extreme headaches that she did go back to 32 hours.  The patient has only been doing 32 hours since being back.  She reports she did have one episode of dizziness.  The patient did not quite want to increase to 40 hours a week so I did suggest increasing slowly from 32-40.  I will allow the patient to stay at 32 for 2 more weeks and then we will slowly be increasing that fifth day until he reached 8-hours that day.  Given the patient's increased stress with her father dying this may be why she is having more headaches.  Long discussion was held with the patient about needing to build up her tolerance to her symptoms so that she can be back to work full-time, patient agreed with plan.    Subjective:          She is a 63 y.o. female who initially presented to the concussion clinic on 7/25/8 with a blunt/closed head injury which she sustained while at work on 4/18/18. She slipped in the parking garage and fell forward hitting her head on the concrete. The point of impact was face. Since the injury, she has had a headache. She does not have retrograde and anterograde amnesia. She has had 0 previous head injuries. First symptoms were immediately where she HA and confusion. With regard to cognitive  symptoms, she endorsed significant ongoing concerns with her memory and that she has difficulties with attention and concentration as well as slowed thinking. In terms of emotional symptoms, she noted that in general she feels more emotional. She did acknowledge becoming more easily irritated and frustrated, she also reports feeling more sadness and nervousness. Finally, with regard to physical symptoms, she endorsed significant ongoing headaches noting that she has headaches contiguously. She indicated that approximately every other day these headaches are particularly bad such that she would rate them as a 10 on a 1-10 rating scale. On average she would describe her headaches as being at about a 3/10. At her best her headaches are a 3/10. The patient reports she is not sure what makes her symptoms worse, and rest makes her symptoms better. She indicated that she takes acetaminophen (Tylenol) and ibuprofen (Advil) and it takes the edge off, but not always. She stated also that she experiences  nausea without vomiting, balance problems (no additional falls) dizziness, worsen eye site, as well as blurred vision, fatigue, sensitivity to light and noise. She also described sleep disturbance. She experiences drowsiness, is sleeping more than usual, does not have difficulty falling asleep and when she wakes she does not feel rested.  8/23/18  The patient reports that her headaches are little bit better, they are less severe.  She reports that on 8/13 she did have a pretty bad headache.  The patient reports that she continues to have nausea with her headaches.  She reports that her balance problems and dizziness are much better, she was discharged by physical therapy.  The patient reports that her visual problems have also improved, she does have a new prescription for her glasses.  The patient reports that fatigue is better.  Patient reports that her sensitivity light and noise has not improved.  Patient reports that she  "tried driving and had a severe headache.  She finds it very difficult to get rides to her work, does not feel confident in her ability to drive.  Will attempt to get the patient able to work from home, so we do not have severe headaches by the time we get to her office.  The patient reports that cognitively she continues to have a problem, she has a lot of problems with word finding issues.  She also reports that she continues to have mental fogginess, brain slowing, difficulty concentrating, focusing and remembering.  Emotionally she feels she continues to have irritability, sadness, feelings of more motion, and increased anxiety.  She reports that her work has made her feel like she is going to lose her job, which has increased her anxiety.  She does feel that with a QR C she is more control and reports that this will hopefully help with her anxiety over the concussion.  The patient reports that on Friday she did feel \"stronger\".  Patient reports that she is sleeping better and waking feeling rested.  Overall the patient reports improvement slightly in physical, and emotional symptoms.  Patient will work with QR C try to accommodate patient to be able to work from home.  We will slowly increase hours as patient tolerates.  9/20/18 Patient reports that the breaks are helping.  She did have a meeting with  and I gave her permission to work from home.  Patient reports that her headaches are still continuous.  She reports her eyes are \"getting better\".  She was told by the manager that she did not want her working from home.  The patient reports a cognitively she is having difficulties with multitasking, she endorses brain fogginess, brain slowing, difficulty with concentration and focus and remembering.  Emotionally she reports that she has had increased stress, she is having her  drive her to work and this is hard for her.  She reports that she does not feel she is able to drive due to her symptoms.  The " patient is taking a week of PTO and then I do believe she will be working from home, see letter.  Emotionally the patient reports that her symptoms are increasing her irritability and anxiety.  She reports his sleeping is getting slightly better but she still has problems.  Patient reports no improvement in her physical, cognitive, or emotional concussive symptoms.  I am hoping that working from home will help the patient recover more quickly.  10/25/18.  Patient reports no real change in her symptoms.  She continues to endorse headaches, nausea, fatigue, dizziness, sensitivity light and noise, and sunrise.  Emotionally she continues to report increased irritability, feeling more emotional, and increased anxiety and sadness.  Cognitively she reports she continues to have mental fogginess, brain slowing, difficulty concentrating and remembering.  Overall the patient reports there is been very minimal reduction in headaches.  She has not yet started working from home, she reports this was happening next week.  Hopefully when patient starts working from home her symptoms will reduce.  12/5/18  Patient reports that she is doing much better since starting to work from home.  She reports she is only lost 1 day due to headaches.  She reports her headaches are no longer nearly, she reports she continues to have headaches about every other day and she rates her worst headache a 6/10.  She reports the first week was a little tough but has improved over time.  She reports she is only had nausea once, dizziness once, she continues to have fatigue and sensitive to light and noise.  Patient also reports eye soreness, I did suggest taking eyedrops and to make sure her eyes are dry.  The patient did have a evaluation with a dentist, who will help with her mild pain.  The patient reports that cognitively she is doing better but she is not currently at her baseline she still has slight memory problems and difficulty with  concentration and focus.  Emotionally she reports she is doing much better, she knows with her anup that she will get back to her self.  Overall the patient is reporting significant improvement with physical, cognitive, and emotional concussive symptoms.  She continues to increase hours and use accommodations, will continue with the gradual approach of return to work, patient is agreement with plan.      There are no active problems to display for this patient.    No past medical history on file.  No past surgical history on file.  No family history on file.  Current Outpatient Medications   Medication Sig Dispense Refill     aspirin 325 MG tablet Take 325 mg by mouth daily.       atorvastatin (LIPITOR) 20 MG tablet Take 20 mg by mouth bedtime.       CALCIUM-MAGNESIUM-ZINC ORAL Take by mouth.       CARTILAGE/COLLAGEN/BOR/HYALUR (JOINT HEALTH ORAL) Take by mouth.       hydroCHLOROthiazide (HYDRODIURIL) 12.5 MG tablet Take 12.5 mg by mouth daily.       ibuprofen (ADVIL,MOTRIN) 200 MG tablet Take 600 mg by mouth every 6 (six) hours as needed for pain.       KRILL OIL ORAL Take by mouth.       losartan (COZAAR) 100 MG tablet Take 100 mg by mouth daily.       metFORMIN (GLUCOPHAGE) 500 MG tablet Take 500 mg by mouth 2 (two) times a day with meals.       minocycline (MINOCIN,DYNACIN) 50 MG capsule Take 50 mg by mouth as needed.       TURMERIC, BULK, MISC Use As Directed.       No current facility-administered medications for this encounter.        No Known Allergies  Social History     Socioeconomic History     Marital status:      Spouse name: Not on file     Number of children: Not on file     Years of education: Not on file     Highest education level: Not on file   Social Needs     Financial resource strain: Not on file     Food insecurity - worry: Not on file     Food insecurity - inability: Not on file     Transportation needs - medical: Not on file     Transportation needs - non-medical: Not on file    Occupational History     Not on file   Tobacco Use     Smoking status: Not on file   Substance and Sexual Activity     Alcohol use: Not on file     Drug use: Not on file     Sexual activity: Not on file   Other Topics Concern     Not on file   Social History Narrative     Not on file       The following portions of the patient's history were reviewed and updated as appropriate: allergies, current medications, past family history, past medical history, past social history, past surgical history and problem list.  Review of Systems  A comprehensive review of systems was negative except for:what is noted above    Objective:       There were no vitals filed for this visit.    Imaging:  CT Head: Reviewed    Discussion was held with the patient today regarding concussion in general including types of injury, symptoms that are common, treatment and variability in time to recover. I also provided written information about concussion and symptoms that would apply to her in her concussion folder. Education about concussion symptoms and length of time it would take the patient to recover was also given to the patient.  I have reassured the patient her symptoms are very common when a concussion is present and will improve with time. We discussed the risks and benefits of the medication including risk of worsening depression with medication adjustments and even the possibility of emergence of suicidality      Total time spent with the patient today was 30 minutes with greater than 50% of the time spent in counseling and care coordination. The patient will return in about 5 weeks to this clinic for further assessment and treatment. She agrees to call before then with any questions, concerns or problems. We will assess for the appropriateness of possible psychotropic medication trials/changes. The patient will seek out appropriate emergency services should that become necessary.I will be available if any questions, concerns, or  problems that arise.       Mental Status Examination  Patient is casually dressed and seated for evaluation. She is cooperative with questioning and eye contact is good. She is fully engaged in conversation today. She is alert and fully oriented. Speech is normal. Thought processes normal with normal prehension and expression. Thoughts are organized and linear. Content is pertinent to the conversation and without evidence of auditory or visual hallucinations. No delusional ideation. Affect/mood is euthymic-bright, even. Gen. fund of knowledge, insight and memory are normal.

## 2021-06-24 NOTE — PROGRESS NOTES
Assessment:     1. Post Concussion Syndrome  2. Post Concussion Headache  3. Anxiety d/t concussion    Discussed: RED FLAGS NEEDING ACUTE EMERGENCY MANAGEMENT:                          Headaches that worsen                          Seizures                          Focal Neurologic Signs                          Drowsiness/Lethargy                          Repeated vomiting                          Slurred Speech                          Inability to recognize people/places                          Increasing confusion/irritability                          Weakness/numbness                          Neck pain                          Unusual behavioral change                          Change in level of consciousness      The patient returns to the concussion clinic for a follow up appointment, she was last seen by me on 1/9/19, where no medication changes were made. The patient reports that her headaches are much better, she states that she has only had like 4-5 headaches since our last visit. The patient reports a significant increase in nausea and dizziness. A long discussion was held with the patient about concussion and treatment plans. 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 I have asked the patient if there is any way, with the QRC's and her manager's help, that we can all come up with a game plan to help make sure the patient is able to take more breaks throughout her shift      Plan:     Neuropsychological assessment completed    Yes   Pain control for headaches - Tylenol only due to rebound headaches, Brittany/blue tinted glasses  PT  Completed Yes   OT  Completed Yes    ST  Completed No   Psychology  No     MRI   Completed Yes   Labs Completed Yes      Any new medication (other provider):   No   Meds  started at last appointment  No   Meds increased at last appointment    No       Sleeping Problems-        Currently on medication  No           New med  No     Anxiety due to concussion -        Currently on medication  No          New med  No      Decreased concentration and focus -       Currently on medication No         New med  Yes, Ritalin    Work note written today   Yes          Subjective:          HPI     She is a 63 y.o. female who initially presented to the concussion clinic on 7/25/8 with a blunt/closed head injury which she sustained while at work on 4/18/18. She slipped in the parking garage and fell forward hitting her head on the concrete. The point of impact was face      Date of accident :    4/18/18                                                          Workman's Comp   No   QRC   Yes    Present: Yes     Headaches:  Significant ongoing headaches Yes  Headaches: Intermittently  Improvement :Yes   Current Headache Yes   Wake with HA  Yes       Worse Headache    5/10           How often: 3-4 times since last visit    Average Headache 3/10.    Best Headache 0/10.  Brings on HA:   computer  Makes symptoms worse  computer  Makes symptoms better. rest  Taking  Does not take over the counter medication      Physical Symptoms:  Headache-Yes        Improved since last visit  No    Improved since accident Yes  Vomiting -Yes      Improved since last visit     No           Improved since accident No   Balance problems - Yes       Improved since last visit    No           Improved since accident No   Dizziness - Yes         Improved since last visit    No           Improved since accident No   Visual problems - Yes     Improved since last visit       No           Improved since accident No   Fatigue - No            Sensitivity to light - Yes        Improved since last visit    No           Improved since accident Yes   Sensitivity to sound - Yes           Improved since last visit  No           Improved  since accident Yes   Numbness/tingling - No            Cognitive Symptoms  Feeling mentally foggy -Yes          Improved since last visit   No           Improved since accident Yes   Feeling slowed down -Yes           Improved since last visit  No           Improved since accident Yes   Difficulty Concentrating- Yes           Improved since last visit  No           Improved since accident Yes   Difficulty remembering - Yes         Improved since last visit  No           Improved since accident Yes     Emotional Symptoms  Irritability - Yes            Improved since last visit No           Improved since accident Yes   Sadness-  No       More emotional - Yes        Improved since last visit  No           Improved since accident Yes   Nervousness/anxiety -Yes        Improved since last visit No           Improved since accident Yes     Psychiatric History:  Anxiety - No  Depression - No  Sleep Disorders - No  Any thought of hurting self or others now?   No  Any history of hurting self or others?            No    Sleep History:  Drowsiness- Yes           Improved since last visit  No           Improved since accident No   Sleep less than usual - No  Sleep more than usual - No  Trouble falling asleep - No        Does the patient wake feeling rested - Yes        Migraine Headaches      Patient history of migraines.    No      Exertion:         Do the above stated symptoms worsen with physical activity? Yes           Improved since last visit  No           Improved since accident No         Do the above stated symptoms worsen with cognitive activity? Yes         Improved since last visit  No           Improved since accident No          Work/School        Do the above stated symptoms worsen with school/work?        Yes        Have your returned to work/school? Yes  Hours a day    8  Days a week  4, two hours on Wednesday  34 hour only            Number of Days that you needed to leave or miss     0               There are  no active problems to display for this patient.    No past medical history on file.  No past surgical history on file.  No family history on file.  Current Outpatient Medications   Medication Sig Dispense Refill     aspirin 325 MG tablet Take 325 mg by mouth daily.       atorvastatin (LIPITOR) 20 MG tablet Take 20 mg by mouth bedtime.       CALCIUM-MAGNESIUM-ZINC ORAL Take by mouth.       CARTILAGE/COLLAGEN/BOR/HYALUR (JOINT HEALTH ORAL) Take by mouth.       hydroCHLOROthiazide (HYDRODIURIL) 12.5 MG tablet Take 12.5 mg by mouth daily.       ibuprofen (ADVIL,MOTRIN) 200 MG tablet Take 600 mg by mouth every 6 (six) hours as needed for pain.       KRILL OIL ORAL Take by mouth.       losartan (COZAAR) 100 MG tablet Take 100 mg by mouth daily.       metFORMIN (GLUCOPHAGE) 500 MG tablet Take 500 mg by mouth 2 (two) times a day with meals.       minocycline (MINOCIN,DYNACIN) 50 MG capsule Take 50 mg by mouth as needed.       TURMERIC, BULK, MISC Use As Directed.       No current facility-administered medications for this encounter.        No Known Allergies  Social History     Socioeconomic History     Marital status:      Spouse name: Not on file     Number of children: Not on file     Years of education: Not on file     Highest education level: Not on file   Social Needs     Financial resource strain: Not on file     Food insecurity - worry: Not on file     Food insecurity - inability: Not on file     Transportation needs - medical: Not on file     Transportation needs - non-medical: Not on file   Occupational History     Not on file   Tobacco Use     Smoking status: Not on file   Substance and Sexual Activity     Alcohol use: Not on file     Drug use: Not on file     Sexual activity: Not on file   Other Topics Concern     Not on file   Social History Narrative     Not on file       The following portions of the patient's history were reviewed and updated as appropriate: allergies, current medications, past family  history, past medical history, past social history, past surgical history and problem list.    Review of Systems  A comprehensive review of systems was negative except for: What is noted above    Objective:       Discussion was held with the patient today regarding concussion in general including types of injury, symptoms that are common, treatment and variability in time to recover. I also provided written information about concussion and symptoms that would apply to her in her concussion folder. Education about concussion symptoms and length of time it would take the patient to recover was also given to the patient.  I have reassured the patient her symptoms are very common when a concussion is present and will improve with time. We discussed the risks and benefits of the medication including risk of worsening depression with medication adjustments and even the possibility of emergence of suicidal ideations.       Total time spent with the patient today was 30 minutes with greater than 50% of the time spent in counseling and care coordination. The patient will return in about 4 weeks to this clinic for further assessment and treatment. She agrees to call before then with any questions, concerns or problems. We will assess for the appropriateness of possible psychotropic medication trials/changes. The patient will seek out appropriate emergency services should that become necessary.I will be available if any questions, concerns, or problems that arise.     Mental Status Examination  Patient is casually dressed and seated for evaluation. She is cooperative with questioning and eye contact is good. She is fully engaged in conversation today. She is alert and fully oriented. Speech is normal. Thought processes normal with normal prehension and expression. Thoughts are organized and linear. Content is pertinent to the conversation and without evidence of auditory or visual hallucinations. No delusional ideation.  Affect/mood is euthymic-bright, even. Gen. fund of knowledge, insight and memory are normal.

## 2021-06-25 NOTE — TELEPHONE ENCOUNTER
Unfortunately, Aishwarya hung up the phone abruptly and did not allow any room for this writer to speak. If Aishwarya calls back, I will relay your advise. Thank you.

## 2021-06-25 NOTE — TELEPHONE ENCOUNTER
"Aishwarya called in regards to her blood pressure. She reports the last few times she has been in for a visit, the blood pressure machine has recorded her blood pressure as high. She states \"my blood pressure is not high, I have been to my primary doctor, and I have a blood pressure machine at home. My blood pressure is not high. I want this taken off my record\". Aishwarya did not allow this writer to ask her any questions about this. She hung up the phone. FYI only.  "

## 2021-07-03 NOTE — ADDENDUM NOTE
Addendum Note by Haleigh Avery CMA at 8/23/2018 10:33 AM     Author: Haleigh Avery CMA Service: -- Author Type: Certified Medical Assistant    Filed: 8/23/2018 10:33 AM Date of Service: 8/23/2018 10:33 AM Status: Signed    : Haleigh Avery CMA (Certified Medical Assistant)    Encounter addended by: Haleigh Avery CMA on: 8/23/2018 10:33 AM<BR>     Actions taken: Charge Capture section accepted

## 2021-07-03 NOTE — ADDENDUM NOTE
Addendum Note by Haleigh Avery CMA at 9/20/2018 10:01 AM     Author: Haleigh Avery CMA Service: -- Author Type: Certified Medical Assistant    Filed: 9/26/2018  9:36 AM Date of Service: 9/20/2018 10:01 AM Status: Signed    : Haleigh Avery CMA (Certified Medical Assistant)    Encounter addended by: Haleigh Avery CMA on: 9/26/2018  9:36 AM<BR>     Actions taken: Charge Capture section accepted

## 2021-07-03 NOTE — ADDENDUM NOTE
Addendum Note by Haleigh Avery CMA at 7/25/2018 11:59 PM     Author: Haleigh Avery CMA Service: -- Author Type: Certified Medical Assistant    Filed: 7/27/2018 11:28 AM Date of Service: 7/25/2018 11:59 PM Status: Signed    : Haleigh Avery CMA (Certified Medical Assistant)    Encounter addended by: Haleigh Avery CMA on: 7/27/2018 11:28 AM<BR>     Actions taken: Charge Capture section accepted

## 2021-07-03 NOTE — ADDENDUM NOTE
Addendum Note by Haleigh Avery CMA at 3/27/2019 11:59 PM     Author: Haleigh Avery CMA Service: -- Author Type: Certified Medical Assistant    Filed: 3/29/2019  7:52 AM Date of Service: 3/27/2019 11:59 PM Status: Signed    : Haleigh Avery CMA (Certified Medical Assistant)    Encounter addended by: Haleigh Avery CMA on: 3/29/2019  7:52 AM      Actions taken: Charge Capture section accepted

## 2021-07-03 NOTE — ADDENDUM NOTE
Addendum Note by Haleigh Avery CMA at 1/9/2019 11:59 PM     Author: Haleigh Avery CMA Service: -- Author Type: Certified Medical Assistant    Filed: 1/17/2019  1:52 PM Date of Service: 1/9/2019 11:59 PM Status: Signed    : Haleigh Avery CMA (Certified Medical Assistant)    Encounter addended by: Haleigh Avery CMA on: 1/17/2019  1:52 PM      Actions taken: Charge Capture section accepted

## 2021-07-03 NOTE — ADDENDUM NOTE
Addendum Note by Haleigh Avery CMA at 12/5/2018 11:59 PM     Author: Haleigh Avery CMA Service: -- Author Type: Certified Medical Assistant    Filed: 12/6/2018 12:36 PM Date of Service: 12/5/2018 11:59 PM Status: Signed    : Haleigh Avery CMA (Certified Medical Assistant)    Encounter addended by: Haleigh Avery CMA on: 12/6/2018 12:36 PM      Actions taken: Charge Capture section accepted

## 2021-07-03 NOTE — ADDENDUM NOTE
Addendum Note by Haleigh Avery CMA at 10/25/2018 11:59 PM     Author: Haleigh Avery CMA Service: -- Author Type: Certified Medical Assistant    Filed: 10/29/2018 11:47 AM Date of Service: 10/25/2018 11:59 PM Status: Signed    : Haleigh Avery CMA (Certified Medical Assistant)    Encounter addended by: Haleigh Avery CMA on: 10/29/2018 11:47 AM<BR>     Actions taken: Charge Capture section accepted

## 2021-12-30 ENCOUNTER — HOSPITAL ENCOUNTER (OUTPATIENT)
Dept: MAMMOGRAPHY | Facility: CLINIC | Age: 66
Discharge: HOME | End: 2021-12-30
Attending: FAMILY MEDICINE | Admitting: FAMILY MEDICINE
Payer: COMMERCIAL

## 2021-12-30 DIAGNOSIS — Z12.31 VISIT FOR SCREENING MAMMOGRAM: ICD-10-CM

## 2021-12-30 PROCEDURE — 77063 BREAST TOMOSYNTHESIS BI: CPT

## 2022-01-28 ENCOUNTER — APPOINTMENT (OUTPATIENT)
Dept: URBAN - METROPOLITAN AREA CLINIC 253 | Age: 67
Setting detail: DERMATOLOGY
End: 2022-01-28

## 2022-01-28 VITALS — HEIGHT: 65 IN | WEIGHT: 185 LBS | RESPIRATION RATE: 15 BRPM

## 2022-01-28 DIAGNOSIS — L91.0 HYPERTROPHIC SCAR: ICD-10-CM

## 2022-01-28 DIAGNOSIS — L70.0 ACNE VULGARIS: ICD-10-CM

## 2022-01-28 DIAGNOSIS — L82.0 INFLAMED SEBORRHEIC KERATOSIS: ICD-10-CM

## 2022-01-28 PROCEDURE — OTHER LIQUID NITROGEN: OTHER

## 2022-01-28 PROCEDURE — OTHER MIPS QUALITY: OTHER

## 2022-01-28 PROCEDURE — OTHER COUNSELING: OTHER

## 2022-01-28 PROCEDURE — OTHER ADDITIONAL NOTES: OTHER

## 2022-01-28 PROCEDURE — 99203 OFFICE O/P NEW LOW 30 MIN: CPT | Mod: 25

## 2022-01-28 PROCEDURE — OTHER PRESCRIPTION: OTHER

## 2022-01-28 PROCEDURE — 17110 DESTRUCT B9 LESION 1-14: CPT

## 2022-01-28 RX ORDER — TRETIONIN 0.25 MG/G
0.025% CREAM TOPICAL QHS
Qty: 45 | Refills: 2 | Status: ERX | COMMUNITY
Start: 2022-01-28

## 2022-01-28 RX ORDER — DESONIDE 0.5 MG/G
0.05% CREAM TOPICAL BID
Qty: 60 | Refills: 1 | Status: ERX | COMMUNITY
Start: 2022-01-28

## 2022-01-28 ASSESSMENT — LOCATION DETAILED DESCRIPTION DERM
LOCATION DETAILED: LEFT INFERIOR CENTRAL MALAR CHEEK
LOCATION DETAILED: MONS PUBIS
LOCATION DETAILED: LEFT LATERAL MALLEOLUS

## 2022-01-28 ASSESSMENT — LOCATION ZONE DERM
LOCATION ZONE: VULVA
LOCATION ZONE: FACE
LOCATION ZONE: FEET

## 2022-01-28 ASSESSMENT — LOCATION SIMPLE DESCRIPTION DERM
LOCATION SIMPLE: LEFT FOOT
LOCATION SIMPLE: LEFT CHEEK
LOCATION SIMPLE: GROIN

## 2022-01-28 NOTE — PROCEDURE: LIQUID NITROGEN
Render Post-Care Instructions In Note?: no
Show Topical Anesthesia Variable?: Yes
Consent: The patient's consent was obtained including but not limited to risks of crusting, scabbing, blistering, scarring, darker or lighter pigmentary change, recurrence, incomplete removal and infection.
Detail Level: Detailed
Medical Necessity Information: It is in your best interest to select a reason for this procedure from the list below. All of these items fulfill various CMS LCD requirements except the new and changing color options.
Medical Necessity Clause: This procedure was medically necessary because the lesions that were treated were:
Spray Paint Text: The liquid nitrogen was applied to the skin utilizing a spray paint frosting technique.
Post-Care Instructions: I reviewed with the patient in detail post-care instructions. Patient is to wear sunprotection, and avoid picking at any of the treated lesions. Pt may apply Vaseline to crusted or scabbing areas.

## 2022-01-28 NOTE — PROCEDURE: COUNSELING
Azithromycin Counseling:  I discussed with the patient the risks of azithromycin including but not limited to GI upset, allergic reaction, drug rash, diarrhea, and yeast infections.
Topical Retinoid Pregnancy And Lactation Text: This medication is Pregnancy Category C. It is unknown if this medication is excreted in breast milk.
High Dose Vitamin A Pregnancy And Lactation Text: High dose vitamin A therapy is contraindicated during pregnancy and breast feeding.
Isotretinoin Counseling: Patient should get monthly blood tests, not donate blood, not drive at night if vision affected, not share medication, and not undergo elective surgery for 6 months after tx completed. Side effects reviewed, pt to contact office should one occur.
Spironolactone Counseling: Patient advised regarding risks of diarrhea, abdominal pain, hyperkalemia, birth defects (for female patients), liver toxicity and renal toxicity. The patient may need blood work to monitor liver and kidney function and potassium levels while on therapy. The patient verbalized understanding of the proper use and possible adverse effects of spironolactone.  All of the patient's questions and concerns were addressed.
Topical Sulfur Applications Counseling: Topical Sulfur Counseling: Patient counseled that this medication may cause skin irritation or allergic reactions.  In the event of skin irritation, the patient was advised to reduce the amount of the drug applied or use it less frequently.   The patient verbalized understanding of the proper use and possible adverse effects of topical sulfur application.  All of the patient's questions and concerns were addressed.
Detail Level: Detailed
Tetracycline Pregnancy And Lactation Text: This medication is Pregnancy Category D and not consider safe during pregnancy. It is also excreted in breast milk.
Benzoyl Peroxide Counseling: Patient counseled that medicine may cause skin irritation and bleach clothing.  In the event of skin irritation, the patient was advised to reduce the amount of the drug applied or use it less frequently.   The patient verbalized understanding of the proper use and possible adverse effects of benzoyl peroxide.  All of the patient's questions and concerns were addressed.
Sarecycline Counseling: Patient advised regarding possible photosensitivity and discoloration of the teeth, skin, lips, tongue and gums.  Patient instructed to avoid sunlight, if possible.  When exposed to sunlight, patients should wear protective clothing, sunglasses, and sunscreen.  The patient was instructed to call the office immediately if the following severe adverse effects occur:  hearing changes, easy bruising/bleeding, severe headache, or vision changes.  The patient verbalized understanding of the proper use and possible adverse effects of sarecycline.  All of the patient's questions and concerns were addressed.
Erythromycin Pregnancy And Lactation Text: This medication is Pregnancy Category B and is considered safe during pregnancy. It is also excreted in breast milk.
Patient Specific Counseling (Will Not Stick From Patient To Patient): Declines further ILK today, discussed lesion itself appears improved but patient may have some irritant dermatitis in the region that will be better improved with topical management
Birth Control Pills Pregnancy And Lactation Text: This medication should be avoided if pregnant and for the first 30 days post-partum.
Topical Retinoid counseling:  Patient advised to apply a pea-sized amount only at bedtime and wait 30 minutes after washing their face before applying.  If too drying, patient may add a non-comedogenic moisturizer. The patient verbalized understanding of the proper use and possible adverse effects of retinoids.  All of the patient's questions and concerns were addressed.
Winlevi Counseling:  I discussed with the patient the risks of topical clascoterone including but not limited to erythema, scaling, itching, and stinging. Patient voiced their understanding.
Doxycycline Counseling:  Patient counseled regarding possible photosensitivity and increased risk for sunburn.  Patient instructed to avoid sunlight, if possible.  When exposed to sunlight, patients should wear protective clothing, sunglasses, and sunscreen.  The patient was instructed to call the office immediately if the following severe adverse effects occur:  hearing changes, easy bruising/bleeding, severe headache, or vision changes.  The patient verbalized understanding of the proper use and possible adverse effects of doxycycline.  All of the patient's questions and concerns were addressed.
Dapsone Pregnancy And Lactation Text: This medication is Pregnancy Category C and is not considered safe during pregnancy or breast feeding.
Birth Control Pills Counseling: Birth Control Pill Counseling: I discussed with the patient the potential side effects of OCPs including but not limited to increased risk of stroke, heart attack, thrombophlebitis, deep venous thrombosis, hepatic adenomas, breast changes, GI upset, headaches, and depression.  The patient verbalized understanding of the proper use and possible adverse effects of OCPs. All of the patient's questions and concerns were addressed.
Detail Level: Zone
Spironolactone Pregnancy And Lactation Text: This medication can cause feminization of the male fetus and should be avoided during pregnancy. The active metabolite is also found in breast milk.
Winlevi Pregnancy And Lactation Text: This medication is considered safe during pregnancy and breastfeeding.
Dapsone Counseling: I discussed with the patient the risks of dapsone including but not limited to hemolytic anemia, agranulocytosis, rashes, methemoglobinemia, kidney failure, peripheral neuropathy, headaches, GI upset, and liver toxicity.  Patients who start dapsone require monitoring including baseline LFTs and weekly CBCs for the first month, then every month thereafter.  The patient verbalized understanding of the proper use and possible adverse effects of dapsone.  All of the patient's questions and concerns were addressed.
Isotretinoin Pregnancy And Lactation Text: This medication is Pregnancy Category X and is considered extremely dangerous during pregnancy. It is unknown if it is excreted in breast milk.
Bactrim Counseling:  I discussed with the patient the risks of sulfa antibiotics including but not limited to GI upset, allergic reaction, drug rash, diarrhea, dizziness, photosensitivity, and yeast infections.  Rarely, more serious reactions can occur including but not limited to aplastic anemia, agranulocytosis, methemoglobinemia, blood dyscrasias, liver or kidney failure, lung infiltrates or desquamative/blistering drug rashes.
Tetracycline Counseling: Patient counseled regarding possible photosensitivity and increased risk for sunburn.  Patient instructed to avoid sunlight, if possible.  When exposed to sunlight, patients should wear protective clothing, sunglasses, and sunscreen.  The patient was instructed to call the office immediately if the following severe adverse effects occur:  hearing changes, easy bruising/bleeding, severe headache, or vision changes.  The patient verbalized understanding of the proper use and possible adverse effects of tetracycline.  All of the patient's questions and concerns were addressed. Patient understands to avoid pregnancy while on therapy due to potential birth defects.
Topical Clindamycin Pregnancy And Lactation Text: This medication is Pregnancy Category B and is considered safe during pregnancy. It is unknown if it is excreted in breast milk.
Tazorac Counseling:  Patient advised that medication is irritating and drying.  Patient may need to apply sparingly and wash off after an hour before eventually leaving it on overnight.  The patient verbalized understanding of the proper use and possible adverse effects of tazorac.  All of the patient's questions and concerns were addressed.
Topical Sulfur Applications Pregnancy And Lactation Text: This medication is Pregnancy Category C and has an unknown safety profile during pregnancy. It is unknown if this topical medication is excreted in breast milk.
Use Enhanced Medication Counseling?: No
Patient Specific Counseling (Will Not Stick From Patient To Patient): Offered to extract a couple closed comedones that may be causing patient itchiness, she declined however and would like to trial topicals
Minocycline Counseling: Patient advised regarding possible photosensitivity and discoloration of the teeth, skin, lips, tongue and gums.  Patient instructed to avoid sunlight, if possible.  When exposed to sunlight, patients should wear protective clothing, sunglasses, and sunscreen.  The patient was instructed to call the office immediately if the following severe adverse effects occur:  hearing changes, easy bruising/bleeding, severe headache, or vision changes.  The patient verbalized understanding of the proper use and possible adverse effects of minocycline.  All of the patient's questions and concerns were addressed.
Doxycycline Pregnancy And Lactation Text: This medication is Pregnancy Category D and not consider safe during pregnancy. It is also excreted in breast milk but is considered safe for shorter treatment courses.
Benzoyl Peroxide Pregnancy And Lactation Text: This medication is Pregnancy Category C. It is unknown if benzoyl peroxide is excreted in breast milk.
Azithromycin Pregnancy And Lactation Text: This medication is considered safe during pregnancy and is also secreted in breast milk.
Erythromycin Counseling:  I discussed with the patient the risks of erythromycin including but not limited to GI upset, allergic reaction, drug rash, diarrhea, increase in liver enzymes, and yeast infections.
Bactrim Pregnancy And Lactation Text: This medication is Pregnancy Category D and is known to cause fetal risk.  It is also excreted in breast milk.
Topical Clindamycin Counseling: Patient counseled that this medication may cause skin irritation or allergic reactions.  In the event of skin irritation, the patient was advised to reduce the amount of the drug applied or use it less frequently.   The patient verbalized understanding of the proper use and possible adverse effects of clindamycin.  All of the patient's questions and concerns were addressed.
Tazorac Pregnancy And Lactation Text: This medication is not safe during pregnancy. It is unknown if this medication is excreted in breast milk.
High Dose Vitamin A Counseling: Side effects reviewed, pt to contact office should one occur.

## 2022-01-28 NOTE — PROCEDURE: ADDITIONAL NOTES
Additional Notes: Was clear tretinoin may take awhile to resolve closed comedones\\n\\nA clinical assistant was present for the exam. Care instructions of treated sites were explained to the patient in detail. Told patient to call with any concerns or questions. The patient verbalized understanding and agreement of the education provided and the treatment plan. Encouraged patient to schedule a follow up appointment right after visit. At the end of the visit, all questions had been answered and the patient was satisfied with the visit.
Render Risk Assessment In Note?: no
Detail Level: Simple

## 2022-01-28 NOTE — HPI: EVALUATION OF SKIN LESION(S)
What Type Of Note Output Would You Prefer (Optional)?: Standard Output
Have Your Spot(S) Been Treated In The Past?: has not been treated
Hpi Title: Evaluation of Skin Lesions
Additional History: A few spots she would like frozen again. She has had this done at her previous derm. The spots on the lip itch. The others are bothersome. She previously had a cold spray done on them. She thinks one of them on her face got more raised after the treatment. \\nOn her pubic area, she had a boil then too, and she had an injection done that helped a lot with it being raised and itchy. It is still raised and itchy but the injection helped a lot.

## 2022-05-10 ENCOUNTER — APPOINTMENT (OUTPATIENT)
Dept: URBAN - METROPOLITAN AREA CLINIC 253 | Age: 67
Setting detail: DERMATOLOGY
End: 2022-05-10

## 2022-05-10 VITALS — RESPIRATION RATE: 14 BRPM | HEIGHT: 65 IN | WEIGHT: 185 LBS

## 2022-05-10 DIAGNOSIS — L81.0 POSTINFLAMMATORY HYPERPIGMENTATION: ICD-10-CM

## 2022-05-10 DIAGNOSIS — L82.0 INFLAMED SEBORRHEIC KERATOSIS: ICD-10-CM

## 2022-05-10 DIAGNOSIS — L72.0 EPIDERMAL CYST: ICD-10-CM

## 2022-05-10 PROCEDURE — OTHER ADDITIONAL NOTES: OTHER

## 2022-05-10 PROCEDURE — OTHER COUNSELING: OTHER

## 2022-05-10 PROCEDURE — OTHER PRESCRIPTION: OTHER

## 2022-05-10 PROCEDURE — OTHER LIQUID NITROGEN: OTHER

## 2022-05-10 PROCEDURE — 99212 OFFICE O/P EST SF 10 MIN: CPT | Mod: 25

## 2022-05-10 PROCEDURE — 17110 DESTRUCT B9 LESION 1-14: CPT

## 2022-05-10 PROCEDURE — OTHER BENIGN DESTRUCTION: OTHER

## 2022-05-10 RX ORDER — TRETIONIN 0.25 MG/G
0.025% CREAM TOPICAL QHS
Qty: 45 | Refills: 3 | Status: ERX

## 2022-05-10 ASSESSMENT — LOCATION ZONE DERM
LOCATION ZONE: LIP
LOCATION ZONE: FACE
LOCATION ZONE: FEET

## 2022-05-10 ASSESSMENT — LOCATION SIMPLE DESCRIPTION DERM
LOCATION SIMPLE: LEFT FOOT
LOCATION SIMPLE: LEFT FOREHEAD
LOCATION SIMPLE: RIGHT LIP

## 2022-05-10 ASSESSMENT — LOCATION DETAILED DESCRIPTION DERM
LOCATION DETAILED: LEFT MEDIAL FOREHEAD
LOCATION DETAILED: RIGHT INFERIOR VERMILION LIP
LOCATION DETAILED: LEFT LATERAL MALLEOLUS

## 2022-05-10 NOTE — PROCEDURE: BENIGN DESTRUCTION
Treatment Number (Will Not Render If 0): 0
Consent: The patient's consent was obtained including but not limited to risks of crusting, scabbing, blistering, scarring, darker or lighter pigmentary change, recurrence, incomplete removal and infection.
Post-Care Instructions: I reviewed with the patient in detail post-care instructions. Patient is to wear sunprotection, and avoid picking at any of the treated lesions. Pt may apply Vaseline to crusted or scabbing areas.
Include Z78.9 (Other Specified Conditions Influencing Health Status) As An Associated Diagnosis?: No
Medical Necessity Information: It is in your best interest to select a reason for this procedure from the list below. All of these items fulfill various CMS LCD requirements except the new and changing color options.
Detail Level: Detailed
Anesthesia Volume In Cc: 0.5
Medical Necessity Clause: This procedure was medically necessary because the lesions that were treated were:

## 2022-05-10 NOTE — HPI: EVALUATION OF SKIN LESION(S)
What Type Of Note Output Would You Prefer (Optional)?: Standard Output
Hpi Title: Evaluation of Skin Lesions
Additional History: Spot on her ankle that didn’t go away after treatment. It didn’t peel off like she had hoped. She called and the person she talked to said she can come back for follow up treatment. she also has a spot on her lip that is getting itchy. She had something similar treated in her cheek before but he didn’t do the one on the lip at that same time.

## 2022-05-10 NOTE — PROCEDURE: LIQUID NITROGEN
Show Spray Paint Technique Variable?: Yes
Medical Necessity Information: It is in your best interest to select a reason for this procedure from the list below. All of these items fulfill various CMS LCD requirements except the new and changing color options.
Add 52 Modifier (Optional): no
Detail Level: Detailed
Consent: The patient's consent was obtained including but not limited to risks of crusting, scabbing, blistering, scarring, darker or lighter pigmentary change, recurrence, incomplete removal and infection.
Medical Necessity Clause: This procedure was medically necessary because the lesions that were treated were:
Post-Care Instructions: I reviewed with the patient in detail post-care instructions. Patient is to wear sunprotection, and avoid picking at any of the treated lesions. Pt may apply Vaseline to crusted or scabbing areas.
Spray Paint Text: The liquid nitrogen was applied to the skin utilizing a spray paint frosting technique.

## 2022-05-10 NOTE — PROCEDURE: ADDITIONAL NOTES
Detail Level: Simple
Additional Notes: If not resolved in 2-3 weeks, advised pt return and may consider biopsy
Render Risk Assessment In Note?: no
Additional Notes: A clinical assistant was present for the exam. Care instructions of treated sites were explained to the patient in detail. Told patient to call with any concerns or questions. The patient verbalized understanding and agreement of the education provided and the treatment plan. Encouraged patient to schedule a follow up appointment right after visit. At the end of the visit, all questions had been answered and the patient was satisfied with the visit.

## 2023-02-08 ENCOUNTER — HOSPITAL ENCOUNTER (EMERGENCY)
Facility: CLINIC | Age: 68
Discharge: LEFT BEFORE DISCHARGE | End: 2023-02-08
Payer: COMMERCIAL

## 2023-02-08 VITALS
DIASTOLIC BLOOD PRESSURE: 80 MMHG | TEMPERATURE: 98.1 F | RESPIRATION RATE: 18 BRPM | OXYGEN SATURATION: 98 % | SYSTOLIC BLOOD PRESSURE: 132 MMHG | HEART RATE: 98 BPM

## 2023-02-08 RX ORDER — ONDANSETRON 2 MG/ML
4 INJECTION INTRAMUSCULAR; INTRAVENOUS ONCE
Status: DISCONTINUED | OUTPATIENT
Start: 2023-02-08 | End: 2023-02-09 | Stop reason: HOSPADM

## 2023-02-09 NOTE — ED TRIAGE NOTES
Patient reports she began having left sided flank pain last night. Patient also reports nausea, vomiting, fatigue throughout today. Patient went to [facility] in [place] and they told patient to come here.      Triage Assessment     Row Name 02/08/23 8833       Triage Assessment (Adult)    Airway WDL WDL       Respiratory WDL    Respiratory WDL WDL       Skin Circulation/Temperature WDL    Skin Circulation/Temperature WDL WDL       Cardiac WDL    Cardiac WDL WDL       Peripheral/Neurovascular WDL    Peripheral Neurovascular WDL WDL       Cognitive/Neuro/Behavioral WDL    Cognitive/Neuro/Behavioral WDL WDL

## 2023-02-11 ENCOUNTER — APPOINTMENT (OUTPATIENT)
Dept: CT IMAGING | Facility: CLINIC | Age: 68
End: 2023-02-11
Attending: EMERGENCY MEDICINE
Payer: COMMERCIAL

## 2023-02-11 ENCOUNTER — HOSPITAL ENCOUNTER (EMERGENCY)
Facility: CLINIC | Age: 68
Discharge: HOME | End: 2023-02-11
Attending: EMERGENCY MEDICINE | Admitting: EMERGENCY MEDICINE
Payer: COMMERCIAL

## 2023-02-11 VITALS
DIASTOLIC BLOOD PRESSURE: 66 MMHG | TEMPERATURE: 97.2 F | RESPIRATION RATE: 16 BRPM | HEART RATE: 77 BPM | OXYGEN SATURATION: 100 % | SYSTOLIC BLOOD PRESSURE: 126 MMHG

## 2023-02-11 DIAGNOSIS — R10.84 ABDOMINAL PAIN, GENERALIZED: ICD-10-CM

## 2023-02-11 DIAGNOSIS — R11.2 NAUSEA AND VOMITING, UNSPECIFIED VOMITING TYPE: ICD-10-CM

## 2023-02-11 LAB
ALBUMIN SERPL BCG-MCNC: 4.5 G/DL (ref 3.5–5.2)
ALBUMIN UR-MCNC: NEGATIVE MG/DL
ALP SERPL-CCNC: 68 U/L (ref 35–104)
ALT SERPL W P-5'-P-CCNC: 38 U/L (ref 10–35)
ANION GAP SERPL CALCULATED.3IONS-SCNC: 11 MMOL/L (ref 7–15)
APPEARANCE UR: CLEAR
AST SERPL W P-5'-P-CCNC: 38 U/L (ref 10–35)
BASOPHILS # BLD AUTO: 0 10E3/UL (ref 0–0.2)
BASOPHILS NFR BLD AUTO: 0 %
BILIRUB SERPL-MCNC: 1 MG/DL
BILIRUB UR QL STRIP: NEGATIVE
BUN SERPL-MCNC: 10.2 MG/DL (ref 8–23)
CALCIUM SERPL-MCNC: 10 MG/DL (ref 8.8–10.2)
CHLORIDE SERPL-SCNC: 96 MMOL/L (ref 98–107)
COLOR UR AUTO: ABNORMAL
CREAT SERPL-MCNC: 0.83 MG/DL (ref 0.51–0.95)
DEPRECATED HCO3 PLAS-SCNC: 28 MMOL/L (ref 22–29)
EOSINOPHIL # BLD AUTO: 0 10E3/UL (ref 0–0.7)
EOSINOPHIL NFR BLD AUTO: 0 %
ERYTHROCYTE [DISTWIDTH] IN BLOOD BY AUTOMATED COUNT: 14.5 % (ref 10–15)
GFR SERPL CREATININE-BSD FRML MDRD: 77 ML/MIN/1.73M2
GLUCOSE SERPL-MCNC: 133 MG/DL (ref 70–99)
GLUCOSE UR STRIP-MCNC: >=1000 MG/DL
HCT VFR BLD AUTO: 42.5 % (ref 35–47)
HGB BLD-MCNC: 13.6 G/DL (ref 11.7–15.7)
HGB UR QL STRIP: NEGATIVE
IMM GRANULOCYTES # BLD: 0 10E3/UL
IMM GRANULOCYTES NFR BLD: 0 %
KETONES UR STRIP-MCNC: NEGATIVE MG/DL
LACTATE SERPL-SCNC: 1.2 MMOL/L (ref 0.7–2)
LEUKOCYTE ESTERASE UR QL STRIP: NEGATIVE
LIPASE SERPL-CCNC: 24 U/L (ref 13–60)
LYMPHOCYTES # BLD AUTO: 2.2 10E3/UL (ref 0.8–5.3)
LYMPHOCYTES NFR BLD AUTO: 32 %
MCH RBC QN AUTO: 29.8 PG (ref 26.5–33)
MCHC RBC AUTO-ENTMCNC: 32 G/DL (ref 31.5–36.5)
MCV RBC AUTO: 93 FL (ref 78–100)
MONOCYTES # BLD AUTO: 0.6 10E3/UL (ref 0–1.3)
MONOCYTES NFR BLD AUTO: 8 %
NEUTROPHILS # BLD AUTO: 4 10E3/UL (ref 1.6–8.3)
NEUTROPHILS NFR BLD AUTO: 60 %
NITRATE UR QL: NEGATIVE
NRBC # BLD AUTO: 0 10E3/UL
NRBC BLD AUTO-RTO: 0 /100
PH UR STRIP: 7 [PH] (ref 5–7)
PLATELET # BLD AUTO: 335 10E3/UL (ref 150–450)
POTASSIUM SERPL-SCNC: 4 MMOL/L (ref 3.4–5.3)
PROT SERPL-MCNC: 7.8 G/DL (ref 6.4–8.3)
RBC # BLD AUTO: 4.56 10E6/UL (ref 3.8–5.2)
RBC URINE: 1 /HPF
SODIUM SERPL-SCNC: 135 MMOL/L (ref 136–145)
SP GR UR STRIP: 1.02 (ref 1–1.03)
SQUAMOUS EPITHELIAL: 1 /HPF
UROBILINOGEN UR STRIP-MCNC: NORMAL MG/DL
WBC # BLD AUTO: 6.8 10E3/UL (ref 4–11)
WBC URINE: <1 /HPF

## 2023-02-11 PROCEDURE — 85025 COMPLETE CBC W/AUTO DIFF WBC: CPT | Performed by: EMERGENCY MEDICINE

## 2023-02-11 PROCEDURE — 96361 HYDRATE IV INFUSION ADD-ON: CPT

## 2023-02-11 PROCEDURE — 74177 CT ABD & PELVIS W/CONTRAST: CPT

## 2023-02-11 PROCEDURE — 80053 COMPREHEN METABOLIC PANEL: CPT | Performed by: EMERGENCY MEDICINE

## 2023-02-11 PROCEDURE — 81001 URINALYSIS AUTO W/SCOPE: CPT | Performed by: EMERGENCY MEDICINE

## 2023-02-11 PROCEDURE — 36415 COLL VENOUS BLD VENIPUNCTURE: CPT | Performed by: EMERGENCY MEDICINE

## 2023-02-11 PROCEDURE — 99285 EMERGENCY DEPT VISIT HI MDM: CPT | Mod: 25

## 2023-02-11 PROCEDURE — 96374 THER/PROPH/DIAG INJ IV PUSH: CPT | Mod: 59

## 2023-02-11 PROCEDURE — 250N000009 HC RX 250: Performed by: EMERGENCY MEDICINE

## 2023-02-11 PROCEDURE — 250N000013 HC RX MED GY IP 250 OP 250 PS 637: Performed by: EMERGENCY MEDICINE

## 2023-02-11 PROCEDURE — 250N000011 HC RX IP 250 OP 636: Performed by: EMERGENCY MEDICINE

## 2023-02-11 PROCEDURE — 83690 ASSAY OF LIPASE: CPT | Performed by: EMERGENCY MEDICINE

## 2023-02-11 PROCEDURE — 96375 TX/PRO/DX INJ NEW DRUG ADDON: CPT

## 2023-02-11 PROCEDURE — 258N000003 HC RX IP 258 OP 636: Performed by: EMERGENCY MEDICINE

## 2023-02-11 PROCEDURE — 83605 ASSAY OF LACTIC ACID: CPT | Performed by: EMERGENCY MEDICINE

## 2023-02-11 RX ORDER — MORPHINE SULFATE 2 MG/ML
2-4 INJECTION, SOLUTION INTRAMUSCULAR; INTRAVENOUS EVERY 30 MIN PRN
Status: DISCONTINUED | OUTPATIENT
Start: 2023-02-11 | End: 2023-02-11 | Stop reason: HOSPADM

## 2023-02-11 RX ORDER — POLYETHYLENE GLYCOL 3350 17 G/17G
1 POWDER, FOR SOLUTION ORAL 2 TIMES DAILY
Qty: 527 G | Refills: 0 | Status: SHIPPED | OUTPATIENT
Start: 2023-02-11 | End: 2023-02-11

## 2023-02-11 RX ORDER — IOPAMIDOL 755 MG/ML
500 INJECTION, SOLUTION INTRAVASCULAR ONCE
Status: COMPLETED | OUTPATIENT
Start: 2023-02-11 | End: 2023-02-11

## 2023-02-11 RX ORDER — DICYCLOMINE HCL 20 MG
20 TABLET ORAL 2 TIMES DAILY
Qty: 20 TABLET | Refills: 0 | Status: ON HOLD | OUTPATIENT
Start: 2023-02-11 | End: 2024-05-13

## 2023-02-11 RX ORDER — POLYETHYLENE GLYCOL 3350 17 G/17G
1 POWDER, FOR SOLUTION ORAL 2 TIMES DAILY
Qty: 527 G | Refills: 0 | Status: SHIPPED | OUTPATIENT
Start: 2023-02-11

## 2023-02-11 RX ORDER — MORPHINE SULFATE 4 MG/ML
4 INJECTION, SOLUTION INTRAMUSCULAR; INTRAVENOUS ONCE
Status: COMPLETED | OUTPATIENT
Start: 2023-02-11 | End: 2023-02-11

## 2023-02-11 RX ORDER — ONDANSETRON 4 MG/1
4 TABLET, ORALLY DISINTEGRATING ORAL EVERY 8 HOURS PRN
Qty: 10 TABLET | Refills: 0 | Status: SHIPPED | OUTPATIENT
Start: 2023-02-11

## 2023-02-11 RX ORDER — KETOROLAC TROMETHAMINE 15 MG/ML
15 INJECTION, SOLUTION INTRAMUSCULAR; INTRAVENOUS ONCE
Status: COMPLETED | OUTPATIENT
Start: 2023-02-11 | End: 2023-02-11

## 2023-02-11 RX ORDER — DICYCLOMINE HCL 20 MG
20 TABLET ORAL 2 TIMES DAILY
Qty: 20 TABLET | Refills: 0 | Status: SHIPPED | OUTPATIENT
Start: 2023-02-11 | End: 2023-02-11

## 2023-02-11 RX ORDER — ONDANSETRON 4 MG/1
4 TABLET, ORALLY DISINTEGRATING ORAL EVERY 8 HOURS PRN
Qty: 10 TABLET | Refills: 0 | Status: SHIPPED | OUTPATIENT
Start: 2023-02-11 | End: 2023-02-11

## 2023-02-11 RX ORDER — ONDANSETRON 2 MG/ML
4 INJECTION INTRAMUSCULAR; INTRAVENOUS ONCE
Status: COMPLETED | OUTPATIENT
Start: 2023-02-11 | End: 2023-02-11

## 2023-02-11 RX ADMIN — ALUMINUM HYDROXIDE, MAGNESIUM HYDROXIDE, AND SIMETHICONE 30 ML: 200; 200; 20 SUSPENSION ORAL at 09:52

## 2023-02-11 RX ADMIN — DOCUSATE SODIUM 226 ML: 50 LIQUID ORAL at 11:45

## 2023-02-11 RX ADMIN — ONDANSETRON 4 MG: 2 INJECTION INTRAMUSCULAR; INTRAVENOUS at 08:41

## 2023-02-11 RX ADMIN — SODIUM CHLORIDE 64 ML: 9 INJECTION, SOLUTION INTRAVENOUS at 09:15

## 2023-02-11 RX ADMIN — KETOROLAC TROMETHAMINE 15 MG: 15 INJECTION, SOLUTION INTRAMUSCULAR; INTRAVENOUS at 09:51

## 2023-02-11 RX ADMIN — SODIUM CHLORIDE, POTASSIUM CHLORIDE, SODIUM LACTATE AND CALCIUM CHLORIDE 1000 ML: 600; 310; 30; 20 INJECTION, SOLUTION INTRAVENOUS at 08:43

## 2023-02-11 RX ADMIN — IOPAMIDOL 90 ML: 755 INJECTION, SOLUTION INTRAVENOUS at 09:15

## 2023-02-11 RX ADMIN — MORPHINE SULFATE 4 MG: 4 INJECTION INTRAVENOUS at 08:42

## 2023-02-11 ASSESSMENT — ENCOUNTER SYMPTOMS
WEAKNESS: 1
ABDOMINAL DISTENTION: 1
DYSURIA: 0
NAUSEA: 1
FREQUENCY: 0
ROS GI COMMENTS: DARK STOOL
DIARRHEA: 0
ABDOMINAL PAIN: 1
VOMITING: 1
HEMATURIA: 0
DIFFICULTY URINATING: 0

## 2023-02-11 ASSESSMENT — ACTIVITIES OF DAILY LIVING (ADL)
ADLS_ACUITY_SCORE: 35
ADLS_ACUITY_SCORE: 35

## 2023-02-11 NOTE — ED PROVIDER NOTES
History     Chief Complaint:  Abdominal Pain       HPI   Aishwarya Way is a 67 year old female with a history of DM type II and who presents with abdominal pain. The patient started to feel nauseous on Tuesday, and started to vomit later on. She has left sided abdominal pain. She denies blood or any black in her vomit. She feels weak. She denies diarrhea. She was seen at [facility] on 02/08/2023 and on 02/11/2023. She was diagnosed with constipation after her first visit, and was given laxatives. She went home afterwards, but the laxatives did not help much. She was given an enema after her second visit, which only helped somewhat. She noticed that her stool was black. She had peptobismol last Wednesday morning. She has abdominal distention. She denies any bladder related problems. She denies history of abdominal surgery. She only had water and pedialyte this morning. She denies vomiting today, but feels nauseous. She has not been able to take her diabetes medication for the past couple days due to her symptoms.    Review of External Notes:      ROS:  Review of Systems   Gastrointestinal: Positive for abdominal distention, abdominal pain, nausea and vomiting (no blood). Negative for diarrhea.        Dark stool   Genitourinary: Negative for decreased urine volume, difficulty urinating, dysuria, frequency, hematuria and urgency.   Neurological: Positive for weakness.   All other systems reviewed and are negative.    Allergies:  No Known Allergies     Medications:    aspirin   blood glucose monitoring meter device kit  blood glucose monitoring test strip  blood glucose monitoring lancets  cetirizine   gabapentin   hydrochlorothiazide  Hydrocodone-acetaminophen   indomethacin  mefloquine   metformin   oxycodone-acetaminophe  Prednisone   rosuvastatin   Jardiance   Latanoprost    Past Medical History:    Diabetes mellitus type II  Hyperlipidemia  Hypertension   DJD of shoulder  Vitamin D deficiency    Past Surgical  History:    Colonoscopy  D and C     Family History:    family history is not on file.    Social History:   reports that she has never smoked. She has never used smokeless tobacco. She reports that she does not drink alcohol and does not use drugs.  PCP: Sara Youngblood     Physical Exam     Patient Vitals for the past 24 hrs:   BP Temp Temp src Pulse Resp SpO2   02/11/23 0929 126/66 -- -- 77 -- --   02/11/23 0900 133/83 -- -- 81 -- 100 %   02/11/23 0845 119/80 -- -- 82 -- 99 %   02/11/23 0841 118/67 -- -- 81 -- --   02/11/23 0826 (!) 140/80 -- -- 88 -- --   02/11/23 0752 (!) 134/97 97.2  F (36.2  C) Temporal 106 16 98 %        Physical Exam  Gen: well appearing, in no acute distress  HENT:  mmm, no rhinorrhea  Eyes: periorbital tissues and sclera normal   Neck: supple, no abnormal swelling  Lungs:  CTAB,  no resp distress  CV: rrr, no m/r/g, ppi  Abd: soft, mild mid abdominal tenderness, nondistended, no rebound/masses/guarding/hsm  Ext: no peripheral edema  Skin: warm, dry, well perfused, no rashes/bruising/lesions on exposed skin  Neuro: alert, MAEE, no gross motor or sensory deficits, gait stable  Psych: Normal mood, normal affect      Emergency Department Course     Imaging:  Abd/pelvis CT,  IV  contrast only TRAUMA / AAA   Final Result   IMPRESSION:       No mechanical bowel obstruction or acute bowel inflammation. Normal appendix.        Report per radiology    Laboratory:  Labs Ordered and Resulted from Time of ED Arrival to Time of ED Departure   COMPREHENSIVE METABOLIC PANEL - Abnormal       Result Value    Sodium 135 (*)     Potassium 4.0      Chloride 96 (*)     Carbon Dioxide (CO2) 28      Anion Gap 11      Urea Nitrogen 10.2      Creatinine 0.83      Calcium 10.0      Glucose 133 (*)     Alkaline Phosphatase 68      AST 38 (*)     ALT 38 (*)     Protein Total 7.8      Albumin 4.5      Bilirubin Total 1.0      GFR Estimate 77     ROUTINE UA WITH MICROSCOPIC REFLEX TO CULTURE - Abnormal    Color Urine  Light Yellow      Appearance Urine Clear      Glucose Urine >=1000 (*)     Bilirubin Urine Negative      Ketones Urine Negative      Specific Gravity Urine 1.020      Blood Urine Negative      pH Urine 7.0      Protein Albumin Urine Negative      Urobilinogen Urine Normal      Nitrite Urine Negative      Leukocyte Esterase Urine Negative      RBC Urine 1      WBC Urine <1      Squamous Epithelials Urine 1     LIPASE - Normal    Lipase 24     LACTIC ACID WHOLE BLOOD - Normal    Lactic Acid 1.2     CBC WITH PLATELETS AND DIFFERENTIAL    WBC Count 6.8      RBC Count 4.56      Hemoglobin 13.6      Hematocrit 42.5      MCV 93      MCH 29.8      MCHC 32.0      RDW 14.5      Platelet Count 335      % Neutrophils 60      % Lymphocytes 32      % Monocytes 8      % Eosinophils 0      % Basophils 0      % Immature Granulocytes 0      NRBCs per 100 WBC 0      Absolute Neutrophils 4.0      Absolute Lymphocytes 2.2      Absolute Monocytes 0.6      Absolute Eosinophils 0.0      Absolute Basophils 0.0      Absolute Immature Granulocytes 0.0      Absolute NRBCs 0.0        Emergency Department Course & Assessments:    Interventions:  Medications   ondansetron (ZOFRAN) injection 4 mg (4 mg Intravenous Given 2/11/23 0841)   lactated ringers BOLUS 1,000 mL (0 mLs Intravenous Stopped 2/11/23 0943)   morphine (PF) injection 4 mg (4 mg Intravenous Given 2/11/23 0842)   iopamidol (ISOVUE-370) solution 500 mL (90 mLs Intravenous Given 2/11/23 0915)   CT scan flush (64 mLs Intravenous Given 2/11/23 0915)   ketorolac (TORADOL) injection 15 mg (15 mg Intravenous Given 2/11/23 0951)   lidocaine (viscous) (XYLOCAINE) 2 % 15 mL, alum & mag hydroxide-simethicone (MAALOX) 15 mL GI Cocktail (30 mLs Oral Given 2/11/23 0952)   Enema Compound (docusate/mineral oil/NaPhos) NO MAG CIT PREMIX (226 mLs Rectal Given 2/11/23 1145)      Consultations/Discussion of Management or Tests:  0800 I obtained history and examined patient.  1023 I rechecked the patient  and explained findings.    Disposition:  The patient was discharged to home.     Impression & Plan    Medical Decision Makin-year-old female here with abdominal pain and reported nausea and vomiting.  Nonbilious nonbloody.  Mild midline tenderness on examination.  Blood work is unremarkable, urinalysis without signs of infection.  CT scan without infectious surgical or vascular emergency.  She did want to try an enema to see if that would help with symptom relief.  Enema done she did have a small bowel movement.  She then requested to be discharged home which is certainly reasonable as she has been risk stratified low enough where she can be followed as an outpatient clinically.  We discussed what is known as well as what is unknown what to watch out for when to return here to the emergency department.    Diagnosis:    ICD-10-CM    1. Abdominal pain, generalized  R10.84       2. Nausea and vomiting, unspecified vomiting type  R11.2            Discharge Medications:  Discharge Medication List as of 2023 12:32 PM             Scribe Disclosure:  Jo LEUNG Hired, am serving as a scribe at 8:16 AM on 2023 to document services personally performed by Tanner Hernandez MD based on my observations and the provider's statements to me.    2023   Tanner Hernandez MD Walker, Jerome Richard, MD  [phone]

## 2023-02-11 NOTE — ED TRIAGE NOTES
Pt here with c/o abd pain x4 days. Vomiting on Tues. Seen at [facility] on Wed, dx with constipation. Reports dark stools. Takes NSAIDS daily for chronic back pain. Hx DM II, hasn't been taking med or checking BG. No vomiting blood. Not anticoagulated.      Triage Assessment     Row Name 02/11/23 0753       Triage Assessment (Adult)    Airway WDL WDL       Respiratory WDL    Respiratory WDL WDL       Skin Circulation/Temperature WDL    Skin Circulation/Temperature WDL WDL

## 2023-07-20 ENCOUNTER — HOSPITAL ENCOUNTER (EMERGENCY)
Facility: CLINIC | Age: 68
Discharge: LEFT BEFORE DISCHARGE | End: 2023-07-20
Payer: COMMERCIAL

## 2023-07-20 VITALS
RESPIRATION RATE: 16 BRPM | DIASTOLIC BLOOD PRESSURE: 72 MMHG | OXYGEN SATURATION: 98 % | HEART RATE: 84 BPM | TEMPERATURE: 97.1 F | SYSTOLIC BLOOD PRESSURE: 111 MMHG

## 2023-07-20 NOTE — ED TRIAGE NOTES
headache since last Thursday. Has tried tylenol and advil at home without improvement in pain. Gets headaches every once in a while.

## 2024-05-12 ENCOUNTER — APPOINTMENT (OUTPATIENT)
Dept: MRI IMAGING | Facility: CLINIC | Age: 69
End: 2024-05-12
Attending: EMERGENCY MEDICINE
Payer: COMMERCIAL

## 2024-05-12 ENCOUNTER — HOSPITAL ENCOUNTER (OUTPATIENT)
Facility: CLINIC | Age: 69
Setting detail: OBSERVATION
Discharge: HOME | End: 2024-05-14
Attending: EMERGENCY MEDICINE | Admitting: INTERNAL MEDICINE
Payer: COMMERCIAL

## 2024-05-12 DIAGNOSIS — E11.65 TYPE 2 DIABETES MELLITUS WITH HYPERGLYCEMIA, WITHOUT LONG-TERM CURRENT USE OF INSULIN (H): Primary | ICD-10-CM

## 2024-05-12 DIAGNOSIS — G93.41 METABOLIC ENCEPHALOPATHY: ICD-10-CM

## 2024-05-12 DIAGNOSIS — R73.9 HYPERGLYCEMIA: ICD-10-CM

## 2024-05-12 LAB
ALBUMIN SERPL BCG-MCNC: 4.3 G/DL (ref 3.5–5.2)
ALBUMIN UR-MCNC: NEGATIVE MG/DL
ALP SERPL-CCNC: 90 U/L (ref 40–150)
ALT SERPL W P-5'-P-CCNC: 16 U/L (ref 0–50)
ANION GAP SERPL CALCULATED.3IONS-SCNC: 10 MMOL/L (ref 7–15)
APPEARANCE UR: CLEAR
AST SERPL W P-5'-P-CCNC: 17 U/L (ref 0–45)
B-OH-BUTYR SERPL-SCNC: <0.18 MMOL/L
BASE EXCESS BLDV CALC-SCNC: 5.9 MMOL/L (ref -3–3)
BASOPHILS # BLD AUTO: 0 10E3/UL (ref 0–0.2)
BASOPHILS NFR BLD AUTO: 0 %
BILIRUB SERPL-MCNC: 0.6 MG/DL
BILIRUB UR QL STRIP: NEGATIVE
BUN SERPL-MCNC: 14.9 MG/DL (ref 8–23)
CALCIUM SERPL-MCNC: 9.7 MG/DL (ref 8.8–10.2)
CHLORIDE SERPL-SCNC: 90 MMOL/L (ref 98–107)
CK SERPL-CCNC: 167 U/L (ref 26–192)
COLOR UR AUTO: ABNORMAL
CREAT SERPL-MCNC: 0.75 MG/DL (ref 0.51–0.95)
DEPRECATED HCO3 PLAS-SCNC: 30 MMOL/L (ref 22–29)
EGFRCR SERPLBLD CKD-EPI 2021: 86 ML/MIN/1.73M2
EOSINOPHIL # BLD AUTO: 0 10E3/UL (ref 0–0.7)
EOSINOPHIL NFR BLD AUTO: 0 %
ERYTHROCYTE [DISTWIDTH] IN BLOOD BY AUTOMATED COUNT: 13.8 % (ref 10–15)
GLUCOSE BLDC GLUCOMTR-MCNC: 229 MG/DL (ref 70–99)
GLUCOSE BLDC GLUCOMTR-MCNC: 285 MG/DL (ref 70–99)
GLUCOSE BLDC GLUCOMTR-MCNC: 507 MG/DL (ref 70–99)
GLUCOSE BLDC GLUCOMTR-MCNC: >600 MG/DL (ref 70–99)
GLUCOSE SERPL-MCNC: 698 MG/DL (ref 70–99)
GLUCOSE UR STRIP-MCNC: >=1000 MG/DL
HCO3 BLDV-SCNC: 32 MMOL/L (ref 21–28)
HCT VFR BLD AUTO: 34.1 % (ref 35–47)
HGB BLD-MCNC: 11.2 G/DL (ref 11.7–15.7)
HGB UR QL STRIP: NEGATIVE
HOLD SPECIMEN: NORMAL
HOLD SPECIMEN: NORMAL
IMM GRANULOCYTES # BLD: 0 10E3/UL
IMM GRANULOCYTES NFR BLD: 0 %
KETONES UR STRIP-MCNC: NEGATIVE MG/DL
LEUKOCYTE ESTERASE UR QL STRIP: ABNORMAL
LYMPHOCYTES # BLD AUTO: 1.9 10E3/UL (ref 0.8–5.3)
LYMPHOCYTES NFR BLD AUTO: 36 %
MAGNESIUM SERPL-MCNC: 2 MG/DL (ref 1.7–2.3)
MCH RBC QN AUTO: 29.1 PG (ref 26.5–33)
MCHC RBC AUTO-ENTMCNC: 32.8 G/DL (ref 31.5–36.5)
MCV RBC AUTO: 89 FL (ref 78–100)
MONOCYTES # BLD AUTO: 0.5 10E3/UL (ref 0–1.3)
MONOCYTES NFR BLD AUTO: 9 %
NEUTROPHILS # BLD AUTO: 2.9 10E3/UL (ref 1.6–8.3)
NEUTROPHILS NFR BLD AUTO: 55 %
NITRATE UR QL: NEGATIVE
NRBC # BLD AUTO: 0 10E3/UL
NRBC BLD AUTO-RTO: 0 /100
O2/TOTAL GAS SETTING VFR VENT: 21 %
OXYHGB MFR BLDV: 73 % (ref 70–75)
PCO2 BLDV: 53 MM HG (ref 40–50)
PH BLDV: 7.39 [PH] (ref 7.32–7.43)
PH UR STRIP: 5 [PH] (ref 5–7)
PHOSPHATE SERPL-MCNC: 3.8 MG/DL (ref 2.5–4.5)
PLATELET # BLD AUTO: 275 10E3/UL (ref 150–450)
PO2 BLDV: 40 MM HG (ref 25–47)
POTASSIUM SERPL-SCNC: 4.2 MMOL/L (ref 3.4–5.3)
PROT SERPL-MCNC: 7.3 G/DL (ref 6.4–8.3)
RBC # BLD AUTO: 3.85 10E6/UL (ref 3.8–5.2)
RBC URINE: 1 /HPF
SAO2 % BLDV: 74 % (ref 70–75)
SODIUM SERPL-SCNC: 130 MMOL/L (ref 135–145)
SP GR UR STRIP: 1.03 (ref 1–1.03)
SQUAMOUS EPITHELIAL: <1 /HPF
TROPONIN T SERPL HS-MCNC: 11 NG/L
TSH SERPL DL<=0.005 MIU/L-ACNC: 1.18 UIU/ML (ref 0.3–4.2)
UROBILINOGEN UR STRIP-MCNC: NORMAL MG/DL
WBC # BLD AUTO: 5.3 10E3/UL (ref 4–11)
WBC URINE: 3 /HPF

## 2024-05-12 PROCEDURE — 96374 THER/PROPH/DIAG INJ IV PUSH: CPT | Mod: 59

## 2024-05-12 PROCEDURE — G0378 HOSPITAL OBSERVATION PER HR: HCPCS

## 2024-05-12 PROCEDURE — 82962 GLUCOSE BLOOD TEST: CPT

## 2024-05-12 PROCEDURE — 99285 EMERGENCY DEPT VISIT HI MDM: CPT | Mod: 25

## 2024-05-12 PROCEDURE — 70553 MRI BRAIN STEM W/O & W/DYE: CPT

## 2024-05-12 PROCEDURE — 83036 HEMOGLOBIN GLYCOSYLATED A1C: CPT | Performed by: INTERNAL MEDICINE

## 2024-05-12 PROCEDURE — 36415 COLL VENOUS BLD VENIPUNCTURE: CPT | Performed by: EMERGENCY MEDICINE

## 2024-05-12 PROCEDURE — 83735 ASSAY OF MAGNESIUM: CPT | Performed by: EMERGENCY MEDICINE

## 2024-05-12 PROCEDURE — 250N000013 HC RX MED GY IP 250 OP 250 PS 637: Performed by: EMERGENCY MEDICINE

## 2024-05-12 PROCEDURE — 81001 URINALYSIS AUTO W/SCOPE: CPT | Performed by: EMERGENCY MEDICINE

## 2024-05-12 PROCEDURE — 258N000003 HC RX IP 258 OP 636: Performed by: EMERGENCY MEDICINE

## 2024-05-12 PROCEDURE — 96361 HYDRATE IV INFUSION ADD-ON: CPT

## 2024-05-12 PROCEDURE — 85025 COMPLETE CBC W/AUTO DIFF WBC: CPT | Performed by: EMERGENCY MEDICINE

## 2024-05-12 PROCEDURE — 82805 BLOOD GASES W/O2 SATURATION: CPT | Performed by: EMERGENCY MEDICINE

## 2024-05-12 PROCEDURE — 99223 1ST HOSP IP/OBS HIGH 75: CPT | Performed by: INTERNAL MEDICINE

## 2024-05-12 PROCEDURE — 82010 KETONE BODYS QUAN: CPT | Performed by: EMERGENCY MEDICINE

## 2024-05-12 PROCEDURE — 82550 ASSAY OF CK (CPK): CPT | Performed by: INTERNAL MEDICINE

## 2024-05-12 PROCEDURE — 84100 ASSAY OF PHOSPHORUS: CPT | Performed by: EMERGENCY MEDICINE

## 2024-05-12 PROCEDURE — 255N000002 HC RX 255 OP 636: Performed by: EMERGENCY MEDICINE

## 2024-05-12 PROCEDURE — 82040 ASSAY OF SERUM ALBUMIN: CPT | Performed by: EMERGENCY MEDICINE

## 2024-05-12 PROCEDURE — 258N000003 HC RX IP 258 OP 636: Performed by: INTERNAL MEDICINE

## 2024-05-12 PROCEDURE — 93005 ELECTROCARDIOGRAM TRACING: CPT

## 2024-05-12 PROCEDURE — A9585 GADOBUTROL INJECTION: HCPCS | Performed by: EMERGENCY MEDICINE

## 2024-05-12 PROCEDURE — 84443 ASSAY THYROID STIM HORMONE: CPT | Performed by: EMERGENCY MEDICINE

## 2024-05-12 PROCEDURE — 70549 MR ANGIOGRAPH NECK W/O&W/DYE: CPT

## 2024-05-12 PROCEDURE — 84484 ASSAY OF TROPONIN QUANT: CPT | Performed by: EMERGENCY MEDICINE

## 2024-05-12 PROCEDURE — 250N000012 HC RX MED GY IP 250 OP 636 PS 637: Performed by: INTERNAL MEDICINE

## 2024-05-12 PROCEDURE — 70544 MR ANGIOGRAPHY HEAD W/O DYE: CPT

## 2024-05-12 PROCEDURE — 250N000012 HC RX MED GY IP 250 OP 636 PS 637: Performed by: EMERGENCY MEDICINE

## 2024-05-12 RX ORDER — NICOTINE POLACRILEX 4 MG
15-30 LOZENGE BUCCAL
Status: DISCONTINUED | OUTPATIENT
Start: 2024-05-12 | End: 2024-05-14 | Stop reason: HOSPADM

## 2024-05-12 RX ORDER — AMOXICILLIN 250 MG
1 CAPSULE ORAL 2 TIMES DAILY PRN
Status: DISCONTINUED | OUTPATIENT
Start: 2024-05-12 | End: 2024-05-14 | Stop reason: HOSPADM

## 2024-05-12 RX ORDER — AMOXICILLIN 250 MG
2 CAPSULE ORAL 2 TIMES DAILY PRN
Status: DISCONTINUED | OUTPATIENT
Start: 2024-05-12 | End: 2024-05-14 | Stop reason: HOSPADM

## 2024-05-12 RX ORDER — DEXTROSE MONOHYDRATE 25 G/50ML
25-50 INJECTION, SOLUTION INTRAVENOUS
Status: DISCONTINUED | OUTPATIENT
Start: 2024-05-12 | End: 2024-05-14 | Stop reason: HOSPADM

## 2024-05-12 RX ORDER — ACETAMINOPHEN 500 MG
1000 TABLET ORAL ONCE
Status: COMPLETED | OUTPATIENT
Start: 2024-05-12 | End: 2024-05-12

## 2024-05-12 RX ORDER — ONDANSETRON 2 MG/ML
4 INJECTION INTRAMUSCULAR; INTRAVENOUS EVERY 6 HOURS PRN
Status: DISCONTINUED | OUTPATIENT
Start: 2024-05-12 | End: 2024-05-14 | Stop reason: HOSPADM

## 2024-05-12 RX ORDER — ONDANSETRON 4 MG/1
4 TABLET, ORALLY DISINTEGRATING ORAL EVERY 6 HOURS PRN
Status: DISCONTINUED | OUTPATIENT
Start: 2024-05-12 | End: 2024-05-14 | Stop reason: HOSPADM

## 2024-05-12 RX ORDER — SODIUM CHLORIDE 9 MG/ML
INJECTION, SOLUTION INTRAVENOUS CONTINUOUS
Status: DISCONTINUED | OUTPATIENT
Start: 2024-05-12 | End: 2024-05-13

## 2024-05-12 RX ORDER — GADOBUTROL 604.72 MG/ML
10 INJECTION INTRAVENOUS ONCE
Status: COMPLETED | OUTPATIENT
Start: 2024-05-12 | End: 2024-05-12

## 2024-05-12 RX ORDER — ACETAMINOPHEN 325 MG/1
650 TABLET ORAL EVERY 4 HOURS PRN
Status: DISCONTINUED | OUTPATIENT
Start: 2024-05-12 | End: 2024-05-14 | Stop reason: HOSPADM

## 2024-05-12 RX ORDER — ACETAMINOPHEN 650 MG/1
650 SUPPOSITORY RECTAL EVERY 4 HOURS PRN
Status: DISCONTINUED | OUTPATIENT
Start: 2024-05-12 | End: 2024-05-14 | Stop reason: HOSPADM

## 2024-05-12 RX ADMIN — SODIUM CHLORIDE, POTASSIUM CHLORIDE, SODIUM LACTATE AND CALCIUM CHLORIDE 1000 ML: 600; 310; 30; 20 INJECTION, SOLUTION INTRAVENOUS at 19:32

## 2024-05-12 RX ADMIN — SODIUM CHLORIDE: 9 INJECTION, SOLUTION INTRAVENOUS at 23:16

## 2024-05-12 RX ADMIN — GADOBUTROL 10 ML: 604.72 INJECTION INTRAVENOUS at 17:39

## 2024-05-12 RX ADMIN — INSULIN ASPART 1 UNITS: 100 INJECTION, SOLUTION INTRAVENOUS; SUBCUTANEOUS at 23:34

## 2024-05-12 RX ADMIN — SODIUM CHLORIDE, POTASSIUM CHLORIDE, SODIUM LACTATE AND CALCIUM CHLORIDE 1000 ML: 600; 310; 30; 20 INJECTION, SOLUTION INTRAVENOUS at 17:02

## 2024-05-12 RX ADMIN — SODIUM CHLORIDE 10 UNITS: 9 INJECTION, SOLUTION INTRAVENOUS at 20:36

## 2024-05-12 RX ADMIN — ACETAMINOPHEN 1000 MG: 500 TABLET, FILM COATED ORAL at 16:54

## 2024-05-12 ASSESSMENT — COLUMBIA-SUICIDE SEVERITY RATING SCALE - C-SSRS
2. HAVE YOU ACTUALLY HAD ANY THOUGHTS OF KILLING YOURSELF IN THE PAST MONTH?: NO
6. HAVE YOU EVER DONE ANYTHING, STARTED TO DO ANYTHING, OR PREPARED TO DO ANYTHING TO END YOUR LIFE?: NO
1. IN THE PAST MONTH, HAVE YOU WISHED YOU WERE DEAD OR WISHED YOU COULD GO TO SLEEP AND NOT WAKE UP?: NO

## 2024-05-12 ASSESSMENT — ACTIVITIES OF DAILY LIVING (ADL)
ADLS_ACUITY_SCORE: 35

## 2024-05-12 NOTE — ED TRIAGE NOTES
"Pt states that on Friday she started to have a left sided headache that was ranked as 8/10. Pt also had the headache on Saturday and today \"my left hand is flailing, I am concerned about my blood sugar and I fee like I have a balance issue.\"     Pt did take tylenol, ibuprofen, and asa prior to arrival today.       "

## 2024-05-12 NOTE — ED PROVIDER NOTES
Emergency Department Note      History of Present Illness     Chief Complaint  Generalized Weakness    HPI  Aishwarya Way is a 68 year old female with a history of diabetes who presents due to generalized weakness. On Friday, 5/10/24, patient started developing pain on the left side of her head. She rates this at an 8/10. She had seen a chiropractor after this point, which helped briefly but would soon result in the re-onset of the head pain. This pain feels like a stabbing or screwing sensation. On Saturday, 5/11/24, patient was trying to read her blood sugar when she found it was in the 600s. She endorses weakness, mild numbness of the tips of her fingers, bilateral leg weakness, vision changes that cause cloudy vision, urinary frequency (starting 5/11/24), confusion, and instability. She denies nausea, emesis, sore throat, cough, burning urine, or abdominal pain. Patient used to use Jardiance but it caused an itching sensation so she was stopped from it. She has not taken any steroids recently. Patient took tylenol, ibuprofen, and aspirin early in the morning.     Independent Historian  None    Review of External Notes  Reviewed urgent care notes from visit earlier today when she was referred to the ER for further evaluation.  Past Medical History   Medical History and Problem List  Diabetes  Hyperlipidemia  Hypertension  Allergic conjunctivitis  Lumbar stenosis  Presbyopia  Astigmatism  DJD  Osteoarthritis  Obesity  Vitamin d deficiency    Medications  Baby aspirin  Jardiance  Diflucan  Amaryl  Cozaar  Minocycline  Prilosec  Zofran  Roxicodone  Senokot  neurontin    Surgical History   Past Surgical History:   Procedure Laterality Date    COLONOSCOPY      COLONOSCOPY  9/4/2012    Procedure: COLONOSCOPY;  Colonoscopy   ;  Surgeon: Mikhail Stevenson MD;  Location:  GI    COLONOSCOPY N/A 5/17/2016    Procedure: COLONOSCOPY;  Surgeon: Estuardo Juarez MD;  Location:  GI    GYN SURGERY      D & C  "    Physical Exam   Patient Vitals for the past 24 hrs:   BP Temp Temp src Pulse Resp SpO2 Height Weight   05/12/24 2310 -- -- -- -- -- 95 % -- --   05/12/24 2309 -- -- -- -- -- 99 % -- --   05/12/24 2308 -- -- -- -- -- 97 % -- --   05/12/24 2249 132/81 -- -- 72 -- -- -- --   05/12/24 2200 (!) 151/93 -- -- 77 -- -- -- --   05/12/24 2145 -- -- -- -- -- 99 % -- --   05/12/24 2115 -- -- -- -- -- 98 % -- --   05/12/24 1930 138/80 -- -- 79 -- -- -- --   05/12/24 1915 -- -- -- -- -- 100 % -- --   05/12/24 1900 (!) 165/85 -- -- 83 -- 100 % -- --   05/12/24 1835 (!) 154/85 -- -- 86 -- 99 % -- --   05/12/24 1730 122/62 -- -- 78 -- 96 % -- --   05/12/24 1720 136/75 -- -- 88 -- 98 % -- --   05/12/24 1710 -- -- -- -- -- 98 % -- --   05/12/24 1705 -- -- -- -- -- 97 % -- --   05/12/24 1603 127/77 98.5  F (36.9  C) Oral 100 20 97 % 1.651 m (5' 5\") 92.4 kg (203 lb 11.3 oz)     Physical Exam  Vitals and nursing note reviewed.   Constitutional:       General: She is not in acute distress.     Appearance: She is not ill-appearing.   HENT:      Head: Normocephalic and atraumatic.      Right Ear: External ear normal.      Left Ear: External ear normal.      Nose: Nose normal.      Mouth/Throat:      Mouth: Mucous membranes are dry.   Eyes:      Extraocular Movements: Extraocular movements intact.      Conjunctiva/sclera: Conjunctivae normal.   Cardiovascular:      Rate and Rhythm: Normal rate and regular rhythm.      Heart sounds: No murmur heard.  Pulmonary:      Effort: Pulmonary effort is normal. No respiratory distress.      Breath sounds: Normal breath sounds. No wheezing, rhonchi or rales.   Abdominal:      General: Abdomen is flat. Bowel sounds are normal. There is no distension.      Palpations: Abdomen is soft.      Tenderness: There is no abdominal tenderness. There is no guarding or rebound.   Musculoskeletal:         General: No deformity or signs of injury.      Cervical back: Normal range of motion and neck supple. "   Skin:     General: Skin is warm and dry.      Findings: No rash.   Neurological:      Mental Status: She is alert and oriented to person, place, and time.      Cranial Nerves: No cranial nerve deficit.      Sensory: No sensory deficit.      Motor: No weakness.   Psychiatric:         Behavior: Behavior normal.           Diagnostics   Lab Results   Labs Ordered and Resulted from Time of ED Arrival to Time of ED Departure   COMPREHENSIVE METABOLIC PANEL - Abnormal       Result Value    Sodium 130 (*)     Potassium 4.2      Carbon Dioxide (CO2) 30 (*)     Anion Gap 10      Urea Nitrogen 14.9      Creatinine 0.75      GFR Estimate 86      Calcium 9.7      Chloride 90 (*)     Glucose 698 (*)     Alkaline Phosphatase 90      AST 17      ALT 16      Protein Total 7.3      Albumin 4.3      Bilirubin Total 0.6     ROUTINE UA WITH MICROSCOPIC REFLEX TO CULTURE - Abnormal    Color Urine Light Yellow      Appearance Urine Clear      Glucose Urine >=1000 (*)     Bilirubin Urine Negative      Ketones Urine Negative      Specific Gravity Urine 1.031      Blood Urine Negative      pH Urine 5.0      Protein Albumin Urine Negative      Urobilinogen Urine Normal      Nitrite Urine Negative      Leukocyte Esterase Urine Trace (*)     RBC Urine 1      WBC Urine 3      Squamous Epithelials Urine <1     BLOOD GAS VENOUS - Abnormal    pH Venous 7.39      pCO2 Venous 53 (*)     pO2 Venous 40      Bicarbonate Venous 32 (*)     Base Excess/Deficit Venous 5.9 (*)     FIO2 21      Oxyhemoglobin Venous 73      O2 Sat, Venous 74.0     CBC WITH PLATELETS AND DIFFERENTIAL - Abnormal    WBC Count 5.3      RBC Count 3.85      Hemoglobin 11.2 (*)     Hematocrit 34.1 (*)     MCV 89      MCH 29.1      MCHC 32.8      RDW 13.8      Platelet Count 275      % Neutrophils 55      % Lymphocytes 36      % Monocytes 9      % Eosinophils 0      % Basophils 0      % Immature Granulocytes 0      NRBCs per 100 WBC 0      Absolute Neutrophils 2.9      Absolute  Lymphocytes 1.9      Absolute Monocytes 0.5      Absolute Eosinophils 0.0      Absolute Basophils 0.0      Absolute Immature Granulocytes 0.0      Absolute NRBCs 0.0     GLUCOSE BY METER - Abnormal    GLUCOSE BY METER POCT >600 (*)    GLUCOSE BY METER - Abnormal    GLUCOSE BY METER POCT 507 (*)    GLUCOSE BY METER - Abnormal    GLUCOSE BY METER POCT 285 (*)    GLUCOSE BY METER - Abnormal    GLUCOSE BY METER POCT 229 (*)    TROPONIN T, HIGH SENSITIVITY - Normal    Troponin T, High Sensitivity 11     MAGNESIUM - Normal    Magnesium 2.0     TSH WITH FREE T4 REFLEX - Normal    TSH 1.18     KETONE BETA-HYDROXYBUTYRATE QUANTITATIVE, RAPID - Normal    Ketone (Beta-Hydroxybutyrate) Quantitative <0.18     PHOSPHORUS - Normal    Phosphorus 3.8     CK TOTAL - Normal         LACTIC ACID WHOLE BLOOD   PHOSPHORUS   GLUCOSE MONITOR NURSING POCT   HEMOGLOBIN A1C       Imaging  MR Brain w/o & w Contrast   Final Result   IMPRESSION:   HEAD MRI:    1.  No acute intracranial process.   2.  Generalized brain atrophy and presumed microvascular ischemic changes as detailed above.      HEAD MRA:    1.  Normal MRA [place].      NECK MRA:   1.  No measurable stenosis or dissection.      MRA Angiogram Head w/o Contrast   Final Result   IMPRESSION:   HEAD MRI:    1.  No acute intracranial process.   2.  Generalized brain atrophy and presumed microvascular ischemic changes as detailed above.      HEAD MRA:    1.  Normal MRA [place].      NECK MRA:   1.  No measurable stenosis or dissection.      MRA Angiogram Neck w/o & w Contrast   Final Result   IMPRESSION:   HEAD MRI:    1.  No acute intracranial process.   2.  Generalized brain atrophy and presumed microvascular ischemic changes as detailed above.      HEAD MRA:    1.  Normal MRA [place].      NECK MRA:   1.  No measurable stenosis or dissection.      Report per radiology.    EKG   ECG taken at 1651, ECG read at 1700  Normal Sinus Rhythm  Moderate voltage  criteria for LVH, may be normal variant   Rate 95 bpm. NV interval 206 ms. QRS duration 94 ms. QT/QTc 374/469 ms. P-R-T axes 54 -28 44.  Read by: Benedict Riley MD    Independent Interpretation  None  ED Course    Medications Administered  Medications   senna-docusate (SENOKOT-S/PERICOLACE) 8.6-50 MG per tablet 1 tablet (has no administration in time range)     Or   senna-docusate (SENOKOT-S/PERICOLACE) 8.6-50 MG per tablet 2 tablet (has no administration in time range)   ondansetron (ZOFRAN ODT) ODT tab 4 mg (has no administration in time range)     Or   ondansetron (ZOFRAN) injection 4 mg (has no administration in time range)   sodium chloride 0.9 % infusion ( Intravenous $New Bag 5/12/24 2316)   acetaminophen (TYLENOL) tablet 650 mg (has no administration in time range)     Or   acetaminophen (TYLENOL) Suppository 650 mg (has no administration in time range)   glucose gel 15-30 g (has no administration in time range)     Or   dextrose 50 % injection 25-50 mL (has no administration in time range)     Or   glucagon injection 1 mg (has no administration in time range)   insulin aspart (NovoLOG) injection (RAPID ACTING) (has no administration in time range)   insulin aspart (NovoLOG) injection (RAPID ACTING) (1 Units Subcutaneous $Given 5/12/24 2334)   lactated ringers BOLUS 1,000 mL (0 mLs Intravenous Stopped 5/12/24 1802)   acetaminophen (TYLENOL) tablet 1,000 mg (1,000 mg Oral $Given 5/12/24 1654)   gadobutrol (GADAVIST) injection 10 mL (10 mLs Intravenous $Given 5/12/24 1739)   sodium chloride (PF) 0.9% PF flush 60 mL (60 mLs Intravenous $Given 5/12/24 1739)   lactated ringers BOLUS 1,000 mL (0 mLs Intravenous Stopped 5/12/24 2032)   insulin regular 1 unit/mL injection 10 Units (10 Units Intravenous $Given 5/12/24 2036)       Procedures  Procedures     Discussion of Management  Admitting Hospitalist, Dr. Hernandez    Social Determinants of Health adding to complexity of care  None    ED Course  ED Course as of  05/12/24 2357   Sun May 12, 2024   1621 I obtained history and examined the patient as noted above.     2004 I re-assessed patient.    2242 I spoke with Dr. Hernandez of the hospitalist team regarding the patient, who accepted the patient for admission.       Medical Decision Making / Diagnosis   CMS Diagnoses: None    MIPS     None    MDM  Aishwarya Way is a 68 year old female who presents with headache, general weakness, right upper extremity weakness, blurred vision.  She does not seem to have any deficits on exam in the ED.  Symptoms are certainly somewhat concerning for a stroke or TIA.  Less likely intracranial mass given her neurodeficits do not seem to be persistent.  Have low suspicion for intracranial bleed.  On arrival it was noted that she is severely hyperglycemic, so this could be contributing to symptoms as well.  She may be dehydrated, or less likely in DKA.  Could be an underlying infection or other etiology causing her hyperglycemia as well.  Workup was pursued as noted above.  Her lab work did show hyperglycemia but no signs of DKA or other complication.  Cardiac workup was reassuring as well.  MRI of the brain showed no signs of stroke or other vascular abnormality.  Patient was given 2 L of IV fluids as well as 10 units of IV insulin and her blood sugar did improve to the 280s.  Patient felt somewhat improved but still felt very weak and not at her baseline and she did not feel comfortable going home in this current state so I discussed with the hospitalist who will admit for observation tonight.    Disposition  The patient was admitted to the hospital under the care of Dr. Hernandez.     ICD-10 Codes:    ICD-10-CM    1. Hyperglycemia  R73.9       2. Metabolic encephalopathy  G93.41            Scribe Disclosure:  I, Vanessa Felipe, am serving as a scribe at 4:20 PM on 5/12/2024 to document services personally performed by Benedict Riley MD, based on my observations and the provider's  statements to me.     [employer]      Benedict Riley MD  05/13/24 0002

## 2024-05-13 LAB
ALBUMIN SERPL BCG-MCNC: 3.9 G/DL (ref 3.5–5.2)
ALP SERPL-CCNC: 71 U/L (ref 40–150)
ALT SERPL W P-5'-P-CCNC: 13 U/L (ref 0–50)
ANION GAP SERPL CALCULATED.3IONS-SCNC: 9 MMOL/L (ref 7–15)
AST SERPL W P-5'-P-CCNC: 14 U/L (ref 0–45)
ATRIAL RATE - MUSE: 95 BPM
BILIRUB SERPL-MCNC: 0.5 MG/DL
BUN SERPL-MCNC: 11.4 MG/DL (ref 8–23)
CALCIUM SERPL-MCNC: 9.4 MG/DL (ref 8.8–10.2)
CHLORIDE SERPL-SCNC: 101 MMOL/L (ref 98–107)
CREAT SERPL-MCNC: 0.66 MG/DL (ref 0.51–0.95)
DEPRECATED HCO3 PLAS-SCNC: 28 MMOL/L (ref 22–29)
DIASTOLIC BLOOD PRESSURE - MUSE: NORMAL MMHG
EGFRCR SERPLBLD CKD-EPI 2021: >90 ML/MIN/1.73M2
GLUCOSE BLDC GLUCOMTR-MCNC: 211 MG/DL (ref 70–99)
GLUCOSE BLDC GLUCOMTR-MCNC: 226 MG/DL (ref 70–99)
GLUCOSE BLDC GLUCOMTR-MCNC: 297 MG/DL (ref 70–99)
GLUCOSE BLDC GLUCOMTR-MCNC: 310 MG/DL (ref 70–99)
GLUCOSE BLDC GLUCOMTR-MCNC: 315 MG/DL (ref 70–99)
GLUCOSE BLDC GLUCOMTR-MCNC: 350 MG/DL (ref 70–99)
GLUCOSE BLDC GLUCOMTR-MCNC: 362 MG/DL (ref 70–99)
GLUCOSE SERPL-MCNC: 225 MG/DL (ref 70–99)
HBA1C MFR BLD: 12 %
INTERPRETATION ECG - MUSE: NORMAL
LACTATE SERPL-SCNC: 1.7 MMOL/L (ref 0.7–2)
P AXIS - MUSE: 54 DEGREES
PHOSPHATE SERPL-MCNC: 3.4 MG/DL (ref 2.5–4.5)
POTASSIUM SERPL-SCNC: 3.6 MMOL/L (ref 3.4–5.3)
PR INTERVAL - MUSE: 206 MS
PROT SERPL-MCNC: 6.6 G/DL (ref 6.4–8.3)
QRS DURATION - MUSE: 94 MS
QT - MUSE: 374 MS
QTC - MUSE: 469 MS
R AXIS - MUSE: -28 DEGREES
SODIUM SERPL-SCNC: 138 MMOL/L (ref 135–145)
SYSTOLIC BLOOD PRESSURE - MUSE: NORMAL MMHG
T AXIS - MUSE: 44 DEGREES
VENTRICULAR RATE- MUSE: 95 BPM

## 2024-05-13 PROCEDURE — 82962 GLUCOSE BLOOD TEST: CPT

## 2024-05-13 PROCEDURE — 258N000003 HC RX IP 258 OP 636: Performed by: INTERNAL MEDICINE

## 2024-05-13 PROCEDURE — 84100 ASSAY OF PHOSPHORUS: CPT | Performed by: INTERNAL MEDICINE

## 2024-05-13 PROCEDURE — 250N000013 HC RX MED GY IP 250 OP 250 PS 637: Performed by: INTERNAL MEDICINE

## 2024-05-13 PROCEDURE — 250N000013 HC RX MED GY IP 250 OP 250 PS 637: Performed by: PHYSICIAN ASSISTANT

## 2024-05-13 PROCEDURE — G0378 HOSPITAL OBSERVATION PER HR: HCPCS

## 2024-05-13 PROCEDURE — 99232 SBSQ HOSP IP/OBS MODERATE 35: CPT | Performed by: PHYSICIAN ASSISTANT

## 2024-05-13 PROCEDURE — 83605 ASSAY OF LACTIC ACID: CPT | Performed by: INTERNAL MEDICINE

## 2024-05-13 PROCEDURE — 36415 COLL VENOUS BLD VENIPUNCTURE: CPT | Performed by: INTERNAL MEDICINE

## 2024-05-13 PROCEDURE — 80053 COMPREHEN METABOLIC PANEL: CPT | Performed by: INTERNAL MEDICINE

## 2024-05-13 PROCEDURE — 999N000147 HC STATISTIC PT IP EVAL DEFER: Performed by: PHYSICAL THERAPIST

## 2024-05-13 PROCEDURE — 250N000012 HC RX MED GY IP 250 OP 636 PS 637: Performed by: PHYSICIAN ASSISTANT

## 2024-05-13 RX ORDER — LATANOPROST 50 UG/ML
1 SOLUTION/ DROPS OPHTHALMIC DAILY
COMMUNITY

## 2024-05-13 RX ORDER — ONDANSETRON 4 MG/1
4 TABLET, ORALLY DISINTEGRATING ORAL EVERY 8 HOURS PRN
Status: DISCONTINUED | OUTPATIENT
Start: 2024-05-13 | End: 2024-05-14 | Stop reason: HOSPADM

## 2024-05-13 RX ORDER — GLIMEPIRIDE 1 MG/1
2 TABLET ORAL
Status: DISCONTINUED | OUTPATIENT
Start: 2024-05-13 | End: 2024-05-14 | Stop reason: HOSPADM

## 2024-05-13 RX ORDER — GABAPENTIN 300 MG/1
300 CAPSULE ORAL DAILY PRN
Status: DISCONTINUED | OUTPATIENT
Start: 2024-05-13 | End: 2024-05-14 | Stop reason: HOSPADM

## 2024-05-13 RX ORDER — GABAPENTIN 300 MG/1
300 CAPSULE ORAL DAILY PRN
COMMUNITY

## 2024-05-13 RX ORDER — NAPROXEN SODIUM 220 MG
220 TABLET ORAL 2 TIMES DAILY PRN
COMMUNITY

## 2024-05-13 RX ORDER — POLYETHYLENE GLYCOL 3350 17 G/17G
17 POWDER, FOR SOLUTION ORAL 2 TIMES DAILY
Status: DISCONTINUED | OUTPATIENT
Start: 2024-05-13 | End: 2024-05-14 | Stop reason: HOSPADM

## 2024-05-13 RX ORDER — LOSARTAN POTASSIUM 100 MG/1
100 TABLET ORAL DAILY
COMMUNITY

## 2024-05-13 RX ORDER — LOSARTAN POTASSIUM 100 MG/1
100 TABLET ORAL DAILY
Status: DISCONTINUED | OUTPATIENT
Start: 2024-05-13 | End: 2024-05-14 | Stop reason: HOSPADM

## 2024-05-13 RX ORDER — HYDROCHLOROTHIAZIDE 12.5 MG/1
12.5 CAPSULE ORAL DAILY
Status: DISCONTINUED | OUTPATIENT
Start: 2024-05-13 | End: 2024-05-14 | Stop reason: HOSPADM

## 2024-05-13 RX ORDER — FLUTICASONE PROPIONATE 50 MCG
1 SPRAY, SUSPENSION (ML) NASAL DAILY PRN
COMMUNITY

## 2024-05-13 RX ORDER — DIPHENHYDRAMINE HCL 25 MG
25 CAPSULE ORAL EVERY 6 HOURS PRN
Status: DISCONTINUED | OUTPATIENT
Start: 2024-05-13 | End: 2024-05-14 | Stop reason: HOSPADM

## 2024-05-13 RX ORDER — GLIMEPIRIDE 1 MG/1
2 TABLET ORAL
COMMUNITY

## 2024-05-13 RX ORDER — CETIRIZINE HYDROCHLORIDE 5 MG/1
5 TABLET ORAL DAILY PRN
COMMUNITY

## 2024-05-13 RX ORDER — ASPIRIN 325 MG
325 TABLET ORAL EVERY OTHER DAY
Status: DISCONTINUED | OUTPATIENT
Start: 2024-05-13 | End: 2024-05-14 | Stop reason: HOSPADM

## 2024-05-13 RX ORDER — OLOPATADINE HYDROCHLORIDE 1 MG/ML
1 SOLUTION/ DROPS OPHTHALMIC 2 TIMES DAILY PRN
COMMUNITY

## 2024-05-13 RX ADMIN — DIPHENHYDRAMINE HYDROCHLORIDE 25 MG: 25 CAPSULE ORAL at 02:19

## 2024-05-13 RX ADMIN — ACETAMINOPHEN 650 MG: 325 TABLET, FILM COATED ORAL at 09:16

## 2024-05-13 RX ADMIN — SENNOSIDES AND DOCUSATE SODIUM 2 TABLET: 50; 8.6 TABLET ORAL at 12:08

## 2024-05-13 RX ADMIN — INSULIN GLARGINE 16 UNITS: 100 INJECTION, SOLUTION SUBCUTANEOUS at 11:43

## 2024-05-13 RX ADMIN — SODIUM CHLORIDE: 9 INJECTION, SOLUTION INTRAVENOUS at 16:09

## 2024-05-13 RX ADMIN — ACETAMINOPHEN 650 MG: 325 TABLET, FILM COATED ORAL at 14:09

## 2024-05-13 RX ADMIN — ASPIRIN 325 MG ORAL TABLET 325 MG: 325 PILL ORAL at 14:09

## 2024-05-13 RX ADMIN — GLIMEPIRIDE 2 MG: 1 TABLET ORAL at 17:07

## 2024-05-13 RX ADMIN — HYDROCHLOROTHIAZIDE 12.5 MG: 12.5 CAPSULE ORAL at 14:09

## 2024-05-13 RX ADMIN — SODIUM CHLORIDE: 9 INJECTION, SOLUTION INTRAVENOUS at 06:18

## 2024-05-13 RX ADMIN — LOSARTAN POTASSIUM 100 MG: 100 TABLET, FILM COATED ORAL at 14:09

## 2024-05-13 ASSESSMENT — ACTIVITIES OF DAILY LIVING (ADL)
ADLS_ACUITY_SCORE: 36
ADLS_ACUITY_SCORE: 36
ADLS_ACUITY_SCORE: 34
ADLS_ACUITY_SCORE: 32
ADLS_ACUITY_SCORE: 36
ADLS_ACUITY_SCORE: 34
ADLS_ACUITY_SCORE: 36
ADLS_ACUITY_SCORE: 34
ADLS_ACUITY_SCORE: 34
ADLS_ACUITY_SCORE: 32
ADLS_ACUITY_SCORE: 34
ADLS_ACUITY_SCORE: 32
ADLS_ACUITY_SCORE: 34

## 2024-05-13 NOTE — PLAN OF CARE
"BP (!) 155/88 (BP Location: Left arm)   Pulse 76   Temp 98.3  F (36.8  C) (Oral)   Resp 18   Ht 1.651 m (5' 5\")   Wt 90.1 kg (198 lb 11.2 oz)   SpO2 98%   BMI 33.07 kg/m      PRIMARY DIAGNOSIS: HYPERGLYCEMIA, Metabolic Acidosis    OUTPATIENT/OBSERVATION GOALS TO BE MET BEFORE DISCHARGE  BG greater than 100 and less than 250 on two consecutive readings: No  Recent Labs   Lab Test 05/13/24  1402 05/13/24  1134 05/13/24  0655   * 297* 225*       Ketones absent from urine  (hyperglycemia): Yes    Tolerating oral intake to maintain hydration: Yes    Return to near baseline physical activity: No    Discharge Planner Nurse   Safe discharge environment identified: Yes  Barriers to discharge: Yes       Entered by: Juani Ramírez RN 05/13/2024 8AM    Patient is A&Ox4 and able to make needs known. Patient is SBA for ambulation, managing IV pole independently. Denies dizziness, and blurred vision. Blood pressure elevated. Blood sugars have remained elevated as well - received correction doses overnight. This AM BG was 225 and received 2 units of Novolog. 100ml/hr NaCl infusing. Will continue to work with patient on controlling BG readings.  Please review provider order for any additional goals.   Nurse to notify provider when observation goals have been met and patient is ready for discharge.      Problem: Adult Inpatient Plan of Care  Goal: Plan of Care Review  Description: The Plan of Care Review/Shift note should be completed every shift.  The Outcome Evaluation is a brief statement about your assessment that the patient is improving, declining, or no change.  This information will be displayed automatically on your shift  note.  Outcome: Progressing  Flowsheets (Taken 5/13/2024 0800)  Outcome Evaluation: BG still elevated, but improvement in dizziness and blurred vision previously reported  Plan of Care Reviewed With: patient  Overall Patient Progress: improving  Goal: Patient-Specific Goal " "(Individualized)  Description: You can add care plan individualizations to a care plan. Examples of Individualization might be:  \"Parent requests to be called daily at 9am for status\", \"I have a hard time hearing out of my right ear\", or \"Do not touch me to wake me up as it startles  me\".  Outcome: Progressing  Goal: Absence of Hospital-Acquired Illness or Injury  Outcome: Progressing  Intervention: Identify and Manage Fall Risk  Recent Flowsheet Documentation  Taken 5/13/2024 0800 by Juani Ramírez, RN  Safety Promotion/Fall Prevention:   activity supervised   room organization consistent   safety round/check completed   nonskid shoes/slippers when out of bed   lighting adjusted  Intervention: Prevent Skin Injury  Recent Flowsheet Documentation  Taken 5/13/2024 0908 by Juani Ramírez, RN  Body Position: position changed independently  Taken 5/13/2024 0800 by Juani Ramírez, RN  Body Position: position changed independently  Intervention: Prevent and Manage VTE (Venous Thromboembolism) Risk  Recent Flowsheet Documentation  Taken 5/13/2024 0800 by Juani Ramírez, RN  VTE Prevention/Management: SCDs (sequential compression devices) off  Intervention: Prevent Infection  Recent Flowsheet Documentation  Taken 5/13/2024 0800 by Juani Ramírez, RN  Infection Prevention: rest/sleep promoted  Goal: Optimal Comfort and Wellbeing  Outcome: Progressing  Goal: Readiness for Transition of Care  Outcome: Progressing     Goal Outcome Evaluation:      Plan of Care Reviewed With: patient    Overall Patient Progress: improvingOverall Patient Progress: improving    Outcome Evaluation: BG still elevated, but improvement in dizziness and blurred vision previously reported      "

## 2024-05-13 NOTE — PLAN OF CARE
Goal Outcome Evaluation:      Plan of Care Reviewed With: patient    Overall Patient Progress: no changeOverall Patient Progress: no change    Outcome Evaluation: 0200 .    PRIMARY DIAGNOSIS: HYPO/HYPERGLYCEMIA    OUTPATIENT/OBSERVATION GOALS TO BE MET BEFORE DISCHARGE  BG greater than 100 and less than 250 on two consecutive readings: Yes  Recent Labs   Lab Test 05/13/24  0204 05/12/24  2329 05/12/24  2122   * 229* 285*       Ketones absent from urine  (hyperglycemia): Yes    Tolerating oral intake to maintain hydration: Yes    Return to near baseline physical activity: Yes    Discharge Planner Nurse   Safe discharge environment identified: Yes  Barriers to discharge: Yes       Entered by: Camila Huang RN 05/13/2024 3:12 AM   Cross coverage paged about 0200 BG being 362. Ordered additional insulin and will recheck in two hours. Recheck at 0500 was 310, cross coverage notified ordered additional insulin and will recheck in 2 hours, recheck at 0700 was 226. NS@100. Will continue with plan of care.   Please review provider order for any additional goals.   Nurse to notify provider when observation goals have been met and patient is ready for discharge.

## 2024-05-13 NOTE — PLAN OF CARE
"Goal Outcome Evaluation:      Plan of Care Reviewed With: patient    Overall Patient Progress: no changeOverall Patient Progress: no change    Outcome Evaluation: NS@100.    PRIMARY DIAGNOSIS: GENERALIZED WEAKNESS    OUTPATIENT/OBSERVATION GOALS TO BE MET BEFORE DISCHARGE  1. Orthostatic performed: No    2. Tolerating PO medications: Yes    3. Return to near baseline physical activity: Yes    4. Cleared for discharge by consultants (if involved): N/A    Discharge Planner Nurse   Safe discharge environment identified: Yes  Barriers to discharge: Yes, elevated BG.        Entered by: Camila Huang RN 05/13/2024 3:10 AM     Please review provider order for any additional goals.   Nurse to notify provider when observation goals have been met and patient is ready for discharge.      Problem: Adult Inpatient Plan of Care  Goal: Plan of Care Review  Description: The Plan of Care Review/Shift note should be completed every shift.  The Outcome Evaluation is a brief statement about your assessment that the patient is improving, declining, or no change.  This information will be displayed automatically on your shift  note.  Outcome: Not Progressing  Flowsheets (Taken 5/13/2024 0309)  Outcome Evaluation: NS@100.  Plan of Care Reviewed With: patient  Overall Patient Progress: no change  Goal: Patient-Specific Goal (Individualized)  Description: You can add care plan individualizations to a care plan. Examples of Individualization might be:  \"Parent requests to be called daily at 9am for status\", \"I have a hard time hearing out of my right ear\", or \"Do not touch me to wake me up as it startles  me\".  Outcome: Not Progressing  Goal: Absence of Hospital-Acquired Illness or Injury  Outcome: Not Progressing  Goal: Optimal Comfort and Wellbeing  Outcome: Not Progressing  Goal: Readiness for Transition of Care  Outcome: Not Progressing     Problem: Comorbidity Management  Goal: Blood Glucose Levels Within Targeted Range  Outcome: Not " Progressing

## 2024-05-13 NOTE — DISCHARGE SUMMARY
[facility]  Discharge Summary        Aishwarya Way MRN# [id number]   YOB: 1955 Age: 68 year old     Date of Admission:  5/12/2024  Date of Discharge:  5/13/2024  Admitting Physician:  Maty Hernandez MD  Discharge Physician: Ivana Waterman PA-C  Discharging Service: Hospitalist     Primary Provider: Sara Youngblood  Primary Care Physician Phone Number: [phone]         Discharge Diagnoses/Problem Oriented Hospital Course (Providers):      Aishwarya Way was admitted on 5/12/2024 by Maty Hernandez MD and I would refer you to their history and physical.  The following problems were addressed during her hospitalization:    Uncontrolled DM - hyperosmolar hyperglycemic state HgbA1c 12   HTN   Weakness, HA, blurred vision, paraesthesia - due to #1 Symptoms now all resolved     Chronic medical illness:  Diabetes mellitus  Hypertension.   Dyslipidemia  Cervical radiculopathy         Code Status:      Full Code        Brief Hospital Stay Summary Sent Home With Patient in AVS:          Reason for your hospital stay      You were admitted for concerns of headache, dizziness, blurred vision in   the setting of extremely high blood sugar. You underwent MRI imaging of   the brain that did not reveal any evidence of stroke or acute abnormality.    Your symptoms are likely related to elevated blood sugar.  This has since   improved with hydration initiation of insulin.  In the short-term you need   to continue on your regular diabetic medication including metformin and   glimepiride.  You will need to start on insulin 16 units daily before   breakfast. Check your blood sugar before meals and before bedtime and   record these numbers for Dr. Youngblood when you see her for follow up.        Aishwarya Way is a 68 year old female who has a past medical history of obesity, hypertension, dyslipidemia, cervical radiculopathy, type 2 diabetes mellitus with last A1c of 8.4 in November  2023.  Patient presented to the emergency department with complaints of weakness and concerns about blood sugar.  She was noted to have hyperglycemia with blood sugar at about 600.  She was given couple liters of normal saline had an extensive stroke workup done and received couple of liters of crystalloid and 10 units of regular insulin IV.  Did respond appropriately to these and her blood sugar was in the 280 range however she felt extremely weak and unable to walk and make it independently hence she has been requested for admission under observation status for weakness.  Blood work has revealed her to have no evidence of acidosis, no anion gap.  Her mentation is appropriate.    Patient registered observation.  She was given IV fluids as well as aspart insulin with improvement of her blood sugar down to the low 200s.  She also underwent MRI/MRA of the brain and neck due to complaints of dizziness and headache which was all unremarkable.  Her symptoms did resolve after her blood sugar was corrected.  In regards to her hemoglobin A1c addition to 12 which is surprising for the patient.  She had discontinued the Jardiance due to side effects of itchiness but was only maintained on metformin and glimepiride.  She would likely benefit from something like Trulicity or alternative that PCP needs to titrate.  In the short-term we will discharge her with once daily Lantus dosing at 16 units.  She will need to check her blood sugar before every meal and at bedtime and keep a diary to bring to her PCP.  Further changes to her diabetic regimen or titration will need to be followed at that time.         Important Results:        Recent Labs   Lab 05/12/24  1703   WBC 5.3   HGB 11.2*   HCT 34.1*   MCV 89        Recent Labs   Lab 05/13/24  0655 05/13/24  0654 05/13/24  0502 05/12/24  1928 05/12/24  1703     --   --   --  130*   POTASSIUM 3.6  --   --   --  4.2   CHLORIDE 101  --   --   --  90*   CO2 28  --   --   --   30*   ANIONGAP 9  --   --   --  10   * 226* 310*   < > 698*   BUN 11.4  --   --   --  14.9   CR 0.66  --   --   --  0.75   GFRESTIMATED >90  --   --   --  86   MARGARET 9.4  --   --   --  9.7    < > = values in this interval not displayed.     7-Day Micro Results       No results found for the last 168 hours.          Recent Labs   Lab 05/13/24  0655 05/13/24  0654 05/13/24  0502 05/13/24  0204 05/12/24  2329   * 226* 310* 362* 229*              Pending Results:        Unresulted Labs Ordered in the Past 30 Days of this Admission       No orders found for last 31 day(s).              Discharge Instructions and Follow-Up:      Follow-up Appointments     Follow-up and recommended labs and tests       Follow up with primary care provider, Sara Youngblood, within 7 days   for hospital follow- up.  The following labs/tests are recommended: BG   evaluation and follow up.              Discharge Disposition:        Discharged to home         Discharge Medications:        Current Discharge Medication List        START taking these medications    Details   insulin glargine (LANTUS PEN) 100 UNIT/ML pen Inject 16 Units Subcutaneous every morning (before breakfast)  Qty: 15 mL, Refills: 1    Comments: If Lantus is not covered by insurance, may substitute Basaglar or Semglee or other insulin glargine product per insurance preference at same dose and frequency.    Associated Diagnoses: Type 2 diabetes mellitus with hyperglycemia, without long-term current use of insulin (H)           CONTINUE these medications which have NOT CHANGED    Details   aspirin 325 MG tablet Take 1 tablet by mouth every other day      cetirizine (ZYRTEC) 5 MG tablet Take 5 mg by mouth daily as needed for allergies (seasonal allergies)      fluticasone (FLONASE) 50 MCG/ACT nasal spray Spray 1 spray into both nostrils daily as needed for allergies (seasonal allergies)      gabapentin (NEURONTIN) 300 MG capsule Take 300 mg by mouth daily as needed  for neuropathic pain      glimepiride (AMARYL) 1 MG tablet Take 2 mg by mouth 2 times daily (before meals)      hydrochlorothiazide (MICROZIDE) 12.5 MG capsule Take 1 capsule (12.5 mg) by mouth daily Last refill the pt needs an appt with her provider  Qty: 30 capsule, Refills: 0    Associated Diagnoses: Hypertension goal BP (blood pressure) < 140/90      latanoprost (XALATAN) 0.005 % ophthalmic solution Place 1 drop into both eyes daily      losartan (COZAAR) 100 MG tablet Take 100 mg by mouth daily      metFORMIN (GLUCOPHAGE) 1000 MG tablet Take 1,000 mg by mouth 2 times daily (with meals)      Multiple Vitamins-Iron (MULTIVITAMIN/IRON PO) Take 1 tablet by mouth daily      naproxen sodium (ANAPROX) 220 MG tablet Take 220 mg by mouth 2 times daily as needed for moderate pain      olopatadine (PATANOL) 0.1 % ophthalmic solution Place 1 drop into both eyes 2 times daily as needed for allergies (seasonal allergies)      ondansetron (ZOFRAN ODT) 4 MG ODT tab Take 1 tablet (4 mg) by mouth every 8 hours as needed for nausea or vomiting  Qty: 10 tablet, Refills: 0      polyethylene glycol (MIRALAX) 17 GM/Dose powder Take 17 g (1 capful) by mouth 2 times daily  Qty: 527 g, Refills: 0      rosuvastatin (CRESTOR) 20 MG tablet Take 1 tablet (20 mg) by mouth daily  Qty: 90 tablet, Refills: 2    Associated Diagnoses: Hyperlipidemia LDL goal <100; Type 2 diabetes mellitus without complication (H)      blood glucose monitoring (NO BRAND SPECIFIED) meter device kit 1 kit by [employer] route daily  Qty: 1 kit, Refills: 0    Comments: Please dispense per insurance coverage  Associated Diagnoses: Type 2 diabetes, HbA1c goal < 7% (H)      blood glucose monitoring (NO BRAND SPECIFIED) test strip Use to test blood sugar 1 times daily or as directed.  Qty: 100 each, Refills: 0    Comments: Please dispense per patient's monitor  Associated Diagnoses: Type 2 diabetes, HbA1c goal < 7% (H)      blood glucose monitoring (ULTRA THIN 30G) lancets  "Use to test blood sugar 1 times daily or as directed.  Qty: 3 Box, Refills: 0    Associated Diagnoses: Type 2 diabetes, HbA1c goal < 7% (H)      mefloquine (LARIAM) 250 MG tablet Take 1 tablet (250 mg) by mouth every 7 days Start 2 weeks prior to trip  And continue for 4 weeks after returning home.  Qty: 9 tablet, Refills: 0    Associated Diagnoses: Travel advice encounter               Allergies:       No Known Allergies        Consultations This Hospital Stay:        No consultations were requested during this admission         Condition and Physical on Discharge:        Discharge condition: Stable   Vitals: Blood pressure (!) 165/98, pulse 74, temperature 98.4  F (36.9  C), temperature source Oral, resp. rate 20, height 1.651 m (5' 5\"), weight 90.1 kg (198 lb 11.2 oz), SpO2 98%.  198 lbs 11.2 oz      GENERAL:  Comfortable.  PSYCH: pleasant, oriented, No acute distress.  HEENT:  PERRLA. Normal conjunctiva, normal hearing, nasal mucosa and Oropharynx are normal.  NECK:  Supple, no neck vein distention, adenopathy or bruits, normal thyroid.  HEART:  Normal S1, S2 with no murmur, no pericardial rub, gallops or S3 or S4.  LUNGS:  Clear to auscultation, normal Respiratory effort. No wheezing, rales or ronchi.  ABDOMEN:  Soft, no hepatosplenomegaly, normal bowel sounds. Non-tender, non distended.   EXTREMITIES:  No pedal edema, +2 pulses bilateral and equal.  SKIN:  Dry to touch, No rash, wound or ulcerations.  NEUROLOGIC:  CN 2-12 intact, BL 5/5 symmetric upper and lower extremity strength, sensation is intact with no focal deficits.           Discharge Time:      > 30 mins         Image Results From This Hospital Stay (For Non-EPIC Providers):        Results for orders placed or performed during the hospital encounter of 05/12/24   MR Brain w/o & w Contrast    Narrative    EXAM: MR BRAIN W/O and W CONTRAST, MRA NECK (CAROTIDS) W/O and W CONTRAST, MRA BRAIN ([place]) W/O CONTRAST  LOC[facility]: [facility]  DATE: 5/12/2024    INDICATION: HA, dizziness, vision changes, right arm weakness.  COMPARISON: 9/17/2004  CONTRAST: 10mL Gadavist  TECHNIQUE:   1) Routine multiplanar multisequence head MRI without and with intravenous contrast.  2) 3D time-of-flight head MRA without intravenous contrast.  3) Neck MRA without and with IV contrast. Stenosis measurements made according to NASCET criteria unless otherwise specified.    FINDINGS:  HEAD MRI:  INTRACRANIAL CONTENTS: No acute or subacute infarct. No mass, acute hemorrhage, or extra-axial fluid collections. Scattered nonspecific T2/FLAIR hyperintensities within the cerebral white matter most consistent with mild chronic microvascular ischemic   change. Mild to moderate generalized cerebral atrophy. No hydrocephalus. Normal position of the cerebellar tonsils. No pathologic contrast enhancement.    SELLA: Empty sella morphology with flattening of the pituitary gland along the sellar floor.    OSSEOUS STRUCTURES/SOFT TISSUES: Normal marrow signal. The major intracranial vascular flow voids are maintained.     ORBITS: No abnormality accounting for technique.     SINUSES/MASTOIDS: No paranasal sinus mucosal disease. No middle ear or mastoid effusion.     HEAD MRA:   ANTERIOR CIRCULATION: No stenosis/occlusion, aneurysm, or high flow vascular malformation. Standard [place] anatomy.    POSTERIOR CIRCULATION: No stenosis/occlusion, aneurysm, or high flow vascular malformation. Balanced vertebral arteries supply a normal basilar artery.     NECK MRA:   RIGHT CAROTID: Mild irregularity. No measurable stenosis or dissection.    LEFT CAROTID: No measurable stenosis or dissection.    VERTEBRAL ARTERIES: No focal stenosis or dissection. Dominant right and smaller left vertebral arteries.    AORTIC ARCH: [place]ovine origin left common carotid artery. No significant stenosis at the origin of the great vessels.      Impression    IMPRESSION:  HEAD MRI:   1.  No acute  intracranial process.  2.  Generalized brain atrophy and presumed microvascular ischemic changes as detailed above.    HEAD MRA:   1.  Normal MRA [place].    NECK MRA:  1.  No measurable stenosis or dissection.   MRA Angiogram Head w/o Contrast    Narrative    EXAM: MR BRAIN W/O and W CONTRAST, MRA NECK (CAROTIDS) W/O and W CONTRAST, MRA BRAIN ([place]) W/O CONTRAST  LOCATION: [facility]  DATE: 5/12/2024    INDICATION: HA, dizziness, vision changes, right arm weakness.  COMPARISON: 9/17/2004  CONTRAST: 10mL Gadavist  TECHNIQUE:   1) Routine multiplanar multisequence head MRI without and with intravenous contrast.  2) 3D time-of-flight head MRA without intravenous contrast.  3) Neck MRA without and with IV contrast. Stenosis measurements made according to NASCET criteria unless otherwise specified.    FINDINGS:  HEAD MRI:  INTRACRANIAL CONTENTS: No acute or subacute infarct. No mass, acute hemorrhage, or extra-axial fluid collections. Scattered nonspecific T2/FLAIR hyperintensities within the cerebral white matter most consistent with mild chronic microvascular ischemic   change. Mild to moderate generalized cerebral atrophy. No hydrocephalus. Normal position of the cerebellar tonsils. No pathologic contrast enhancement.    SELLA: Empty sella morphology with flattening of the pituitary gland along the sellar floor.    OSSEOUS STRUCTURES/SOFT TISSUES: Normal marrow signal. The major intracranial vascular flow voids are maintained.     ORBITS: No abnormality accounting for technique.     SINUSES/MASTOIDS: No paranasal sinus mucosal disease. No middle ear or mastoid effusion.     HEAD MRA:   ANTERIOR CIRCULATION: No stenosis/occlusion, aneurysm, or high flow vascular malformation. Standard [place] anatomy.    POSTERIOR CIRCULATION: No stenosis/occlusion, aneurysm, or high flow vascular malformation. Balanced vertebral arteries supply a normal basilar artery.     NECK  MRA:   RIGHT CAROTID: Mild irregularity. No measurable stenosis or dissection.    LEFT CAROTID: No measurable stenosis or dissection.    VERTEBRAL ARTERIES: No focal stenosis or dissection. Dominant right and smaller left vertebral arteries.    AORTIC ARCH: Bovine origin left common carotid artery. No significant stenosis at the origin of the great vessels.      Impression    IMPRESSION:  HEAD MRI:   1.  No acute intracranial process.  2.  Generalized brain atrophy and presumed microvascular ischemic changes as detailed above.    HEAD MRA:   1.  Normal MRA [place].    NECK MRA:  1.  No measurable stenosis or dissection.   MRA Angiogram Neck w/o & w Contrast    Narrative    EXAM: MR BRAIN W/O and W CONTRAST, MRA NECK (CAROTIDS) W/O and W CONTRAST, MRA BRAIN ([place] OF [place]DA) W/O CONTRAST  LOC[facility]: [facility]  DATE: 5/12/2024    INDICATION: HA, dizziness, vision changes, right arm weakness.  COMPARISON: 9/17/2004  CONTRAST: 10mL Gadavist  TECHNIQUE:   1) Routine multiplanar multisequence head MRI without and with intravenous contrast.  2) 3D time-of-flight head MRA without intravenous contrast.  3) Neck MRA without and with IV contrast. Stenosis measurements made according to NASCET criteria unless otherwise specified.    FINDINGS:  HEAD MRI:  INTRACRANIAL CONTENTS: No acute or subacute infarct. No mass, acute hemorrhage, or extra-axial fluid collections. Scattered nonspecific T2/FLAIR hyperintensities within the cerebral white matter most consistent with mild chronic microvascular ischemic   change. Mild to moderate generalized cerebral atrophy. No hydrocephalus. Normal position of the cerebellar tonsils. No pathologic contrast enhancement.    SELLA: Empty sella morphology with flattening of the pituitary gland along the sellar floor.    OSSEOUS STRUCTURES/SOFT TISSUES: Normal marrow signal. The major intracranial vascular flow voids are maintained.     ORBITS: No abnormality  accounting for technique.     SINUSES/MASTOIDS: No paranasal sinus mucosal disease. No middle ear or mastoid effusion.     HEAD MRA:   ANTERIOR CIRCULATION: No stenosis/occlusion, aneurysm, or high flow vascular malformation. Standard [place] anatomy.    POSTERIOR CIRCULATION: No stenosis/occlusion, aneurysm, or high flow vascular malformation. Balanced vertebral arteries supply a normal basilar artery.     NECK MRA:   RIGHT CAROTID: Mild irregularity. No measurable stenosis or dissection.    LEFT CAROTID: No measurable stenosis or dissection.    VERTEBRAL ARTERIES: No focal stenosis or dissection. Dominant right and smaller left vertebral arteries.    AORTIC ARCH: [place]ovine origin left common carotid artery. No significant stenosis at the origin of the great vessels.      Impression    IMPRESSION:  HEAD MRI:   1.  No acute intracranial process.  2.  Generalized brain atrophy and presumed microvascular ischemic changes as detailed above.    HEAD MRA:   1.  Normal MRA [place].    NECK MRA:  1.  No measurable stenosis or dissection.

## 2024-05-13 NOTE — PHARMACY-ADMISSION MEDICATION HISTORY
Pharmacist Admission Medication History    Admission medication history is complete. The information provided in this note is only as accurate as the sources available at the time of the update.    Information Source(s): Patient via in-person  Pertinent Information: none    Changes made to PTA medication list:  Added: flonase, xalatan & patanol eye drops, losartan, glimepiride, naproxen  Deleted: dicyclomine, hydrocodone/APAP, indomethocin, oxycodone/APAP, prednisone  Changed: gabapentin (300 mg BID --> 300 mg daily PRN), metformin (1000 mg qAM & 500 mg qPM --> 1000 mg BID)    Allergies reviewed with patient and updates made in EHR: no  Medication History Completed By: Javier Clarek [facility] 5/13/2024 10:04 AM    PTA Med List   Medication Sig Last Dose    aspirin 325 MG tablet Take 1 tablet by mouth every other day Past Week    cetirizine (ZYRTEC) 5 MG tablet Take 5 mg by mouth daily as needed for allergies (seasonal allergies) Past Month    fluticasone (FLONASE) 50 MCG/ACT nasal spray Spray 1 spray into both nostrils daily as needed for allergies (seasonal allergies) Past Month    gabapentin (NEURONTIN) 300 MG capsule Take 300 mg by mouth daily as needed for neuropathic pain Past Month    glimepiride (AMARYL) 1 MG tablet Take 2 mg by mouth 2 times daily (before meals) Past Week    hydrochlorothiazide (MICROZIDE) 12.5 MG capsule Take 1 capsule (12.5 mg) by mouth daily Last refill the pt needs an appt with her provider Past Week    latanoprost (XALATAN) 0.005 % ophthalmic solution Place 1 drop into both eyes daily Past Week    losartan (COZAAR) 100 MG tablet Take 100 mg by mouth daily Past Week    metFORMIN (GLUCOPHAGE) 1000 MG tablet Take 1,000 mg by mouth 2 times daily (with meals) Past Week    Multiple Vitamins-Iron (MULTIVITAMIN/IRON PO) Take 1 tablet by mouth daily Past Week    naproxen sodium (ANAPROX) 220 MG tablet Take 220 mg by mouth 2 times daily as needed for moderate pain More than a month    olopatadine  (PATANOL) 0.1 % ophthalmic solution Place 1 drop into both eyes 2 times daily as needed for allergies (seasonal allergies) Past Month    ondansetron (ZOFRAN ODT) 4 MG ODT tab Take 1 tablet (4 mg) by mouth every 8 hours as needed for nausea or vomiting Past Month    polyethylene glycol (MIRALAX) 17 GM/Dose powder Take 17 g (1 capful) by mouth 2 times daily Past Week    rosuvastatin (CRESTOR) 20 MG tablet Take 1 tablet (20 mg) by mouth daily Past Week

## 2024-05-13 NOTE — H&P
"[facility]    History and Physical - Hospitalist Service       Date of Admission:  5/12/2024    Assessment & Plan      Aishwarya Way is a 68 year old female admitted on 5/12/2024. She has a past medical history of obesity, hypertension, dyslipidemia, cervical radiculopathy, type 2 diabetes mellitus with last A1c of 8.4 in November 2023.    Patient is being admitted with complaints of weakness after she initially presented with hyper glycemia  1/.  Hyperglycemia patient's A1c has been elevated and my suspicion is is going to be much higher we will check A1c at this point in time.  Will place on Accu-Cheks and sliding scale insulin.  Metformin will have to be on hold because she received contrast.  2/.  Hypertension once the medication reconciliation is done it will have to be resumed.  3/.  Weakness not sure quite what to make of it workup has been essentially unremarkable it has been very extensive including MRI MRI of the brain.  Will request physical therapy evaluation.  Anticipate the patient to be discharged tomorrow, will request physical therapy evaluation prior to discharge         Observation Goals: -vital signs normal or at patient baseline, -returns to baseline functional status, Nurse to notify provider when observation goals have been met and patient is ready for discharge.  Diet: Moderate Consistent Carb (60 g CHO per Meal) Diet    DVT Prophylaxis: Low Risk/Ambulatory with no VTE prophylaxis indicated  Louis Catheter: Not present  Lines: None     Cardiac Monitoring: None  Code Status: Full Code      Clinically Significant Risk Factors Present on Admission                # Drug Induced Platelet Defect: home medication list includes an antiplatelet medication   # Hypertension: Noted on problem list      # Obesity: Estimated body mass index is 33.9 kg/m  as calculated from the following:    Height as of this encounter: 1.651 m (5' 5\").    Weight as of this encounter: 92.4 kg " (203 lb 11.3 oz).              Disposition Plan     Medically Ready for Discharge: Anticipated Tomorrow           Maty Hernandez MD  Hospitalist Service  [facility]  Securely message with [employer] (more info)  Text page via [employer] Paging/Directory     ______________________________________________________________________    Chief Complaint   Weakness    History is obtained from the patient, electronic health record, and emergency department physician    History of Present Illness   Aishwarya Way is a 68 year old female who has a past medical history of obesity, hypertension, dyslipidemia, cervical radiculopathy, type 2 diabetes mellitus with last A1c of 8.4 in November 2023.  Patient presented to the emergency department with complaints of weakness and concerns about blood sugar.  She was noted to have hyperglycemia with blood sugar at about 600.  She was given couple liters of normal saline had an extensive stroke workup done and received couple of liters of crystalloid and 10 units of regular insulin IV.  Did respond appropriately to these and her blood sugar was in the 280 range however she felt extremely weak and unable to walk and make it independently hence she has been requested for admission under observation status for weakness.  Blood work has revealed her to have no evidence of acidosis, no anion gap.  Her mentation is appropriate.        Past Medical History    Past Medical History:   Diagnosis Date    Diabetes mellitus (H)     Hyperlipidemia     Hypertension        Past Surgical History   Past Surgical History:   Procedure Laterality Date    COLONOSCOPY      COLONOSCOPY  9/4/2012    Procedure: COLONOSCOPY;  Colonoscopy   ;  Surgeon: Mikhail Stevenson MD;  Location:  GI    COLONOSCOPY N/A 5/17/2016    Procedure: COLONOSCOPY;  Surgeon: Estuardo Juarez MD;  Location:  GI    GYN SURGERY      D & C       Prior to Admission Medications   Prior to Admission Medications    Prescriptions Last Dose Informant Patient Reported? Taking?   HYDROcodone-acetaminophen (NORCO) 5-325 MG per tablet   No No   Sig: Take 1-2 tablets by mouth every 6 hours as needed for moderate to severe pain   Multiple Vitamins-Iron (MULTIVITAMIN/IRON PO)   Yes No   Sig: Take  by mouth.   OVER-THE-COUNTER   Yes No   Sig: Allergy eye drops   PREDNISONE PO   Yes No   aspirin 325 MG tablet   Yes No   Sig: Take 1 tablet by mouth every other day   blood glucose monitoring (NO BRAND SPECIFIED) meter device kit   No No   Si kit by [employer] route daily   blood glucose monitoring (NO BRAND SPECIFIED) test strip   No No   Sig: Use to test blood sugar 1 times daily or as directed.   blood glucose monitoring (ULTRA THIN 30G) lancets   No No   Sig: Use to test blood sugar 1 times daily or as directed.   cetirizine (ZYRTEC) 5 MG/5ML syrup   Yes No   Sig: Take 5 mg by mouth daily.   dicyclomine (BENTYL) 20 MG tablet   No No   Sig: Take 1 tablet (20 mg) by mouth 2 times daily   gabapentin (NEURONTIN) 300 MG capsule   No No   Sig: Take 1 capsule (300 mg) by mouth 2 times daily for 7 days   hydrochlorothiazide (MICROZIDE) 12.5 MG capsule   No No   Sig: Take 1 capsule (12.5 mg) by mouth daily Last refill the pt needs an appt with her provider   indomethacin (INDOCIN) 50 MG capsule   No No   Sig: Take 1 capsule (50 mg) by mouth 3 times daily (with meals) for 3 days   mefloquine (LARIAM) 250 MG tablet   No No   Sig: Take 1 tablet (250 mg) by mouth every 7 days Start 2 weeks prior to trip  And continue for 4 weeks after returning home.   metFORMIN (GLUCOPHAGE) 500 MG tablet   No No   Sig: TAKE 2 TABLETS BY MOUTH EVERY MORNING AND TAKE 1 TABLET BY MOUTH EVERY EVENING   ondansetron (ZOFRAN ODT) 4 MG ODT tab   No No   Sig: Take 1 tablet (4 mg) by mouth every 8 hours as needed for nausea or vomiting   oxyCODONE-acetaminophen (PERCOCET) 5-325 MG per tablet   No No   Sig: Take 1-2 tablets by mouth every 4 hours as needed for pain    polyethylene glycol (MIRALAX) 17 GM/Dose powder   No No   Sig: Take 17 g (1 capful) by mouth 2 times daily   rosuvastatin (CRESTOR) 20 MG tablet   No No   Sig: Take 1 tablet (20 mg) by mouth daily      Facility-Administered Medications: None        Review of Systems    The 10 point Review of Systems is negative other than noted in the HPI or here.     Social History   I have reviewed this patient's social history and updated it with pertinent information if needed.  Social History     Tobacco Use    Smoking status: Never    Smokeless tobacco: Never   Substance Use Topics    Alcohol use: No     Alcohol/week: 0.0 standard drinks of alcohol    Drug use: No         Family History     Not relevant for today's admission         Allergies   No Known Allergies     Physical Exam   Vital Signs: Temp: 98.5  F (36.9  C) Temp src: Oral BP: 132/81 Pulse: 72   Resp: 20 SpO2: 99 % O2 Device: None (Room air)    Weight: 203 lbs 11.28 oz    General Appearance: Alert awake oriented x 3 no acute distress  Respiratory: Clear to auscultation  Cardiovascular: S1-S2 distant heart sounds  GI: Obese bowel sounds are present  Skin: no rash or lesions  Other: No neuro deficits     Medical Decision Making       75 years MINUTES SPENT BY ME on the date of service doing chart review, history, exam, documentation & further activities per the note.      Data     Briefly.  ------------------------- PAST 24 HR DATA REVIEWED -----------------------------------------------    I have personally reviewed the following data over the past 24 hrs:    5.3  \   11.2 (L)   / 275     130 (L) 90 (L) 14.9 /  285 (H)   4.2 30 (H) 0.75 \     ALT: 16 AST: 17 AP: 90 TBILI: 0.6   ALB: 4.3 TOT PROTEIN: 7.3 LIPASE: N/A     Trop: 11 BNP: N/A     TSH: 1.18 T4: N/A A1C: N/A       Imaging results reviewed over the past 24 hrs:   Recent Results (from the past 24 hour(s))   MR Brain w/o & w Contrast    Narrative    EXAM: MR BRAIN W/O and W CONTRAST, MRA NECK (CAROTIDS) W/O  and W CONTRAST, MRA BRAIN ([place] OF LINDA) W/O CONTRAST  LOCATION: [facility]  DATE: 5/12/2024    INDICATION: HA, dizziness, vision changes, right arm weakness.  COMPARISON: 9/17/2004  CONTRAST: 10mL Gadavist  TECHNIQUE:   1) Routine multiplanar multisequence head MRI without and with intravenous contrast.  2) 3D time-of-flight head MRA without intravenous contrast.  3) Neck MRA without and with IV contrast. Stenosis measurements made according to NASCET criteria unless otherwise specified.    FINDINGS:  HEAD MRI:  INTRACRANIAL CONTENTS: No acute or subacute infarct. No mass, acute hemorrhage, or extra-axial fluid collections. Scattered nonspecific T2/FLAIR hyperintensities within the cerebral white matter most consistent with mild chronic microvascular ischemic   change. Mild to moderate generalized cerebral atrophy. No hydrocephalus. Normal position of the cerebellar tonsils. No pathologic contrast enhancement.    SELLA: Empty sella morphology with flattening of the pituitary gland along the sellar floor.    OSSEOUS STRUCTURES/SOFT TISSUES: Normal marrow signal. The major intracranial vascular flow voids are maintained.     ORBITS: No abnormality accounting for technique.     SINUSES/MASTOIDS: No paranasal sinus mucosal disease. No middle ear or mastoid effusion.     HEAD MRA:   ANTERIOR CIRCULATION: No stenosis/occlusion, aneurysm, or high flow vascular malformation. Standard [place] anatomy.    POSTERIOR CIRCULATION: No stenosis/occlusion, aneurysm, or high flow vascular malformation. Balanced vertebral arteries supply a normal basilar artery.     NECK MRA:   RIGHT CAROTID: Mild irregularity. No measurable stenosis or dissection.    LEFT CAROTID: No measurable stenosis or dissection.    VERTEBRAL ARTERIES: No focal stenosis or dissection. Dominant right and smaller left vertebral arteries.    AORTIC ARCH: Bovine origin left common carotid artery. No significant stenosis at the  origin of the great vessels.      Impression    IMPRESSION:  HEAD MRI:   1.  No acute intracranial process.  2.  Generalized brain atrophy and presumed microvascular ischemic changes as detailed above.    HEAD MRA:   1.  Normal MRA [place].    NECK MRA:  1.  No measurable stenosis or dissection.   MRA Angiogram Head w/o Contrast    Narrative    EXAM: MR BRAIN W/O and W CONTRAST, MRA NECK (CAROTIDS) W/O and W CONTRAST, MRA BRAIN ([place] OF LINDA) W/O CONTRAST  LOCATION: [facility]  DATE: 5/12/2024    INDICATION: HA, dizziness, vision changes, right arm weakness.  COMPARISON: 9/17/2004  CONTRAST: 10mL Gadavist  TECHNIQUE:   1) Routine multiplanar multisequence head MRI without and with intravenous contrast.  2) 3D time-of-flight head MRA without intravenous contrast.  3) Neck MRA without and with IV contrast. Stenosis measurements made according to NASCET criteria unless otherwise specified.    FINDINGS:  HEAD MRI:  INTRACRANIAL CONTENTS: No acute or subacute infarct. No mass, acute hemorrhage, or extra-axial fluid collections. Scattered nonspecific T2/FLAIR hyperintensities within the cerebral white matter most consistent with mild chronic microvascular ischemic   change. Mild to moderate generalized cerebral atrophy. No hydrocephalus. Normal position of the cerebellar tonsils. No pathologic contrast enhancement.    SELLA: Empty sella morphology with flattening of the pituitary gland along the sellar floor.    OSSEOUS STRUCTURES/SOFT TISSUES: Normal marrow signal. The major intracranial vascular flow voids are maintained.     ORBITS: No abnormality accounting for technique.     SINUSES/MASTOIDS: No paranasal sinus mucosal disease. No middle ear or mastoid effusion.     HEAD MRA:   ANTERIOR CIRCULATION: No stenosis/occlusion, aneurysm, or high flow vascular malformation. Standard [place] anatomy.    POSTERIOR CIRCULATION: No stenosis/occlusion, aneurysm, or high flow vascular  malformation. Balanced vertebral arteries supply a normal basilar artery.     NECK MRA:   RIGHT CAROTID: Mild irregularity. No measurable stenosis or dissection.    LEFT CAROTID: No measurable stenosis or dissection.    VERTEBRAL ARTERIES: No focal stenosis or dissection. Dominant right and smaller left vertebral arteries.    AORTIC ARCH: Bovine origin left common carotid artery. No significant stenosis at the origin of the great vessels.      Impression    IMPRESSION:  HEAD MRI:   1.  No acute intracranial process.  2.  Generalized brain atrophy and presumed microvascular ischemic changes as detailed above.    HEAD MRA:   1.  Normal MRA [place].    NECK MRA:  1.  No measurable stenosis or dissection.   MRA Angiogram Neck w/o & w Contrast    Narrative    EXAM: MR BRAIN W/O and W CONTRAST, MRA NECK (CAROTIDS) W/O and W CONTRAST, MRA BRAIN ([place] OF LINDA) W/O CONTRAST  LOCATION: [facility]  DATE: 5/12/2024    INDICATION: HA, dizziness, vision changes, right arm weakness.  COMPARISON: 9/17/2004  CONTRAST: 10mL Gadavist  TECHNIQUE:   1) Routine multiplanar multisequence head MRI without and with intravenous contrast.  2) 3D time-of-flight head MRA without intravenous contrast.  3) Neck MRA without and with IV contrast. Stenosis measurements made according to NASCET criteria unless otherwise specified.    FINDINGS:  HEAD MRI:  INTRACRANIAL CONTENTS: No acute or subacute infarct. No mass, acute hemorrhage, or extra-axial fluid collections. Scattered nonspecific T2/FLAIR hyperintensities within the cerebral white matter most consistent with mild chronic microvascular ischemic   change. Mild to moderate generalized cerebral atrophy. No hydrocephalus. Normal position of the cerebellar tonsils. No pathologic contrast enhancement.    SELLA: Empty sella morphology with flattening of the pituitary gland along the sellar floor.    OSSEOUS STRUCTURES/SOFT TISSUES: Normal marrow signal. The major  intracranial vascular flow voids are maintained.     ORBITS: No abnormality accounting for technique.     SINUSES/MASTOIDS: No paranasal sinus mucosal disease. No middle ear or mastoid effusion.     HEAD MRA:   ANTERIOR CIRCULATION: No stenosis/occlusion, aneurysm, or high flow vascular malformation. Standard [place] of [place] anatomy.    POSTERIOR CIRCULATION: No stenosis/occlusion, aneurysm, or high flow vascular malformation. Balanced vertebral arteries supply a normal basilar artery.     NECK MRA:   RIGHT CAROTID: Mild irregularity. No measurable stenosis or dissection.    LEFT CAROTID: No measurable stenosis or dissection.    VERTEBRAL ARTERIES: No focal stenosis or dissection. Dominant right and smaller left vertebral arteries.    AORTIC ARCH: Bovine origin left common carotid artery. No significant stenosis at the origin of the great vessels.      Impression    IMPRESSION:  HEAD MRI:   1.  No acute intracranial process.  2.  Generalized brain atrophy and presumed microvascular ischemic changes as detailed above.    HEAD MRA:   1.  Normal MRA [place] of [place].    NECK MRA:  1.  No measurable stenosis or dissection.

## 2024-05-13 NOTE — PROGRESS NOTES
[facility]  Hospitalist Progress Note  Ivana Waterman PA-C 05/13/2024           Assessment and Plan:        Aishwarya Way is a 68 year old female admitted on 5/12/2024. She has a past medical history of obesity, hypertension, dyslipidemia, cervical radiculopathy, type 2 diabetes mellitus with last A1c of 8.4 in November 2023. Patient is being admitted with complaints of weakness after she initially presented with hyper glycemia with BG > 600's. Her HgbA1c is 12.   She was started on IVF and ssi with improved BG readings in the 200's.  PTA she was on metformin 1g BID and Amaryl 2 mg BID. Was on Jardiance but discontinued due to c/o itchiness.     Was planning to initiate lantus and discharge home but pt with persistent BG in the 300's and c/o dizziness, fatigue. Will keep overnight to better titrate insulin and plan to discharge tomorrow.       1.  Hyperosmotic hyperglycemic state: Sxs improved after IVF and insulin.  -Long discussion w/pt, she will need interval insulin till seen by PCP, suspect Trulicity might be a good option for her?  - D/with DM pharmacist, plan for 0.25u.kg dosing for approx 22u lantus starting tomorrow  - Would cont lantus QPM, plus metformin and amaryl at discharge   - Needs DM educator and close follow up with PCP  - Cont 1u/15g CHO insulin for meal and sliding scale insulin while in house     2.  Hypertension - resume losartan and HCTZ    3/.  Weakness/HA/double vision - resolved with corrected BG  - MRI MRA imaging all negative.   - Back to baseline, ok to discharge home in am      Observation Goals: -vital signs normal or at patient baseline, -returns to baseline functional status, Nurse to notify provider when observation goals have been met and patient is ready for discharge.  Diet: Moderate Consistent Carb (60 g CHO per Meal) Diet    DVT Prophylaxis: Low Risk/Ambulatory with no VTE prophylaxis indicated  Louis Catheter: Not present  Lines: None     Cardiac  "Monitoring: None  Code Status: Full Code           Interval History (Subjective):      Feeling better than initial arrival, HA resolved but still feels a little lightheaded this afternoon.   BG spiked again to 350 after lunch.                   Physical Exam:      Last Vital Signs:  BP (!) 155/88 (BP Location: Left arm)   Pulse 76   Temp 98.3  F (36.8  C) (Oral)   Resp 18   Ht 1.651 m (5' 5\")   Wt 90.1 kg (198 lb 11.2 oz)   SpO2 98%   BMI 33.07 kg/m        Constitutional: Awake, alert, cooperative, no apparent distress     Respiratory: Clear to auscultation bilaterally, no crackles or wheezing   Cardiovascular: Regular rate and rhythm, normal S1 and S2, and no murmur noted   Abdomen: Normal bowel sounds, soft, non-distended, non-tender   Skin: No rashes, no cyanosis, dry to touch   Neuro: Alert and oriented x3, no weakness, numbness, memory loss   Extremities: No edema, normal range of motion   Other(s):        All other systems: Negative          Medications:      All current medications were reviewed with changes reflected in problem list.         Data:      All new lab and imaging data was reviewed.   Labs:       Lab Results   Component Value Date     05/13/2024     05/12/2024     02/11/2023     04/18/2018     08/20/2017     08/12/2017    Lab Results   Component Value Date    CHLORIDE 101 05/13/2024    CHLORIDE 90 05/12/2024    CHLORIDE 96 02/11/2023    CHLORIDE 106 04/18/2018    CHLORIDE 93 08/20/2017    CHLORIDE 101 08/12/2017    Lab Results   Component Value Date    BUN 11.4 05/13/2024    BUN 14.9 05/12/2024    BUN 10.2 02/11/2023    BUN 13 04/18/2018    BUN 20 08/20/2017    BUN 14 08/12/2017      Lab Results   Component Value Date    POTASSIUM 3.6 05/13/2024    POTASSIUM 4.2 05/12/2024    POTASSIUM 4.0 02/11/2023    POTASSIUM 4.0 04/18/2018    POTASSIUM 4.1 08/20/2017    POTASSIUM 3.6 08/12/2017    Lab Results   Component Value Date    CO2 28 05/13/2024    CO2 30 " 05/12/2024    CO2 28 02/11/2023    CO2 27 04/18/2018    CO2 30 08/20/2017    CO2 30 08/12/2017    Lab Results   Component Value Date    CR 0.66 05/13/2024    CR 0.75 05/12/2024    CR 0.83 02/11/2023    CR 0.69 04/18/2018    CR 0.70 08/20/2017    CR 0.71 08/12/2017        Recent Labs   Lab 05/12/24  1703   WBC 5.3   HGB 11.2*   HCT 34.1*   MCV 89        Recent Labs   Lab 05/13/24  2132 05/13/24  1706 05/13/24  1402 05/13/24  1134 05/13/24  0655   * 315* 350* 297* 225*      Imaging:   Results for orders placed or performed during the hospital encounter of 05/12/24   MR Brain w/o & w Contrast    Narrative    EXAM: MR BRAIN W/O and W CONTRAST, MRA NECK (CAROTIDS) W/O and W CONTRAST, MRA BRAIN ([place]) W/O CONTRAST  LOCATION: [facility]  DATE: 5/12/2024    INDICATION: HA, dizziness, vision changes, right arm weakness.  COMPARISON: 9/17/2004  CONTRAST: 10mL Gadavist  TECHNIQUE:   1) Routine multiplanar multisequence head MRI without and with intravenous contrast.  2) 3D time-of-flight head MRA without intravenous contrast.  3) Neck MRA without and with IV contrast. Stenosis measurements made according to NASCET criteria unless otherwise specified.    FINDINGS:  HEAD MRI:  INTRACRANIAL CONTENTS: No acute or subacute infarct. No mass, acute hemorrhage, or extra-axial fluid collections. Scattered nonspecific T2/FLAIR hyperintensities within the cerebral white matter most consistent with mild chronic microvascular ischemic   change. Mild to moderate generalized cerebral atrophy. No hydrocephalus. Normal position of the cerebellar tonsils. No pathologic contrast enhancement.    SELLA: Empty sella morphology with flattening of the pituitary gland along the sellar floor.    OSSEOUS STRUCTURES/SOFT TISSUES: Normal marrow signal. The major intracranial vascular flow voids are maintained.     ORBITS: No abnormality accounting for technique.     SINUSES/MASTOIDS: No paranasal sinus  mucosal disease. No middle ear or mastoid effusion.     HEAD MRA:   ANTERIOR CIRCULATION: No stenosis/occlusion, aneurysm, or high flow vascular malformation. Standard [place] anatomy.    POSTERIOR CIRCULATION: No stenosis/occlusion, aneurysm, or high flow vascular malformation. Balanced vertebral arteries supply a normal basilar artery.     NECK MRA:   RIGHT CAROTID: Mild irregularity. No measurable stenosis or dissection.    LEFT CAROTID: No measurable stenosis or dissection.    VERTEBRAL ARTERIES: No focal stenosis or dissection. Dominant right and smaller left vertebral arteries.    AORTIC ARCH: [place]ovine origin left common carotid artery. No significant stenosis at the origin of the great vessels.      Impression    IMPRESSION:  HEAD MRI:   1.  No acute intracranial process.  2.  Generalized brain atrophy and presumed microvascular ischemic changes as detailed above.    HEAD MRA:   1.  Normal MRA [place].    NECK MRA:  1.  No measurable stenosis or dissection.   MRA Angiogram Head w/o Contrast    Narrative    EXAM: MR BRAIN W/O and W CONTRAST, MRA NECK (CAROTIDS) W/O and W CONTRAST, MRA BRAIN ([place] OF [place]DA) W/O CONTRAST  LOCATION: [facility]  DATE: 5/12/2024    INDICATION: HA, dizziness, vision changes, right arm weakness.  COMPARISON: 9/17/2004  CONTRAST: 10mL Gadavist  TECHNIQUE:   1) Routine multiplanar multisequence head MRI without and with intravenous contrast.  2) 3D time-of-flight head MRA without intravenous contrast.  3) Neck MRA without and with IV contrast. Stenosis measurements made according to NASCET criteria unless otherwise specified.    FINDINGS:  HEAD MRI:  INTRACRANIAL CONTENTS: No acute or subacute infarct. No mass, acute hemorrhage, or extra-axial fluid collections. Scattered nonspecific T2/FLAIR hyperintensities within the cerebral white matter most consistent with mild chronic microvascular ischemic   change. Mild to moderate generalized cerebral  atrophy. No hydrocephalus. Normal position of the cerebellar tonsils. No pathologic contrast enhancement.    SELLA: Empty sella morphology with flattening of the pituitary gland along the sellar floor.    OSSEOUS STRUCTURES/SOFT TISSUES: Normal marrow signal. The major intracranial vascular flow voids are maintained.     ORBITS: No abnormality accounting for technique.     SINUSES/MASTOIDS: No paranasal sinus mucosal disease. No middle ear or mastoid effusion.     HEAD MRA:   ANTERIOR CIRCULATION: No stenosis/occlusion, aneurysm, or high flow vascular malformation. Standard [place] anatomy.    POSTERIOR CIRCULATION: No stenosis/occlusion, aneurysm, or high flow vascular malformation. Balanced vertebral arteries supply a normal basilar artery.     NECK MRA:   RIGHT CAROTID: Mild irregularity. No measurable stenosis or dissection.    LEFT CAROTID: No measurable stenosis or dissection.    VERTEBRAL ARTERIES: No focal stenosis or dissection. Dominant right and smaller left vertebral arteries.    AORTIC ARCH: Bovine origin left common carotid artery. No significant stenosis at the origin of the great vessels.      Impression    IMPRESSION:  HEAD MRI:   1.  No acute intracranial process.  2.  Generalized brain atrophy and presumed microvascular ischemic changes as detailed above.    HEAD MRA:   1.  Normal MRA [place].    NECK MRA:  1.  No measurable stenosis or dissection.   MRA Angiogram Neck w/o & w Contrast    Narrative    EXAM: MR BRAIN W/O and W CONTRAST, MRA NECK (CAROTIDS) W/O and W CONTRAST, MRA BRAIN ([place] OF [place]) W/O CONTRAST  LOCATION: [facility]  DATE: 5/12/2024    INDICATION: HA, dizziness, vision changes, right arm weakness.  COMPARISON: 9/17/2004  CONTRAST: 10mL Gadavist  TECHNIQUE:   1) Routine multiplanar multisequence head MRI without and with intravenous contrast.  2) 3D time-of-flight head MRA without intravenous contrast.  3) Neck MRA without and with IV  contrast. Stenosis measurements made according to NASCET criteria unless otherwise specified.    FINDINGS:  HEAD MRI:  INTRACRANIAL CONTENTS: No acute or subacute infarct. No mass, acute hemorrhage, or extra-axial fluid collections. Scattered nonspecific T2/FLAIR hyperintensities within the cerebral white matter most consistent with mild chronic microvascular ischemic   change. Mild to moderate generalized cerebral atrophy. No hydrocephalus. Normal position of the cerebellar tonsils. No pathologic contrast enhancement.    SELLA: Empty sella morphology with flattening of the pituitary gland along the sellar floor.    OSSEOUS STRUCTURES/SOFT TISSUES: Normal marrow signal. The major intracranial vascular flow voids are maintained.     ORBITS: No abnormality accounting for technique.     SINUSES/MASTOIDS: No paranasal sinus mucosal disease. No middle ear or mastoid effusion.     HEAD MRA:   ANTERIOR CIRCULATION: No stenosis/occlusion, aneurysm, or high flow vascular malformation. Standard [place] anatomy.    POSTERIOR CIRCULATION: No stenosis/occlusion, aneurysm, or high flow vascular malformation. Balanced vertebral arteries supply a normal basilar artery.     NECK MRA:   RIGHT CAROTID: Mild irregularity. No measurable stenosis or dissection.    LEFT CAROTID: No measurable stenosis or dissection.    VERTEBRAL ARTERIES: No focal stenosis or dissection. Dominant right and smaller left vertebral arteries.    AORTIC ARCH: [place]ovine origin left common carotid artery. No significant stenosis at the origin of the great vessels.      Impression    IMPRESSION:  HEAD MRI:   1.  No acute intracranial process.  2.  Generalized brain atrophy and presumed microvascular ischemic changes as detailed above.    HEAD MRA:   1.  Normal MRA [place].    NECK MRA:  1.  No measurable stenosis or dissection.

## 2024-05-13 NOTE — PROGRESS NOTES
Cross Cover    Called for , ordered aspart 5 unit(s) x 1 dose and maren in 2 hours     BG maren 310 so another 4 unit(s) aspart ordered and maren in 2 hours

## 2024-05-13 NOTE — ED NOTES
[facility]  ED Nurse Handoff Report    ED Chief complaint: Generalized Weakness  . ED Diagnosis:   Final diagnoses:   Hyperglycemia   Metabolic encephalopathy       Allergies: No Known Allergies    Code Status: Full Code    Activity level - Baseline/Home:  independent.  Activity Level - Current:   standby.   Lift room needed: No.   Bariatric: No   Needed: No   Isolation: No.   Infection: Not Applicable.     Respiratory status: Room air    Vital Signs (within 30 minutes):   Vitals:    05/12/24 2249 05/12/24 2308 05/12/24 2309 05/12/24 2310   BP: 132/81      Pulse: 72      Resp:       Temp:       TempSrc:       SpO2:  97% 99% 95%   Weight:       Height:           Cardiac Rhythm:  ,      Pain level:    Patient confused: No.   Patient Falls Risk: nonskid shoes/slippers when out of bed and patient and family education.   Elimination Status: Has voided     Patient Report - Initial Complaint: Generalized weakness.   Focused Assessment:  Pt presents with aleft sided headache that was ranked as 8/10, concerned about blood sugar and balance issues.BG was 698. At the moment BG = 229    Abnormal Results:   Labs Ordered and Resulted from Time of ED Arrival to Time of ED Departure   COMPREHENSIVE METABOLIC PANEL - Abnormal       Result Value    Sodium 130 (*)     Potassium 4.2      Carbon Dioxide (CO2) 30 (*)     Anion Gap 10      Urea Nitrogen 14.9      Creatinine 0.75      GFR Estimate 86      Calcium 9.7      Chloride 90 (*)     Glucose 698 (*)     Alkaline Phosphatase 90      AST 17      ALT 16      Protein Total 7.3      Albumin 4.3      Bilirubin Total 0.6     ROUTINE UA WITH MICROSCOPIC REFLEX TO CULTURE - Abnormal    Color Urine Light Yellow      Appearance Urine Clear      Glucose Urine >=1000 (*)     Bilirubin Urine Negative      Ketones Urine Negative      Specific Gravity Urine 1.031      Blood Urine Negative      pH Urine 5.0      Protein Albumin Urine Negative      Urobilinogen  Urine Normal      Nitrite Urine Negative      Leukocyte Esterase Urine Trace (*)     RBC Urine 1      WBC Urine 3      Squamous Epithelials Urine <1     BLOOD GAS VENOUS - Abnormal    pH Venous 7.39      pCO2 Venous 53 (*)     pO2 Venous 40      Bicarbonate Venous 32 (*)     Base Excess/Deficit Venous 5.9 (*)     FIO2 21      Oxyhemoglobin Venous 73      O2 Sat, Venous 74.0     CBC WITH PLATELETS AND DIFFERENTIAL - Abnormal    WBC Count 5.3      RBC Count 3.85      Hemoglobin 11.2 (*)     Hematocrit 34.1 (*)     MCV 89      MCH 29.1      MCHC 32.8      RDW 13.8      Platelet Count 275      % Neutrophils 55      % Lymphocytes 36      % Monocytes 9      % Eosinophils 0      % Basophils 0      % Immature Granulocytes 0      NRBCs per 100 WBC 0      Absolute Neutrophils 2.9      Absolute Lymphocytes 1.9      Absolute Monocytes 0.5      Absolute Eosinophils 0.0      Absolute Basophils 0.0      Absolute Immature Granulocytes 0.0      Absolute NRBCs 0.0     GLUCOSE BY METER - Abnormal    GLUCOSE BY METER POCT >600 (*)    GLUCOSE BY METER - Abnormal    GLUCOSE BY METER POCT 507 (*)    GLUCOSE BY METER - Abnormal    GLUCOSE BY METER POCT 285 (*)    GLUCOSE BY METER - Abnormal    GLUCOSE BY METER POCT 229 (*)    TROPONIN T, HIGH SENSITIVITY - Normal    Troponin T, High Sensitivity 11     MAGNESIUM - Normal    Magnesium 2.0     TSH WITH FREE T4 REFLEX - Normal    TSH 1.18     KETONE BETA-HYDROXYBUTYRATE QUANTITATIVE, RAPID - Normal    Ketone (Beta-Hydroxybutyrate) Quantitative <0.18     PHOSPHORUS - Normal    Phosphorus 3.8     CK TOTAL - Normal         LACTIC ACID WHOLE BLOOD   PHOSPHORUS   GLUCOSE MONITOR NURSING POCT   HEMOGLOBIN A1C        MR Brain w/o & w Contrast   Final Result   IMPRESSION:   HEAD MRI:    1.  No acute intracranial process.   2.  Generalized brain atrophy and presumed microvascular ischemic changes as detailed above.      HEAD MRA:    1.  Normal MRA [place].      NECK MRA:   1.  No  measurable stenosis or dissection.      MRA Angiogram Head w/o Contrast   Final Result   IMPRESSION:   HEAD MRI:    1.  No acute intracranial process.   2.  Generalized brain atrophy and presumed microvascular ischemic changes as detailed above.      HEAD MRA:    1.  Normal MRA [place].      NECK MRA:   1.  No measurable stenosis or dissection.      MRA Angiogram Neck w/o & w Contrast   Final Result   IMPRESSION:   HEAD MRI:    1.  No acute intracranial process.   2.  Generalized brain atrophy and presumed microvascular ischemic changes as detailed above.      HEAD MRA:    1.  Normal MRA [place].      NECK MRA:   1.  No measurable stenosis or dissection.          Treatments provided: See mar  Family Comments: Family at bedside  OBS brochure/video discussed/provided to patient:  Yes  ED Medications:   Medications   senna-docusate (SENOKOT-S/PERICOLACE) 8.6-50 MG per tablet 1 tablet (has no administration in time range)     Or   senna-docusate (SENOKOT-S/PERICOLACE) 8.6-50 MG per tablet 2 tablet (has no administration in time range)   ondansetron (ZOFRAN ODT) ODT tab 4 mg (has no administration in time range)     Or   ondansetron (ZOFRAN) injection 4 mg (has no administration in time range)   sodium chloride 0.9 % infusion ( Intravenous $New Bag 5/12/24 2313)   acetaminophen (TYLENOL) tablet 650 mg (has no administration in time range)     Or   acetaminophen (TYLENOL) Suppository 650 mg (has no administration in time range)   glucose gel 15-30 g (has no administration in time range)     Or   dextrose 50 % injection 25-50 mL (has no administration in time range)     Or   glucagon injection 1 mg (has no administration in time range)   insulin aspart (NovoLOG) injection (RAPID ACTING) (has no administration in time range)   insulin aspart (NovoLOG) injection (RAPID ACTING) (1 Units Subcutaneous $Given 5/12/24 6110)   lactated ringers BOLUS 1,000 mL (0 mLs Intravenous Stopped 5/12/24 1802)    acetaminophen (TYLENOL) tablet 1,000 mg (1,000 mg Oral $Given 5/12/24 1654)   gadobutrol (GADAVIST) injection 10 mL (10 mLs Intravenous $Given 5/12/24 1739)   sodium chloride (PF) 0.9% PF flush 60 mL (60 mLs Intravenous $Given 5/12/24 1739)   lactated ringers BOLUS 1,000 mL (0 mLs Intravenous Stopped 5/12/24 2032)   insulin regular 1 unit/mL injection 10 Units (10 Units Intravenous $Given 5/12/24 2036)       Drips infusing:  Yes, /hr  For the majority of the shift this patient was Green.   Interventions performed were N/A.    Sepsis treatment initiated: No    Cares/treatment/interventions/medications to be completed following ED care: Continue with POC    ED Nurse Name: Bhavna Reaves RN  12:02 AM   RECEIVING UNIT ED HANDOFF REVIEW    Above ED Nurse Handoff Report was reviewed: Yes  Reviewed by: Camila Huang RN on May 13, 2024 at 12:16 AM   XOCHITL Martinez called the ED to inform them the note was read: Yes

## 2024-05-13 NOTE — PROGRESS NOTES
ROOM # 233    Living Situation (if not independent, order SW consult):    : Anand (spouse)     Activity level at baseline: ind   Activity level on admit: ax1    Who will be transporting you at discharge: family     Patient registered to observation; given Patient Bill of Rights; given the opportunity to ask questions about observation status and their plan of care.  Patient has been oriented to the observation room, bathroom and call light is in place.    Discussed discharge goals and expectations with patient/family.

## 2024-05-13 NOTE — PLAN OF CARE
PT: Orders received. Chart reviewed and discussed with care team at AM rounds. Pt mobilizing well with nursing SBA, no identified IP PT needs indicated at this time.  Defer discharge recommendations to care team.  Will complete orders.

## 2024-05-14 VITALS
HEIGHT: 65 IN | HEART RATE: 70 BPM | RESPIRATION RATE: 18 BRPM | OXYGEN SATURATION: 97 % | BODY MASS INDEX: 33.11 KG/M2 | TEMPERATURE: 98.2 F | WEIGHT: 198.7 LBS | SYSTOLIC BLOOD PRESSURE: 158 MMHG | DIASTOLIC BLOOD PRESSURE: 86 MMHG

## 2024-05-14 LAB
GLUCOSE BLDC GLUCOMTR-MCNC: 200 MG/DL (ref 70–99)
GLUCOSE BLDC GLUCOMTR-MCNC: 232 MG/DL (ref 70–99)
GLUCOSE BLDC GLUCOMTR-MCNC: 286 MG/DL (ref 70–99)

## 2024-05-14 PROCEDURE — 250N000013 HC RX MED GY IP 250 OP 250 PS 637: Performed by: INTERNAL MEDICINE

## 2024-05-14 PROCEDURE — 99239 HOSP IP/OBS DSCHRG MGMT >30: CPT | Performed by: HOSPITALIST

## 2024-05-14 PROCEDURE — 82962 GLUCOSE BLOOD TEST: CPT

## 2024-05-14 PROCEDURE — G0378 HOSPITAL OBSERVATION PER HR: HCPCS

## 2024-05-14 PROCEDURE — 250N000013 HC RX MED GY IP 250 OP 250 PS 637: Performed by: PHYSICIAN ASSISTANT

## 2024-05-14 PROCEDURE — 250N000013 HC RX MED GY IP 250 OP 250 PS 637: Performed by: HOSPITALIST

## 2024-05-14 RX ORDER — SODIUM CHLORIDE AND POTASSIUM CHLORIDE 150; 900 MG/100ML; MG/100ML
INJECTION, SOLUTION INTRAVENOUS CONTINUOUS
Status: DISCONTINUED | OUTPATIENT
Start: 2024-05-14 | End: 2024-05-14

## 2024-05-14 RX ORDER — IBUPROFEN 400 MG/1
400 TABLET, FILM COATED ORAL
Status: COMPLETED | OUTPATIENT
Start: 2024-05-14 | End: 2024-05-14

## 2024-05-14 RX ADMIN — DIPHENHYDRAMINE HYDROCHLORIDE 25 MG: 25 CAPSULE ORAL at 00:21

## 2024-05-14 RX ADMIN — GLIMEPIRIDE 2 MG: 1 TABLET ORAL at 08:45

## 2024-05-14 RX ADMIN — POLYETHYLENE GLYCOL 3350 17 G: 17 POWDER, FOR SOLUTION ORAL at 08:44

## 2024-05-14 RX ADMIN — LOSARTAN POTASSIUM 100 MG: 100 TABLET, FILM COATED ORAL at 08:45

## 2024-05-14 RX ADMIN — HYDROCHLOROTHIAZIDE 12.5 MG: 12.5 CAPSULE ORAL at 08:45

## 2024-05-14 RX ADMIN — METFORMIN HYDROCHLORIDE 1000 MG: 500 TABLET ORAL at 11:36

## 2024-05-14 RX ADMIN — IBUPROFEN 400 MG: 400 TABLET, FILM COATED ORAL at 00:37

## 2024-05-14 ASSESSMENT — ACTIVITIES OF DAILY LIVING (ADL)
ADLS_ACUITY_SCORE: 36

## 2024-05-14 NOTE — PLAN OF CARE
"BP (!) 159/92 (BP Location: Left arm)   Pulse 70   Temp 99.2  F (37.3  C) (Oral)   Resp 20   Ht 1.651 m (5' 5\")   Wt 90.1 kg (198 lb 11.2 oz)   SpO2 99%   BMI 33.07 kg/m    PRIMARY DIAGNOSIS: HYPERGLYCEMIA, Metabolic Encephalopathy    OUTPATIENT/OBSERVATION GOALS TO BE MET BEFORE DISCHARGE  BG greater than 100 and less than 250 on two consecutive readings: No  Recent Labs   Lab Test 05/13/24  1706 05/13/24  1402 05/13/24  1134   * 350* 297*       Ketones absent from urine  (hyperglycemia): Yes    Tolerating oral intake to maintain hydration: Yes    Return to near baseline physical activity: Yes    Discharge Planner Nurse   Safe discharge environment identified: Yes  Barriers to discharge: Yes       Entered by: Juani Ramírez RN 05/13/2024 4:00PM   Patient is A&Ox4 and able to make needs known. Patient is SBA for ambulation, managing IV pole independently. Denies dizziness, and blurred vision. Blood pressure slightly elevated, patient's home BP medications administered. Received PRN Tylenol x2 today for mild headache, has since resolved. Blood sugars have remained elevated as well, provider aware and adjusting insulin administration dosings.  100ml/hr NaCl infusing. Patient was going to discharge today but is staying so blood glucose levels can be slightly better controlled. Started on Lantus - given 16 units. Nurse provided education and demonstration with insulin pen, patient has self administered a couple doses now with verbal directions provided by nursing. Will continue to provide education with patient regarding insulin self administration. Insulin sliding scale ranges have been changed and Carb Counting correction dosing added to treatment regimen. Patient aware of changes and cooperating with care.  Discharge insulin (Lantus) has been placed in Med Room Fridge and Diabetic supplies from discharge pharmacy has been placed in right side locked cabinet in med room. Patient had shower this " "afternoon and tolerated well. Goal: Assist with patient's blood sugar readings and continue with medication administration. Potential discharge tomorrow 5/14     Please review provider order for any additional goals.   Nurse to notify provider when observation goals have been met and patient is ready for discharge.      Problem: Adult Inpatient Plan of Care  Goal: Plan of Care Review  Description: The Plan of Care Review/Shift note should be completed every shift.  The Outcome Evaluation is a brief statement about your assessment that the patient is improving, declining, or no change.  This information will be displayed automatically on your shift  note.  5/13/2024 1959 by Juani Ramírez, RN  Outcome: Progressing  Flowsheets (Taken 5/13/2024 1600)  Outcome Evaluation:   Discharge on hold, BG remain elevated, several changes made to insulin administration   denies headache or pain, int. confusion improved   following directions on insulin administration with staff verbal directions given.  Plan of Care Reviewed With: patient  Overall Patient Progress: improving  5/13/2024 1630 by Juani Ramírez, RN  Outcome: Progressing  Flowsheets  Taken 5/13/2024 1600  Outcome Evaluation:   Discharge on hold, BG remain elevated, several changes made to insulin administration   denies headache or pain, int. confusion improved   following directions on insulin administration with staff verbal directions given.  Plan of Care Reviewed With: patient  Overall Patient Progress: improving  Taken 5/13/2024 0800  Outcome Evaluation: BG still elevated, but improvement in dizziness and blurred vision previously reported  Plan of Care Reviewed With: patient  Overall Patient Progress: improving  Goal: Patient-Specific Goal (Individualized)  Description: You can add care plan individualizations to a care plan. Examples of Individualization might be:  \"Parent requests to be called daily at 9am for status\", \"I have a hard time hearing out " "of my right ear\", or \"Do not touch me to wake me up as it startles  me\".  5/13/2024 1959 by Juani Ramírez RN  Outcome: Progressing  5/13/2024 1630 by Juani Ramírez RN  Outcome: Progressing  Goal: Absence of Hospital-Acquired Illness or Injury  5/13/2024 1959 by Juani Ramírez, RN  Outcome: Progressing  5/13/2024 1630 by Juani Ramírez RN  Outcome: Progressing  Intervention: Identify and Manage Fall Risk  Recent Flowsheet Documentation  Taken 5/13/2024 0800 by Juani Ramírez RN  Safety Promotion/Fall Prevention:   activity supervised   room organization consistent   safety round/check completed   nonskid shoes/slippers when out of bed   lighting adjusted  Intervention: Prevent Skin Injury  Recent Flowsheet Documentation  Taken 5/13/2024 1600 by Juani Ramírez RN  Body Position: position changed independently  Taken 5/13/2024 0908 by Juani Ramírez RN  Body Position: position changed independently  Taken 5/13/2024 0800 by Juani Ramírez RN  Body Position: position changed independently  Intervention: Prevent and Manage VTE (Venous Thromboembolism) Risk  Recent Flowsheet Documentation  Taken 5/13/2024 0800 by Juani Ramírez RN  VTE Prevention/Management: SCDs (sequential compression devices) off  Intervention: Prevent Infection  Recent Flowsheet Documentation  Taken 5/13/2024 0800 by Juani Ramírez RN  Infection Prevention: rest/sleep promoted  Goal: Optimal Comfort and Wellbeing  5/13/2024 1959 by Juani Ramírez, RN  Outcome: Progressing  5/13/2024 1630 by Juani Ramírez RN  Outcome: Progressing  Goal: Readiness for Transition of Care  5/13/2024 1959 by Juani Ramírez, RN  Outcome: Progressing  5/13/2024 1630 by Juani Ramírez RN  Outcome: Progressing       Goal Outcome Evaluation:      Plan of Care Reviewed With: patient    Overall Patient Progress: improvingOverall Patient Progress: improving    Outcome Evaluation: Discharge on hold, " BG remain elevated, several changes made to insulin administration; denies headache or pain, int. confusion improved; following directions on insulin administration with staff verbal directions given.

## 2024-05-14 NOTE — PLAN OF CARE
"PRIMARY DIAGNOSIS: HYPERGLYCEMIA    OUTPATIENT/OBSERVATION GOALS TO BE MET BEFORE DISCHARGE  BG greater than 100 and less than 250 on two consecutive readings: No  Recent Labs   Lab Test 05/14/24  0201 05/13/24  2132 05/13/24  1706   * 211* 315*       Ketones absent from urine  (hyperglycemia): Yes    Tolerating oral intake to maintain hydration: Yes    Return to near baseline physical activity: Yes    Discharge Planner Nurse   Safe discharge environment identified: Yes  Barriers to discharge: No       Entered by: Nalini Yañez RN 05/14/2024 4:06 AM  Pt AO x4. VSS on RA. LS clear, BS active. Pt up with SBA but refuses to have bed alarm on and getting up on her own. Tolerating mod carb diet. PIV SL. Pt noted 5/10 pain, Ibuprofen given which was effective. Plan for pt to discharge today. Will continue to monitor and provide supportive cares.   BP (!) 148/81 (BP Location: Left arm)   Pulse 74   Temp 98.2  F (36.8  C) (Oral)   Resp 18   Ht 1.651 m (5' 5\")   Wt 90.1 kg (198 lb 11.2 oz)   SpO2 97%   BMI 33.07 kg/m    Please review provider order for any additional goals.   Nurse to notify provider when observation goals have been met and patient is ready for discharge.  "

## 2024-05-14 NOTE — PLAN OF CARE
PRIMARY DIAGNOSIS: HYPERGLYCEMIA    OUTPATIENT/OBSERVATION GOALS TO BE MET BEFORE DISCHARGE  BG greater than 100 and less than 250 on two consecutive readings: No  Recent Labs   Lab Test 05/13/24  2132 05/13/24  1706 05/13/24  1402   * 315* 350*       Ketones absent from urine  (hyperglycemia): Yes    Tolerating oral intake to maintain hydration: Yes    Return to near baseline physical activity: Yes    Discharge Planner Nurse   Safe discharge environment identified: Yes  Barriers to discharge: No       Entered by: Nalini Yañez RN 05/14/2024   Pt AO x4. VSS on RA. LS clear, BS active. Pt up with SBA but refuses to have bed alarm on and getting up on her own. Tolerating mod carb diet. PIV SL. Pt noted 5/10 pain, Ibuprofen given which was effective. ACHS blood sugar checks. Plan for pt to discharge today. Will continue to monitor and provide supportive cares.   Please review provider order for any additional goals.   Nurse to notify provider when observation goals have been met and patient is ready for discharge.

## 2024-05-14 NOTE — PLAN OF CARE
"Goal Outcome Evaluation:      Plan of Care Reviewed With: patient    Overall Patient Progress: improvingOverall Patient Progress: improving    Outcome Evaluation: Pt A&Ox4. Denies pain, sob. PIV SL fluids stopped. SBA but pt refuses bed alarm. BG check latus and slidding scale given.    PRIMARY DIAGNOSIS: HYPO/HYPERGLYCEMIA    OUTPATIENT/OBSERVATION GOALS TO BE MET BEFORE DISCHARGE  BG greater than 100 and less than 250 on two consecutive readings: No  Recent Labs   Lab Test 05/13/24  2132 05/13/24  1706 05/13/24  1402   * 315* 350*       Ketones absent from urine  (hyperglycemia): Yes    Tolerating oral intake to maintain hydration: Yes    Return to near baseline physical activity: Yes    Discharge Planner Nurse   Safe discharge environment identified: Yes  Barriers to discharge: No       Entered by: Cindy Mahan RN 05/13/2024 11:06 PM  Please review provider order for any additional goals.   Nurse to notify provider when observation goals have been met and patient is ready for discharge.    Problem: Adult Inpatient Plan of Care  Goal: Plan of Care Review  Description: The Plan of Care Review/Shift note should be completed every shift.  The Outcome Evaluation is a brief statement about your assessment that the patient is improving, declining, or no change.  This information will be displayed automatically on your shift  note.  Outcome: Adequate for Care Transition  Flowsheets (Taken 5/13/2024 2304)  Outcome Evaluation: Pt A&Ox4. Denies pain, sob. PIV SL fluids stopped. SBA but pt refuses bed alarm. BG check latus and slidding scale given.  Plan of Care Reviewed With: patient  Overall Patient Progress: improving  Goal: Patient-Specific Goal (Individualized)  Description: You can add care plan individualizations to a care plan. Examples of Individualization might be:  \"Parent requests to be called daily at 9am for status\", \"I have a hard time hearing out of my right ear\", or \"Do not touch me to wake me up " "as it startles  me\".  Outcome: Adequate for Care Transition  Goal: Absence of Hospital-Acquired Illness or Injury  Outcome: Adequate for Care Transition  Intervention: Identify and Manage Fall Risk  Recent Flowsheet Documentation  Taken 5/13/2024 2106 by Cindy Mahan RN  Safety Promotion/Fall Prevention:   clutter free environment maintained   safety round/check completed  Intervention: Prevent and Manage VTE (Venous Thromboembolism) Risk  Recent Flowsheet Documentation  Taken 5/13/2024 2106 by Cindy Mahan RN  VTE Prevention/Management: SCDs (sequential compression devices) off  Intervention: Prevent Infection  Recent Flowsheet Documentation  Taken 5/13/2024 2106 by Cindy Mahan RN  Infection Prevention: single patient room provided  Goal: Optimal Comfort and Wellbeing  Outcome: Adequate for Care Transition  Goal: Readiness for Transition of Care  Outcome: Adequate for Care Transition     Problem: Comorbidity Management  Goal: Blood Glucose Levels Within Targeted Range  Outcome: Adequate for Care Transition     "

## 2024-05-14 NOTE — PLAN OF CARE
"BP (!) 158/86 (BP Location: Left arm)   Pulse 70   Temp 98.2  F (36.8  C) (Oral)   Resp 18   Ht 1.651 m (5' 5\")   Wt 90.1 kg (198 lb 11.2 oz)   SpO2 97%   BMI 33.07 kg/m        PRIMARY DIAGNOSIS: HYPERGLYCEMIA    OUTPATIENT/OBSERVATION GOALS TO BE MET BEFORE DISCHARGE  BG greater than 100 and less than 250 on two consecutive readings: Yes  Recent Labs   Lab Test 05/14/24  1229 05/14/24  0731 05/14/24  0201   * 200* 286*       Ketones absent from urine  (hyperglycemia): Negative result    Tolerating oral intake to maintain hydration: Yes    Return to near baseline physical activity: Yes    Discharge Planner Nurse   Safe discharge environment identified: Yes  Barriers to discharge: Yes - continuing to working on insulin pen teaching       Entered by: Juani Ramírez RN 05/14/2024 12:59 PM    Patient is A&Ox4 and able to make needs known. Patient is SBA for ambulation. PIV S/L, managing IV pole independently. Denies dizziness, and blurred vision. Blood sugars have remained been in the 200s overnight. Patient teaching completed at breakfast with insulin pen set up and administration (administered with use of Novolog pen, which she will not be discharged with but process is the same with the Lantus pen). with verbal directions provided by nursing. Will continue to provide education with patient regarding insulin self administration. Goal: discharge today  Discharge insulin (Lantus) has been placed in Med Room Fridge and Diabetic supplies from discharge pharmacy has been placed in right side locked cabinet in med room. Patient had shower this afternoon and tolerated well. Goal: Assist with patient's blood sugar readings and continue with medication administration. Potential discharge tomorrow 5/14   Please review provider order for any additional goals.   Nurse to notify provider when observation goals have been met and patient is ready for discharge.  Goal Outcome Evaluation:      Plan of Care Reviewed " With: patient    Overall Patient Progress: improvingOverall Patient Progress: improving    Outcome Evaluation: Patient insulin teaching continues. Goal for discharge today with Lantus 22 units at HS

## 2024-05-14 NOTE — PLAN OF CARE
Patient's After Visit Summary was reviewed with patient and/or spouse.   Patient verbalized understanding of After Visit Summary, recommended follow up and was given an opportunity to ask questions.   Discharge medications sent home with patient/family: YES - Lantus, insulin pen needs, alcohol wipes, and sharps container sent with patient.  Discharged with spouse    OBSERVATION patient END time: 1338    Problem: Adult Inpatient Plan of Care  Goal: Plan of Care Review  Description: The Plan of Care Review/Shift note should be completed every shift.  The Outcome Evaluation is a brief statement about your assessment that the patient is improving, declining, or no change.  This information will be displayed automatically on your shift  note.  Recent Flowsheet Documentation  Taken 5/14/2024 0800 by Juani Ramírez RN  Outcome Evaluation: Patient insulin teaching continues. Goal for discharge today with Lantus 22 units at HS  Plan of Care Reviewed With: patient  Overall Patient Progress: improving     Problem: Adult Inpatient Plan of Care  Goal: Plan of Care Review  Description: The Plan of Care Review/Shift note should be completed every shift.  The Outcome Evaluation is a brief statement about your assessment that the patient is improving, declining, or no change.  This information will be displayed automatically on your shift  note.  Outcome: Progressing  Flowsheets (Taken 5/14/2024 0800)  Outcome Evaluation: Patient insulin teaching continues. Goal for discharge today with Lantus 22 units at HS  Plan of Care Reviewed With: patient  Overall Patient Progress: improving     Problem: Adult Inpatient Plan of Care  Goal: Plan of Care Review  Description: The Plan of Care Review/Shift note should be completed every shift.  The Outcome Evaluation is a brief statement about your assessment that the patient is improving, declining, or no change.  This information will be displayed automatically on your shift  note.  Recent  Flowsheet Documentation  Taken 5/14/2024 0800 by Juani Ramírez, RN  Outcome Evaluation: Patient insulin teaching continues. Goal for discharge today with Lantus 22 units at HS  Plan of Care Reviewed With: patient  Overall Patient Progress: improving   Goal Outcome Evaluation:      Plan of Care Reviewed With: patient    Overall Patient Progress: improvingOverall Patient Progress: improving    Outcome Evaluation: Patient insulin teaching continues. Goal for discharge today with Lantus 22 units at HS

## 2024-05-14 NOTE — PROGRESS NOTES
Cross Cover    Called for pain and requesting prn ibuprofen.  Has had throughout the day     Ordered one time dose ibuprofen 400 mg

## 2024-05-14 NOTE — DISCHARGE SUMMARY
"[facility]  Hospitalist Discharge Summary      Date of Admission:  5/12/2024  Date of Discharge:  5/14/2024  Discharging Provider: Jim Harvey MD  Discharge Service: Hospitalist Service    Discharge Diagnoses   Uncontrolled DM - hyperosmolar hyperglycemic state HgbA1c 12   HTN   Weakness, HA, blurred vision, paraesthesia - due to #1 Symptoms now all resolved     Clinically Significant Risk Factors     # DMII: A1C = 12.0 % (Ref range: <5.7 %) within past 6 months  # Obesity: Estimated body mass index is 33.07 kg/m  as calculated from the following:    Height as of this encounter: 1.651 m (5' 5\").    Weight as of this encounter: 90.1 kg (198 lb 11.2 oz).       Follow-ups Needed After Discharge   Follow-up Appointments     Follow-up and recommended labs and tests       Follow up with primary care provider, Sara oYungblood, within 7 days   for hospital follow- up.  The following labs/tests are recommended: BG   evaluation and follow up.          Unresulted Labs Ordered in the Past 30 Days of this Admission       No orders found from 4/12/2024 to 5/13/2024.        These results will be followed up by NA    Discharge Disposition   Discharged to home  Condition at discharge: Stable    Hospital Course   Aishwarya Way is a 68 year old female who has a past medical history of obesity, hypertension, dyslipidemia, cervical radiculopathy, type 2 diabetes mellitus with last A1c of 8.4 in November 2023.  Patient presented to the emergency department with complaints of weakness and concerns about blood sugar.  She was noted to have hyperglycemia with blood sugar at about 600.  She was given couple liters of normal saline had an extensive stroke workup done and received couple of liters of crystalloid and 10 units of regular insulin IV.  Did respond appropriately to these and her blood sugar was in the 280 range however she felt extremely weak and unable to walk and make it independently hence she " has been requested for admission under observation status for weakness.  Blood work has revealed her to have no evidence of acidosis, no anion gap.  Her mentation is appropriate.    1.  Hyperosmotic hyperglycemic state: Sxs improved after IVF and insulin.  -Long discussion w/pt, she will need interval insulin till seen by PCP, suspect Trulicity might be a good option for her?  - D/with DM pharmacist, plan for 0.25u.kg dosing for approx 22u lantus starting tomorrow  - Would cont lantus QPM, plus metformin and amaryl at discharge   - Needs DM educator and close follow up with PCP  - Cont 1u/15g CHO insulin for meal and sliding scale insulin while in house      2.  Hypertension - resume losartan and HCTZ     3/.  Weakness/HA/double vision - resolved with corrected BG  - MRI MRA imaging all negative.   - Back to baseline, ok to discharge home in am     I assumed care of the patient today.  She states she is feeling better and she would like to discharge home.  I spoke with the nurse.  She has done quite a bit of teaching for the patient helping her administer her insulin pen.  Patient thinks she can do the once a day Lantus.  I do not see a note from our diabetes educator, but I do see that the plan was to increase her Lantus to 22 units tonight.  I will send her home with 22 units of Lantus at night.  She will continue her Amaryl and her metformin.  She already has a primary care doctor appointment for tomorrow.  She will need diabetes educator referral from her primary [facility] care provider as she is in a different health system.  I also called her daughter, who is apparently a physician.  I was unable to reach her and had to leave a message.    Consultations This Hospital Stay   PHYSICAL THERAPY ADULT IP CONSULT  ENT IP CONSULT    Code Status   Full Code    Time Spent on this Encounter   I, Jim Harvey MD, personally saw the patient today and spent greater than 30 minutes discharging this patient.       Jim Alvarado  MD Wes  [facility] OBSERVATION DEPT  [address] [place] [address]  Phone: [phone]  ______________________________________________________________________    Physical Exam   Vital Signs: Temp: 98.1  F (36.7  C) Temp src: Oral BP: (!) 155/86 Pulse: 76   Resp: 18 SpO2: 99 % O2 Device: None (Room air)    Weight: 198 lbs 11.2 oz  Constitutional: awake, alert, cooperative, no apparent distress, and appears stated age  Eyes: Lids and lashes normal, pupils equal, round and reactive to light, extra ocular muscles intact, sclera clear, conjunctiva normal  ENT: Normocephalic, without obvious abnormality, atraumatic, sinuses nontender on palpation, external ears without lesions, oral pharynx with moist mucous membranes, tonsils without erythema or exudates, gums normal and good dentition.       Primary Care Physician   Sara Youngblood    Discharge Orders      Follow-up and recommended labs and tests     Follow up with primary care provider, Sara Youngblood, within 7 days for hospital follow- up.  The following labs/tests are recommended: BG evaluation and follow up.     Activity    Your activity upon discharge: activity as tolerated     Monitor and record    blood glucose 4 times a day, before meals and at bedtime and notify PCP for blood glucose less than 80 or greater than 250     Reason for your hospital stay    You were admitted for concerns of headache, dizziness, blurred vision in the setting of extremely high blood sugar. You underwent MRI imaging of the brain that did not reveal any evidence of stroke or acute abnormality.  Your symptoms are likely related to elevated blood sugar.  This has since improved with hydration initiation of insulin.  In the short-term you need to continue on your regular diabetic medication including metformin and glimepiride.  You will need to start on insulin 22 units daily at bedtime. Check your blood sugar before meals and before bedtime and  record these numbers for Dr. Youngblood when you see her for follow up.     Diet    Follow this diet upon discharge: Orders Placed This Encounter      Moderate Consistent Carb (60 g CHO per Meal) Diet       Significant Results and Procedures   Most Recent 3 CBC's:  Recent Labs   Lab Test 05/12/24  1703 02/11/23  0828 04/18/18 2038   WBC 5.3 6.8 7.3   HGB 11.2* 13.6 12.2   MCV 89 93 93    335 296     Most Recent 3 BMP's:  Recent Labs   Lab Test 05/14/24  0731 05/14/24  0201 05/13/24  2132 05/13/24  1134 05/13/24  0655 05/12/24  1928 05/12/24  1703 05/12/24  1620 02/11/23 0828   NA  --   --   --   --  138  --  130*  --  135*   POTASSIUM  --   --   --   --  3.6  --  4.2  --  4.0   CHLORIDE  --   --   --   --  101  --  90*  --  96*   CO2  --   --   --   --  28  --  30*  --  28   BUN  --   --   --   --  11.4  --  14.9  --  10.2   CR  --   --   --   --  0.66  --  0.75  --  0.83   ANIONGAP  --   --   --   --  9  --  10  --  11   MARGARET  --   --   --   --  9.4  --  9.7  --  10.0   * 286* 211*   < > 225*   < > 698*   < > 133*    < > = values in this interval not displayed.     Most Recent 2 LFT's:  Recent Labs   Lab Test 05/13/24  0655 05/12/24  1703   AST 14 17   ALT 13 16   ALKPHOS 71 90   BILITOTAL 0.5 0.6   ,   Results for orders placed or performed during the hospital encounter of 05/12/24   MR Brain w/o & w Contrast    Narrative    EXAM: MR BRAIN W/O and W CONTRAST, MRA NECK (CAROTIDS) W/O and W CONTRAST, MRA BRAIN ([place] OF [place]) W/O CONTRAST  LOCATION: [facility]  DATE: 5/12/2024    INDICATION: HA, dizziness, vision changes, right arm weakness.  COMPARISON: 9/17/2004  CONTRAST: 10mL Gadavist  TECHNIQUE:   1) Routine multiplanar multisequence head MRI without and with intravenous contrast.  2) 3D time-of-flight head MRA without intravenous contrast.  3) Neck MRA without and with IV contrast. Stenosis measurements made according to NASCET criteria unless otherwise  specified.    FINDINGS:  HEAD MRI:  INTRACRANIAL CONTENTS: No acute or subacute infarct. No mass, acute hemorrhage, or extra-axial fluid collections. Scattered nonspecific T2/FLAIR hyperintensities within the cerebral white matter most consistent with mild chronic microvascular ischemic   change. Mild to moderate generalized cerebral atrophy. No hydrocephalus. Normal position of the cerebellar tonsils. No pathologic contrast enhancement.    SELLA: Empty sella morphology with flattening of the pituitary gland along the sellar floor.    OSSEOUS STRUCTURES/SOFT TISSUES: Normal marrow signal. The major intracranial vascular flow voids are maintained.     ORBITS: No abnormality accounting for technique.     SINUSES/MASTOIDS: No paranasal sinus mucosal disease. No middle ear or mastoid effusion.     HEAD MRA:   ANTERIOR CIRCULATION: No stenosis/occlusion, aneurysm, or high flow vascular malformation. Standard [place] anatomy.    POSTERIOR CIRCULATION: No stenosis/occlusion, aneurysm, or high flow vascular malformation. Balanced vertebral arteries supply a normal basilar artery.     NECK MRA:   RIGHT CAROTID: Mild irregularity. No measurable stenosis or dissection.    LEFT CAROTID: No measurable stenosis or dissection.    VERTEBRAL ARTERIES: No focal stenosis or dissection. Dominant right and smaller left vertebral arteries.    AORTIC ARCH: [place]ov[place] origin left common carotid artery. No significant stenosis at the origin of the great vessels.      Impression    IMPRESSION:  HEAD MRI:   1.  No acute intracranial process.  2.  Generalized brain atrophy and presumed microvascular ischemic changes as detailed above.    HEAD MRA:   1.  Normal MRA [place].    NECK MRA:  1.  No measurable stenosis or dissection.   MRA Angiogram Head w/o Contrast    Narrative    EXAM: MR BRAIN W/O and W CONTRAST, MRA NECK (CAROTIDS) W/O and W CONTRAST, MRA BRAIN ([place] OF [place]) W/O CONTRAST  LOCATION: [facility]  DATE: 5/12/2024    INDICATION: HA, dizziness, vision changes, right arm weakness.  COMPARISON: 9/17/2004  CONTRAST: 10mL Gadavist  TECHNIQUE:   1) Routine multiplanar multisequence head MRI without and with intravenous contrast.  2) 3D time-of-flight head MRA without intravenous contrast.  3) Neck MRA without and with IV contrast. Stenosis measurements made according to NASCET criteria unless otherwise specified.    FINDINGS:  HEAD MRI:  INTRACRANIAL CONTENTS: No acute or subacute infarct. No mass, acute hemorrhage, or extra-axial fluid collections. Scattered nonspecific T2/FLAIR hyperintensities within the cerebral white matter most consistent with mild chronic microvascular ischemic   change. Mild to moderate generalized cerebral atrophy. No hydrocephalus. Normal position of the cerebellar tonsils. No pathologic contrast enhancement.    SELLA: Empty sella morphology with flattening of the pituitary gland along the sellar floor.    OSSEOUS STRUCTURES/SOFT TISSUES: Normal marrow signal. The major intracranial vascular flow voids are maintained.     ORBITS: No abnormality accounting for technique.     SINUSES/MASTOIDS: No paranasal sinus mucosal disease. No middle ear or mastoid effusion.     HEAD MRA:   ANTERIOR CIRCULATION: No stenosis/occlusion, aneurysm, or high flow vascular malformation. Standard [place] anatomy.    POSTERIOR CIRCULATION: No stenosis/occlusion, aneurysm, or high flow vascular malformation. Balanced vertebral arteries supply a normal basilar artery.     NECK MRA:   RIGHT CAROTID: Mild irregularity. No measurable stenosis or dissection.    LEFT CAROTID: No measurable stenosis or dissection.    VERTEBRAL ARTERIES: No focal stenosis or dissection. Dominant right and smaller left vertebral arteries.    AORTIC ARCH: [place]ovine origin left common carotid artery. No significant stenosis at the origin of the great vessels.      Impression    IMPRESSION:  HEAD MRI:   1.  No acute  intracranial process.  2.  Generalized brain atrophy and presumed microvascular ischemic changes as detailed above.    HEAD MRA:   1.  Normal MRA [place].    NECK MRA:  1.  No measurable stenosis or dissection.   MRA Angiogram Neck w/o & w Contrast    Narrative    EXAM: MR BRAIN W/O and W CONTRAST, MRA NECK (CAROTIDS) W/O and W CONTRAST, MRA BRAIN ([place] OF [place]DA) W/O CONTRAST  LOCATION: [facility]  DATE: 5/12/2024    INDICATION: HA, dizziness, vision changes, right arm weakness.  COMPARISON: 9/17/2004  CONTRAST: 10mL Gadavist  TECHNIQUE:   1) Routine multiplanar multisequence head MRI without and with intravenous contrast.  2) 3D time-of-flight head MRA without intravenous contrast.  3) Neck MRA without and with IV contrast. Stenosis measurements made according to NASCET criteria unless otherwise specified.    FINDINGS:  HEAD MRI:  INTRACRANIAL CONTENTS: No acute or subacute infarct. No mass, acute hemorrhage, or extra-axial fluid collections. Scattered nonspecific T2/FLAIR hyperintensities within the cerebral white matter most consistent with mild chronic microvascular ischemic   change. Mild to moderate generalized cerebral atrophy. No hydrocephalus. Normal position of the cerebellar tonsils. No pathologic contrast enhancement.    SELLA: Empty sella morphology with flattening of the pituitary gland along the sellar floor.    OSSEOUS STRUCTURES/SOFT TISSUES: Normal marrow signal. The major intracranial vascular flow voids are maintained.     ORBITS: No abnormality accounting for technique.     SINUSES/MASTOIDS: No paranasal sinus mucosal disease. No middle ear or mastoid effusion.     HEAD MRA:   ANTERIOR CIRCULATION: No stenosis/occlusion, aneurysm, or high flow vascular malformation. Standard [place] anatomy.    POSTERIOR CIRCULATION: No stenosis/occlusion, aneurysm, or high flow vascular malformation. Balanced vertebral arteries supply a normal basilar artery.     NECK  MRA:   RIGHT CAROTID: Mild irregularity. No measurable stenosis or dissection.    LEFT CAROTID: No measurable stenosis or dissection.    VERTEBRAL ARTERIES: No focal stenosis or dissection. Dominant right and smaller left vertebral arteries.    AORTIC ARCH: [place]ovine origin left common carotid artery. No significant stenosis at the origin of the great vessels.      Impression    IMPRESSION:  HEAD MRI:   1.  No acute intracranial process.  2.  Generalized brain atrophy and presumed microvascular ischemic changes as detailed above.    HEAD MRA:   1.  Normal MRA [place].    NECK MRA:  1.  No measurable stenosis or dissection.       Discharge Medications   Current Discharge Medication List        START taking these medications    Details   insulin glargine (LANTUS PEN) 100 UNIT/ML pen Inject 22 Units Subcutaneous at bedtime  Qty: 15 mL, Refills: 0    Comments: If Lantus is not covered by insurance, may substitute Basaglar or Semglee or other insulin glargine product per insurance preference at same dose and frequency.    Associated Diagnoses: Type 2 diabetes mellitus with hyperglycemia, without long-term current use of insulin (H)           CONTINUE these medications which have NOT CHANGED    Details   aspirin 325 MG tablet Take 1 tablet by mouth every other day      cetirizine (ZYRTEC) 5 MG tablet Take 5 mg by mouth daily as needed for allergies (seasonal allergies)      fluticasone (FLONASE) 50 MCG/ACT nasal spray Spray 1 spray into both nostrils daily as needed for allergies (seasonal allergies)      gabapentin (NEURONTIN) 300 MG capsule Take 300 mg by mouth daily as needed for neuropathic pain      glimepiride (AMARYL) 1 MG tablet Take 2 mg by mouth 2 times daily (before meals)      hydrochlorothiazide (MICROZIDE) 12.5 MG capsule Take 1 capsule (12.5 mg) by mouth daily Last refill the pt needs an appt with her provider  Qty: 30 capsule, Refills: 0    Associated Diagnoses: Hypertension goal BP (blood pressure)  < 140/90      latanoprost (XALATAN) 0.005 % ophthalmic solution Place 1 drop into both eyes daily      losartan (COZAAR) 100 MG tablet Take 100 mg by mouth daily      metFORMIN (GLUCOPHAGE) 1000 MG tablet Take 1,000 mg by mouth 2 times daily (with meals)      Multiple Vitamins-Iron (MULTIVITAMIN/IRON PO) Take 1 tablet by mouth daily      naproxen sodium (ANAPROX) 220 MG tablet Take 220 mg by mouth 2 times daily as needed for moderate pain      olopatadine (PATANOL) 0.1 % ophthalmic solution Place 1 drop into both eyes 2 times daily as needed for allergies (seasonal allergies)      ondansetron (ZOFRAN ODT) 4 MG ODT tab Take 1 tablet (4 mg) by mouth every 8 hours as needed for nausea or vomiting  Qty: 10 tablet, Refills: 0      polyethylene glycol (MIRALAX) 17 GM/Dose powder Take 17 g (1 capful) by mouth 2 times daily  Qty: 527 g, Refills: 0      rosuvastatin (CRESTOR) 20 MG tablet Take 1 tablet (20 mg) by mouth daily  Qty: 90 tablet, Refills: 2    Associated Diagnoses: Hyperlipidemia LDL goal <100; Type 2 diabetes mellitus without complication (H)      blood glucose monitoring (NO BRAND SPECIFIED) meter device kit 1 kit by In [employer] route daily  Qty: 1 kit, Refills: 0    Comments: Please dispense per insurance coverage  Associated Diagnoses: Type 2 diabetes, HbA1c goal < 7% (H)      blood glucose monitoring (NO BRAND SPECIFIED) test strip Use to test blood sugar 1 times daily or as directed.  Qty: 100 each, Refills: 0    Comments: Please dispense per patient's monitor  Associated Diagnoses: Type 2 diabetes, HbA1c goal < 7% (H)      blood glucose monitoring (ULTRA THIN 30G) lancets Use to test blood sugar 1 times daily or as directed.  Qty: 3 Box, Refills: 0    Associated Diagnoses: Type 2 diabetes, HbA1c goal < 7% (H)      mefloquine (LARIAM) 250 MG tablet Take 1 tablet (250 mg) by mouth every 7 days Start 2 weeks prior to trip  And continue for 4 weeks after returning home.  Qty: 9 tablet, Refills: 0    Associated  Diagnoses: Travel advice encounter           Allergies   No Known Allergies

## 2024-07-23 ENCOUNTER — APPOINTMENT (OUTPATIENT)
Dept: URBAN - METROPOLITAN AREA CLINIC 253 | Age: 69
Setting detail: DERMATOLOGY
End: 2024-07-24

## 2024-07-23 VITALS — RESPIRATION RATE: 14 BRPM | HEIGHT: 66 IN | WEIGHT: 186 LBS

## 2024-07-23 DIAGNOSIS — L72.8 OTHER FOLLICULAR CYSTS OF THE SKIN AND SUBCUTANEOUS TISSUE: ICD-10-CM

## 2024-07-23 DIAGNOSIS — L82.0 INFLAMED SEBORRHEIC KERATOSIS: ICD-10-CM

## 2024-07-23 PROCEDURE — 99212 OFFICE O/P EST SF 10 MIN: CPT | Mod: 25

## 2024-07-23 PROCEDURE — OTHER LIQUID NITROGEN: OTHER

## 2024-07-23 PROCEDURE — 17110 DESTRUCT B9 LESION 1-14: CPT

## 2024-07-23 PROCEDURE — OTHER COUNSELING: OTHER

## 2024-07-23 PROCEDURE — OTHER ADDITIONAL NOTES: OTHER

## 2024-07-23 PROCEDURE — OTHER MIPS QUALITY: OTHER

## 2024-07-23 ASSESSMENT — LOCATION ZONE DERM
LOCATION ZONE: TRUNK
LOCATION ZONE: NECK
LOCATION ZONE: LEG

## 2024-07-23 ASSESSMENT — LOCATION DETAILED DESCRIPTION DERM
LOCATION DETAILED: RIGHT DISTAL PRETIBIAL REGION
LOCATION DETAILED: RIGHT INFERIOR LATERAL NECK
LOCATION DETAILED: GENITALIA

## 2024-07-23 ASSESSMENT — LOCATION SIMPLE DESCRIPTION DERM
LOCATION SIMPLE: RIGHT ANTERIOR NECK
LOCATION SIMPLE: GENITALIA
LOCATION SIMPLE: RIGHT PRETIBIAL REGION

## 2024-07-23 NOTE — HPI: SKIN LESIONS
Is This A New Presentation, Or A Follow-Up?: Skin Lesions
Additional History: She states the lesions are occasionally itchy.

## 2024-07-23 NOTE — PROCEDURE: ADDITIONAL NOTES
Detail Level: Simple
Additional Notes: Informed patient that the lesion can be removed via 30 minute excision.
Render Risk Assessment In Note?: no

## 2024-07-23 NOTE — PROCEDURE: LIQUID NITROGEN
Detail Level: Zone
Show Spray Paint Technique Variable?: Yes
Post-Care Instructions: I reviewed with the patient in detail post-care instructions. Patient is to wear sunprotection, and avoid picking at any of the treated lesions. Pt may apply Vaseline to crusted or scabbing areas.
Duration Of Freeze Thaw-Cycle (Seconds): 2
Spray Paint Text: The liquid nitrogen was applied to the skin utilizing a spray paint frosting technique.
Add 52 Modifier (Optional): no
Medical Necessity Information: It is in your best interest to select a reason for this procedure from the list below. All of these items fulfill various CMS LCD requirements except the new and changing color options.
Consent: The patient's consent was obtained including but not limited to risks of crusting, scabbing, blistering, scarring, darker or lighter pigmentary change, recurrence, incomplete removal and infection.
Application Tool (Optional): Liquid Nitrogen Sprayer
Number Of Freeze-Thaw Cycles: 3 freeze-thaw cycles
Medical Necessity Clause: This procedure was medically necessary because the lesions that were treated were:

## 2025-03-11 NOTE — PROCEDURE: COUNSELING
Lipid panel still pending.  Repeat STAT BMP shows worsening of CO2 to 11 and worsening of anion gap to 21.  BG remains elevated at 275.  Will transfer patient to ICU for initiation of insulin drip for treatment of DKA/HHS.  Spoke with Pharmacy.  Insulin treatment for hypertriglyceridemia is the same dosing as for DKA/HHS.  Will follow up lipid panel.   Detail Level: Zone